# Patient Record
Sex: MALE | Race: WHITE | NOT HISPANIC OR LATINO
[De-identification: names, ages, dates, MRNs, and addresses within clinical notes are randomized per-mention and may not be internally consistent; named-entity substitution may affect disease eponyms.]

---

## 2019-02-13 ENCOUNTER — APPOINTMENT (OUTPATIENT)
Dept: ORTHOPEDIC SURGERY | Facility: CLINIC | Age: 84
End: 2019-02-13
Payer: MEDICARE

## 2019-02-13 VITALS
DIASTOLIC BLOOD PRESSURE: 86 MMHG | HEIGHT: 72 IN | BODY MASS INDEX: 26.41 KG/M2 | SYSTOLIC BLOOD PRESSURE: 132 MMHG | HEART RATE: 66 BPM | WEIGHT: 195 LBS

## 2019-02-13 DIAGNOSIS — Z47.1 AFTERCARE FOLLOWING JOINT REPLACEMENT SURGERY: ICD-10-CM

## 2019-02-13 DIAGNOSIS — Z96.642 PRESENCE OF LEFT ARTIFICIAL HIP JOINT: ICD-10-CM

## 2019-02-13 PROCEDURE — 73502 X-RAY EXAM HIP UNI 2-3 VIEWS: CPT | Mod: LT

## 2019-02-13 PROCEDURE — 99213 OFFICE O/P EST LOW 20 MIN: CPT

## 2019-02-13 NOTE — ADDENDUM
[FreeTextEntry1] : I, Andre Christiansen, acted solely as a scribe for Dr. Arik Rees on this date 02/13/2019. \par I, Moris Dangelo, acted solely as a scribe for Dr. Arik Rees on this date 02/13/2019 .

## 2019-02-13 NOTE — HISTORY OF PRESENT ILLNESS
[Stable] : stable [Direct Pressure] : worsened by direct pressure [Recumbency] : relieved by recumbency [Rest] : relieved by rest [Walking] : walking [Sitting] : sitting [Standing] : standing [Intermit.] : ~He/She~ states the symptoms seem to be intermittent [de-identified] : 83 year old male presents s/p left THR DOS 08/18/16 and bilateral TKA DOS 12/04/14. He is progressing well and his pain is well controlled. He is active and plays tennis about 3 days per week. He denies groin pain but notes lateral hip pain that has lasted for the last 6 months.

## 2019-02-13 NOTE — DISCUSSION/SUMMARY
[de-identified] : 83 year old male presents s/p left THR DOS 08/18/16 and bilateral TKA DOS 12/04/14, with left hip bursitis. Xrays were reviewed and the patient was reassured that their THR components are in good position with no signs of loosening or wear. He is progressing well and is happy with his surgical result. I offered the patient a cortisone injection in the left greater trochanteric bursa but he defers at this time. Patient will FU annually for radiographic surveillance. \par \par Total hip arthroplasty: A hip replacement means a resurfacing of both surfaces of the hip, with metal, ceramic and/or plastic parts. The prosthetic parts are usually press fit into bone, and only rarely cemented into position. Patients are allowed to weight bear as tolerated in most cases. The postoperative motion is determined by multiple factors the most important of which is preoperative motion. In general, the better the motion pre operatively, the better the motion post operatively. The operation, pending medical clearance, generally requires a hospitalization of 3-4 days. In general, we prefer to perform the procedure under epidural or general anesthesia. Preoperatively we institute a nomogram for blood management which may include the administration of procrit. The operative procedure takes approximately 1-2 hours. The operation requires an oblique incision anywhere from 4-6 inches over the posterolateral hip region. Post operatively the patient is walking the day of or the day after surgery. The first couple of days are very painful and the pain medication will alleviate but not eliminate the pain. Thus the patient must really push hard to get their mobility back. Our goal for having the person go home is that they can walk with a walker or a cane. The walking aide is to be dispensed with once the patient is secure enough. In general, there is no cast or braces required with a routine hip replacement. In the long term, we do not encourage our patients to run for the sake of running; although, pending their pre operative status, we often allow patients to play doubles tennis or comparable activities. We also allow them to do gentle intermediate downhill skiing if they are truly an expert skier. Biking is encouraged as well as swimming. The follow up periods are usually at 3 weeks, 6 weeks, 3 months, and yearly intervals. Potential complications with total hip replacements include anaesthetic complications and death, infection (less than 1%), nerve damage, and in the case of a sciatic nerve injury, a permanent foot drop, (a flapping foot with ambulation that requires bracing). There are always areas of skin numbness but this is not an untoward effect nor do we consider it a complication. Other potential complications include dislocation of the hip component (less than 5%). In cases of recurrent dislocation revision surgery may be required. The loosening of either the acetabular or femoral component is much more infrequent. The components may wear, creating particulate debris, loosening and systemic complications. Specific concerns exist with all bearing surfaces such as metal sensitivity with metal on metal components, and the risk of fracture and squeaking with ceramic components. Major blood vessel damage is also extremely rare. Theoretically because of the anatomic proximity of the femoral artery, it could be lacerated with subsequent repairs required. Albeit unlikely, a disruption of the femoral artery could theoretically result in an amputation. Similarly, infection could theoretically result in an amputation if one were to grow out an organism that cannot be controlled with antibiotics. Most infections require removal of the prosthesis, placement of an antibiotic spacer, IV antibiotics for eradication, prior to reimplantation. General medical complications include phlebitis for which we prophylactically anticoagulate but it could still occur and fatal pulmonary embolus has been reported. Cardiovascular problems, such as a heart attack or ischemia are always a concern with such hemodynamic changes in the blood vascular system. There is a risk of leg length discrepancy and the need for a shoe lift. Other general complications are very rare but anything in medicine could theoretically happen. I think the patient understands the risk benefit ratio of total hip replacement and will think about whether they would like to pursue an operative or non-operative option.

## 2019-02-13 NOTE — PHYSICAL EXAM
[LE] : Sensory: Intact in bilateral lower extremities [ALL] : dorsalis pedis, posterior tibial, femoral, popliteal, and radial 2+ and symmetric bilaterally [Antalgic] : not antalgic [Poor Appearance] : well-appearing [Acute Distress] : not in acute distress [Obese] : not obese [de-identified] : GENERAL APPEARANCE: Well nourished and hydrated, pleasant, alert, and oriented x 3. Appears their stated age. \par HEENT: Normocephalic, extraocular eye motion intact. Nasal septum midline. Oral cavity clear. External auditory canal clear. \par RESPIRATORY: Breath sounds clear and audible in all lobes. No wheezing, No accessory muscle use.\par CARDIOVASCULAR: No apparent abnormalities. No lower leg edema. No varicosities. Pedal pulses are palpable.\par NEUROLOGIC: Sensation is normal, no muscle weakness in the upper or lower extremities.\par DERMATOLOGIC: No apparent skin lesions, moist, warm, no rash.\par SPINE: Cervical spine appears normal and moves freely; thoracic spine appears normal and moves freely; lumbosacral spine appears normal and moves freely, normal, nontender.\par MUSCULOSKELETAL: Hands, wrists, and elbows are normal and move freely, shoulders are normal and move freely. \par \par \par   [de-identified] : Exam of left hip shows slight tenderness over the greater trochanteric bursa. FROM, no pain with motion. [de-identified] : 3V xray of the pelvis and left hip done in office today and reviewed by Dr. Arik Rees demonstrates s/p left THR with implants in good positioning, no sign of wear, loosening or subsidence.

## 2021-10-30 ENCOUNTER — TRANSCRIPTION ENCOUNTER (OUTPATIENT)
Age: 86
End: 2021-10-30

## 2022-11-25 ENCOUNTER — OFFICE (OUTPATIENT)
Dept: URBAN - METROPOLITAN AREA CLINIC 12 | Facility: CLINIC | Age: 87
Setting detail: OPHTHALMOLOGY
End: 2022-11-25
Payer: MEDICARE

## 2022-11-25 DIAGNOSIS — S05.11XS: ICD-10-CM

## 2022-11-25 DIAGNOSIS — H35.3113: ICD-10-CM

## 2022-11-25 DIAGNOSIS — H40.31X4: ICD-10-CM

## 2022-11-25 DIAGNOSIS — H35.3123: ICD-10-CM

## 2022-11-25 PROCEDURE — 99213 OFFICE O/P EST LOW 20 MIN: CPT | Performed by: OPHTHALMOLOGY

## 2022-11-25 PROCEDURE — 92134 CPTRZ OPH DX IMG PST SGM RTA: CPT | Performed by: OPHTHALMOLOGY

## 2022-11-25 PROCEDURE — 92083 EXTENDED VISUAL FIELD XM: CPT | Performed by: OPHTHALMOLOGY

## 2022-11-25 ASSESSMENT — REFRACTION_MANIFEST
OD_CYLINDER: -0.75
OD_SPHERE: +1.00
OD_ADD: +3.00
OS_ADD: +3.00
OD_AXIS: 110
OS_VA1: 20/50-
OD_VA1: 20/40-
OS_SPHERE: +0.50
OS_CYLINDER: -0.75
OS_AXIS: 080

## 2022-11-25 ASSESSMENT — PACHYMETRY
OD_CT_CORRECTION: 1
OS_CT_UM: 521
OD_CT_UM: 520
OS_CT_CORRECTION: 1

## 2022-11-25 ASSESSMENT — KERATOMETRY
OS_K2POWER_DIOPTERS: 42.00
OD_AXISANGLE_DEGREES: 090
OS_K1POWER_DIOPTERS: 41.50
OD_K2POWER_DIOPTERS: 41.25
OS_AXISANGLE_DEGREES: 157
OD_K1POWER_DIOPTERS: 41.25

## 2022-11-25 ASSESSMENT — SPHEQUIV_DERIVED
OD_SPHEQUIV: 0.625
OS_SPHEQUIV: 0.25
OS_SPHEQUIV: 0.125
OD_SPHEQUIV: 0.5

## 2022-11-25 ASSESSMENT — REFRACTION_AUTOREFRACTION
OS_AXIS: 085
OD_SPHERE: +1.00
OS_SPHERE: +0.75
OD_CYLINDER: -1.00
OD_AXIS: 108
OS_CYLINDER: -1.00

## 2022-11-25 ASSESSMENT — VISUAL ACUITY
OD_BCVA: 20/60
OS_BCVA: 20/25-2

## 2022-11-25 ASSESSMENT — TONOMETRY
OS_IOP_MMHG: 13
OD_IOP_MMHG: 13

## 2022-11-25 ASSESSMENT — CONFRONTATIONAL VISUAL FIELD TEST (CVF)
OD_FINDINGS: FULL
OS_FINDINGS: FULL

## 2022-11-25 ASSESSMENT — AXIALLENGTH_DERIVED
OD_AL: 24.1896
OS_AL: 24.1502
OD_AL: 24.2409
OS_AL: 24.2013

## 2023-03-27 ENCOUNTER — OFFICE (OUTPATIENT)
Dept: URBAN - METROPOLITAN AREA CLINIC 88 | Facility: CLINIC | Age: 88
Setting detail: OPHTHALMOLOGY
End: 2023-03-27
Payer: MEDICARE

## 2023-03-27 DIAGNOSIS — H35.3112: ICD-10-CM

## 2023-03-27 DIAGNOSIS — H35.3123: ICD-10-CM

## 2023-03-27 DIAGNOSIS — S05.11XS: ICD-10-CM

## 2023-03-27 DIAGNOSIS — H43.813: ICD-10-CM

## 2023-03-27 PROCEDURE — 92134 CPTRZ OPH DX IMG PST SGM RTA: CPT | Performed by: SPECIALIST

## 2023-03-27 PROCEDURE — 92014 COMPRE OPH EXAM EST PT 1/>: CPT | Performed by: SPECIALIST

## 2023-03-27 PROCEDURE — 67028 INJECTION EYE DRUG: CPT | Performed by: SPECIALIST

## 2023-03-27 PROCEDURE — 92235 FLUORESCEIN ANGRPH MLTIFRAME: CPT | Performed by: SPECIALIST

## 2023-03-27 ASSESSMENT — REFRACTION_AUTOREFRACTION
OD_SPHERE: +1.00
OS_CYLINDER: -1.00
OD_AXIS: 108
OD_CYLINDER: -1.00
OS_AXIS: 085
OS_SPHERE: +0.75

## 2023-03-27 ASSESSMENT — KERATOMETRY
OD_K2POWER_DIOPTERS: 41.25
OS_AXISANGLE_DEGREES: 157
OD_K1POWER_DIOPTERS: 41.25
OS_K2POWER_DIOPTERS: 42.00
OD_AXISANGLE_DEGREES: 090
OS_K1POWER_DIOPTERS: 41.50

## 2023-03-27 ASSESSMENT — AXIALLENGTH_DERIVED
OD_AL: 24.2409
OS_AL: 24.1502

## 2023-03-27 ASSESSMENT — VISUAL ACUITY
OD_BCVA: 20/80
OS_BCVA: 20/40+2

## 2023-03-27 ASSESSMENT — SPHEQUIV_DERIVED
OD_SPHEQUIV: 0.5
OS_SPHEQUIV: 0.25

## 2023-03-27 ASSESSMENT — CONFRONTATIONAL VISUAL FIELD TEST (CVF)
OS_FINDINGS: FULL
OD_FINDINGS: FULL

## 2023-07-21 ENCOUNTER — OFFICE (OUTPATIENT)
Dept: URBAN - METROPOLITAN AREA CLINIC 88 | Facility: CLINIC | Age: 88
Setting detail: OPHTHALMOLOGY
End: 2023-07-21
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
## 2023-07-21 PROCEDURE — NO SHOW FE NO SHOW FEE: Performed by: SPECIALIST

## 2023-08-10 ENCOUNTER — APPOINTMENT (OUTPATIENT)
Dept: ORTHOPEDIC SURGERY | Facility: CLINIC | Age: 88
End: 2023-08-10
Payer: MEDICARE

## 2023-08-10 VITALS — WEIGHT: 195 LBS | HEIGHT: 72 IN | BODY MASS INDEX: 26.41 KG/M2

## 2023-08-10 DIAGNOSIS — M19.011 PRIMARY OSTEOARTHRITIS, RIGHT SHOULDER: ICD-10-CM

## 2023-08-10 PROCEDURE — 99204 OFFICE O/P NEW MOD 45 MIN: CPT

## 2023-08-10 PROCEDURE — 73030 X-RAY EXAM OF SHOULDER: CPT | Mod: RT

## 2023-08-10 NOTE — PHYSICAL EXAM
[de-identified] : General: Well appearing, no acute distress Neurologic: A&Ox3, No focal deficits Head: NCAT without abrasions, lacerations, or ecchymosis to head, face, or scalp Eyes: No scleral icterus, no gross abnormalities Respiratory: Equal chest wall expansion bilaterally, no accessory muscle use Lymphatic: No lymphadenopathy palpated Skin: Warm and dry Psychiatric: Normal mood and affect  Examination of the Right shoulder shows no obvious deformity, swelling or erythema. Mild tenderness to palpation over the anterior shoulder. No AC joint tenderness. The patient demonstrates minimal active/passive ROM secondary to degenerative changes. The patient has a positive Justice and Neers test. No pain with cross body adduction, lift off testing, AC compression testing or Yergason testing. The patient has 4/5 strength to forward flexion with pronation, internal and external rotation. Compartments are soft and nontender. The patient has 2+ cap refill and sensation is intact in the hand.   [de-identified] : 4 views of right shoulder were performed today and available for me to review. Results were discussed with the patient. They demonstrate a high riding humerus, degenerative changes consistent with osteoarthritis, and arthropathy.    MRI [facility]   DATE OF EXAM: 07/11/2023  MRI-RIGHT SHOULDER NON CONTRAST  IMPRESSION:  Motion-degraded study.  Osteoarthritis, mild at the AC joint and severe at the glenohumeral joint.  Accompanying advanced labrum degeneration, with degenerative tearing of the posterior labrum.  Joint effusions as noted.  Moderate-to-severe rotator cuff tendinosis, with intrasubstance and partial-thickness tearing as detailed above.  Severe long head biceps tendinosis and tenosynovitis, with the tenosynovial osteocartilaginous body. See above.

## 2023-08-10 NOTE — DISCUSSION/SUMMARY
[de-identified] : We had a thorough discussion regarding the nature of his pain, the pathophysiology, as well as all treatment options. Based on clinical exam, MRI and radiograph findings he has arthropathy of the R shoulder. I discussed operative and non-operative treatment modalities. All risks, benefits and alternatives to the proposed surgical procedure, R reverse shoulder arthroplasty, were discussed in great detail with the patient. Risks include but are not limited to pain, bleeding, infection, stiffness, medical complications (including DVT, PE, MI), and risks of anesthesia. The patient expressed understanding and all questions were answered. The patient is electing to proceed, and I will have the patient scheduled accordingly. I provided him contact number of my surgical coordinator Nasim, who will go over dates for this procedure. All questions were answered.

## 2023-08-10 NOTE — ADDENDUM
[FreeTextEntry1] : Documented by Candi Stephen acting as a scribe for Dr. Macias on 08/10/2023. All medical record entries made by the Scribe were at my, Dr. Macias's, direction and personally dictated by me on 08/10/2023. I have reviewed the chart and agree that the record accurately reflects my personal performance of the history, physical exam, procedure and imaging.

## 2023-09-14 ENCOUNTER — RESULT REVIEW (OUTPATIENT)
Age: 88
End: 2023-09-14

## 2023-09-14 ENCOUNTER — OUTPATIENT (OUTPATIENT)
Dept: OUTPATIENT SERVICES | Facility: HOSPITAL | Age: 88
LOS: 1 days | End: 2023-09-14
Payer: MEDICARE

## 2023-09-14 VITALS
HEART RATE: 91 BPM | DIASTOLIC BLOOD PRESSURE: 74 MMHG | TEMPERATURE: 98 F | SYSTOLIC BLOOD PRESSURE: 140 MMHG | HEIGHT: 69 IN | OXYGEN SATURATION: 100 % | RESPIRATION RATE: 18 BRPM | WEIGHT: 197.98 LBS

## 2023-09-14 DIAGNOSIS — Z96.642 PRESENCE OF LEFT ARTIFICIAL HIP JOINT: Chronic | ICD-10-CM

## 2023-09-14 DIAGNOSIS — M19.011 PRIMARY OSTEOARTHRITIS, RIGHT SHOULDER: ICD-10-CM

## 2023-09-14 DIAGNOSIS — Z96.653 PRESENCE OF ARTIFICIAL KNEE JOINT, BILATERAL: Chronic | ICD-10-CM

## 2023-09-14 DIAGNOSIS — Z01.818 ENCOUNTER FOR OTHER PREPROCEDURAL EXAMINATION: ICD-10-CM

## 2023-09-14 DIAGNOSIS — Z90.89 ACQUIRED ABSENCE OF OTHER ORGANS: Chronic | ICD-10-CM

## 2023-09-14 DIAGNOSIS — Z98.890 OTHER SPECIFIED POSTPROCEDURAL STATES: Chronic | ICD-10-CM

## 2023-09-14 LAB
A1C WITH ESTIMATED AVERAGE GLUCOSE RESULT: 5.5 % — SIGNIFICANT CHANGE UP (ref 4–5.6)
ALBUMIN SERPL ELPH-MCNC: 3.3 G/DL — SIGNIFICANT CHANGE UP (ref 3.3–5)
ANION GAP SERPL CALC-SCNC: 4 MMOL/L — LOW (ref 5–17)
APPEARANCE UR: ABNORMAL
APTT BLD: 37.4 SEC — HIGH (ref 24.5–35.6)
BACTERIA # UR AUTO: ABNORMAL
BASOPHILS # BLD AUTO: 0.04 K/UL — SIGNIFICANT CHANGE UP (ref 0–0.2)
BASOPHILS NFR BLD AUTO: 0.4 % — SIGNIFICANT CHANGE UP (ref 0–2)
BILIRUB UR-MCNC: NEGATIVE — SIGNIFICANT CHANGE UP
BLD GP AB SCN SERPL QL: SIGNIFICANT CHANGE UP
BUN SERPL-MCNC: 12 MG/DL — SIGNIFICANT CHANGE UP (ref 7–23)
CALCIUM SERPL-MCNC: 9.4 MG/DL — SIGNIFICANT CHANGE UP (ref 8.5–10.1)
CHLORIDE SERPL-SCNC: 106 MMOL/L — SIGNIFICANT CHANGE UP (ref 96–108)
CO2 SERPL-SCNC: 28 MMOL/L — SIGNIFICANT CHANGE UP (ref 22–31)
COLOR SPEC: YELLOW — SIGNIFICANT CHANGE UP
CREAT SERPL-MCNC: 0.86 MG/DL — SIGNIFICANT CHANGE UP (ref 0.5–1.3)
DIFF PNL FLD: ABNORMAL
EGFR: 84 ML/MIN/1.73M2 — SIGNIFICANT CHANGE UP
EOSINOPHIL # BLD AUTO: 0.18 K/UL — SIGNIFICANT CHANGE UP (ref 0–0.5)
EOSINOPHIL NFR BLD AUTO: 1.9 % — SIGNIFICANT CHANGE UP (ref 0–6)
EPI CELLS # UR: NEGATIVE — SIGNIFICANT CHANGE UP
ESTIMATED AVERAGE GLUCOSE: 111 MG/DL — SIGNIFICANT CHANGE UP (ref 68–114)
GLUCOSE SERPL-MCNC: 98 MG/DL — SIGNIFICANT CHANGE UP (ref 70–99)
GLUCOSE UR QL: NEGATIVE — SIGNIFICANT CHANGE UP
HCT VFR BLD CALC: 37.7 % — LOW (ref 39–50)
HGB BLD-MCNC: 12.6 G/DL — LOW (ref 13–17)
IMM GRANULOCYTES NFR BLD AUTO: 0.5 % — SIGNIFICANT CHANGE UP (ref 0–0.9)
INR BLD: 0.94 RATIO — SIGNIFICANT CHANGE UP (ref 0.85–1.18)
KETONES UR-MCNC: NEGATIVE — SIGNIFICANT CHANGE UP
LEUKOCYTE ESTERASE UR-ACNC: ABNORMAL
LYMPHOCYTES # BLD AUTO: 1.52 K/UL — SIGNIFICANT CHANGE UP (ref 1–3.3)
LYMPHOCYTES # BLD AUTO: 16.3 % — SIGNIFICANT CHANGE UP (ref 13–44)
MCHC RBC-ENTMCNC: 30.4 PG — SIGNIFICANT CHANGE UP (ref 27–34)
MCHC RBC-ENTMCNC: 33.4 GM/DL — SIGNIFICANT CHANGE UP (ref 32–36)
MCV RBC AUTO: 91.1 FL — SIGNIFICANT CHANGE UP (ref 80–100)
MONOCYTES # BLD AUTO: 0.6 K/UL — SIGNIFICANT CHANGE UP (ref 0–0.9)
MONOCYTES NFR BLD AUTO: 6.4 % — SIGNIFICANT CHANGE UP (ref 2–14)
NEUTROPHILS # BLD AUTO: 6.93 K/UL — SIGNIFICANT CHANGE UP (ref 1.8–7.4)
NEUTROPHILS NFR BLD AUTO: 74.5 % — SIGNIFICANT CHANGE UP (ref 43–77)
NITRITE UR-MCNC: POSITIVE
PH UR: 5 — SIGNIFICANT CHANGE UP (ref 5–8)
PLATELET # BLD AUTO: 315 K/UL — SIGNIFICANT CHANGE UP (ref 150–400)
POTASSIUM SERPL-MCNC: 4.5 MMOL/L — SIGNIFICANT CHANGE UP (ref 3.5–5.3)
POTASSIUM SERPL-SCNC: 4.5 MMOL/L — SIGNIFICANT CHANGE UP (ref 3.5–5.3)
PROT UR-MCNC: SIGNIFICANT CHANGE UP MG/DL
PROTHROM AB SERPL-ACNC: 10.6 SEC — SIGNIFICANT CHANGE UP (ref 9.5–13)
RBC # BLD: 4.14 M/UL — LOW (ref 4.2–5.8)
RBC # FLD: 14.8 % — HIGH (ref 10.3–14.5)
RBC CASTS # UR COMP ASSIST: >50 /HPF (ref 0–4)
SODIUM SERPL-SCNC: 138 MMOL/L — SIGNIFICANT CHANGE UP (ref 135–145)
SP GR SPEC: 1.02 — SIGNIFICANT CHANGE UP (ref 1.01–1.02)
UROBILINOGEN FLD QL: NEGATIVE — SIGNIFICANT CHANGE UP
WBC # BLD: 9.32 K/UL — SIGNIFICANT CHANGE UP (ref 3.8–10.5)
WBC # FLD AUTO: 9.32 K/UL — SIGNIFICANT CHANGE UP (ref 3.8–10.5)
WBC UR QL: ABNORMAL /HPF (ref 0–5)

## 2023-09-14 PROCEDURE — 85025 COMPLETE CBC W/AUTO DIFF WBC: CPT

## 2023-09-14 PROCEDURE — 81001 URINALYSIS AUTO W/SCOPE: CPT

## 2023-09-14 PROCEDURE — 83036 HEMOGLOBIN GLYCOSYLATED A1C: CPT

## 2023-09-14 PROCEDURE — 82040 ASSAY OF SERUM ALBUMIN: CPT

## 2023-09-14 PROCEDURE — 36415 COLL VENOUS BLD VENIPUNCTURE: CPT

## 2023-09-14 PROCEDURE — 86901 BLOOD TYPING SEROLOGIC RH(D): CPT

## 2023-09-14 PROCEDURE — 71046 X-RAY EXAM CHEST 2 VIEWS: CPT | Mod: 26

## 2023-09-14 PROCEDURE — 71046 X-RAY EXAM CHEST 2 VIEWS: CPT

## 2023-09-14 PROCEDURE — 85730 THROMBOPLASTIN TIME PARTIAL: CPT

## 2023-09-14 PROCEDURE — 93005 ELECTROCARDIOGRAM TRACING: CPT

## 2023-09-14 PROCEDURE — 86850 RBC ANTIBODY SCREEN: CPT

## 2023-09-14 PROCEDURE — 87641 MR-STAPH DNA AMP PROBE: CPT

## 2023-09-14 PROCEDURE — 85610 PROTHROMBIN TIME: CPT

## 2023-09-14 PROCEDURE — 80048 BASIC METABOLIC PNL TOTAL CA: CPT

## 2023-09-14 PROCEDURE — 86900 BLOOD TYPING SEROLOGIC ABO: CPT

## 2023-09-14 PROCEDURE — 87640 STAPH A DNA AMP PROBE: CPT

## 2023-09-14 PROCEDURE — 99214 OFFICE O/P EST MOD 30 MIN: CPT | Mod: 25

## 2023-09-14 PROCEDURE — 93010 ELECTROCARDIOGRAM REPORT: CPT

## 2023-09-14 NOTE — H&P PST ADULT - NSICDXPROCEDURE_GEN_ALL_CORE_FT
Recommended OBSERVATION and MONITORING for change. PROCEDURES:  Reverse total shoulder replacement 14-Sep-2023 15:59:19  Mikaela Ramos

## 2023-09-14 NOTE — H&P PST ADULT - ASSESSMENT
86 y/o male with right shoulder OA, PMH of HTN, Gout. Pt reports right shoulder pain for 2 yrs now, he takes NSAID with mild pain relief. He is scheduled for Right reverse shoulder replacement.   Plan  1. Stop all NSAIDS, herbal supplements and vitamins for 7 days.  2. NPO as per ASU instructions  3. Take the following medications ( Amlodipine ) with small sips of water on the morning of your procedure/surgery.  4. Use EZ sponges as directed  5. Use mupirocin as directed  6. Labs, EKG, CXR as per surgeon.  7. PMD visit for optimization prior to surgery as per surgeon.    CAPRINI SCORE [CLOT]    AGE RELATED RISK FACTORS                                                       MOBILITY RELATED FACTORS  [ ] Age 41-60 years                                            (1 Point)                  [ ] Bed rest                                                        (1 Point)  [ ] Age: 61-74 years                                           (2 Points)                 [ ] Plaster cast                                                   (2 Points)  [x ] Age= 75 years                                              (3 Points)                 [ ] Bed bound for more than 72 hours                 (2 Points)    DISEASE RELATED RISK FACTORS                                               GENDER SPECIFIC FACTORS  [ ] Edema in the lower extremities                       (1 Point)                  [ ] Pregnancy                                                     (1 Point)  [ ] Varicose veins                                               (1 Point)                  [ ] Post-partum < 6 weeks                                   (1 Point)             [x ] BMI > 25 Kg/m2                                            (1 Point)                  [ ] Hormonal therapy  or oral contraception          (1 Point)                 [ ] Sepsis (in the previous month)                        (1 Point)                  [ ] History of pregnancy complications                 (1 point)  [ ] Pneumonia or serious lung disease                                               [ ] Unexplained or recurrent                     (1 Point)           (in the previous month)                               (1 Point)  [ ] Abnormal pulmonary function test                     (1 Point)                 SURGERY RELATED RISK FACTORS  [ ] Acute myocardial infarction                              (1 Point)                 [ ]  Section                                             (1 Point)  [ ] Congestive heart failure (in the previous month)  (1 Point)               [ ] Minor surgery                                                  (1 Point)   [ ] Inflammatory bowel disease                             (1 Point)                 [ ] Arthroscopic surgery                                        (2 Points)  [ ] Central venous access                                      (2 Points)                [ ] General surgery lasting more than 45 minutes   (2 Points)       [ ] Stroke (in the previous month)                          (5 Points)               [ x ] Elective arthroplasty                                         (5 Points)     ()  malignancy                                                             (2 points )                                                                                                                                      HEMATOLOGY RELATED FACTORS                                                 TRAUMA RELATED RISK FACTORS  [ ] Prior episodes of VTE                                     (3 Points)                 [ ] Fracture of the hip, pelvis, or leg                       (5 Points)  [ ] Positive family history for VTE                         (3 Points)                 [ ] Acute spinal cord injury (in the previous month)  (5 Points)  [ ] Prothrombin 23960 A                                     (3 Points)                 [ ] Paralysis  (less than 1 month)                             (5 Points)  [ ] Factor V Leiden                                             (3 Points)                  [ ] Multiple Trauma within 1 month                        (5 Points)  [ ] Lupus anticoagulants                                     (3 Points)                                                           [ ] Anticardiolipin antibodies                               (3 Points)                                                       [ ] High homocysteine in the blood                      (3 Points)                                             [ ] Other congenital or acquired thrombophilia      (3 Points)                                                [ ] Heparin induced thrombocytopenia                  (3 Points)    (  ) Malignancy                                        Total Score [       9   ]  The Caprini score indicates that this patient is at high risk for a VTE event (score => 6).    Ssurgical patients in this group will benefit from both pharmacologic prophylaxis and intermittent compression devices.    The surgical team will determine the balance between VTE risk and bleeding risk, and other clinical considerations.

## 2023-09-14 NOTE — H&P PST ADULT - NSICDXPASTMEDICALHX_GEN_ALL_CORE_FT
PAST MEDICAL HISTORY:  Gout     Hematuria     HTN (hypertension)     Kidney stone     Nephrolithiasis     Osteoarthritis     Osteoarthritis of right shoulder     Skin cancer

## 2023-09-14 NOTE — H&P PST ADULT - NSANTHOBSERVEDRD_ENT_A_CORE
pt not willing to receive education at this time. also, pt with poor PO intake, education not warranted at this time. will monitor for increase in PO intake/pt willingness for education and will provide PRN./none No

## 2023-09-14 NOTE — H&P PST ADULT - NSICDXPASTSURGICALHX_GEN_ALL_CORE_FT
PAST SURGICAL HISTORY:  H/O lithotripsy     H/O right inguinal hernia repair     History of removal of retained hardware     History of total bilateral knee replacement     S/P hip replacement, left     S/P ORIF (open reduction internal fixation) fracture RLE/ jaw    S/P tonsillectomy

## 2023-09-14 NOTE — H&P PST ADULT - HISTORY OF PRESENT ILLNESS
80M with osteoarthritis, for left hip replacement. 86 y/o male with right shoulder OA, PMH of HTN, Gout. Pt reports right shoulder pain for 2 yrs now, he takes NSAID with mild pain relief. He is here for PST for planned Right reverse shoulder replacement.

## 2023-09-14 NOTE — H&P PST ADULT - NSANTHOSAYNRD_GEN_A_CORE
No. GILMA screening performed.  STOP BANG Legend: 0-2 = LOW Risk; 3-4 = INTERMEDIATE Risk; 5-8 = HIGH Risk

## 2023-09-15 DIAGNOSIS — Z01.818 ENCOUNTER FOR OTHER PREPROCEDURAL EXAMINATION: ICD-10-CM

## 2023-09-15 DIAGNOSIS — M19.011 PRIMARY OSTEOARTHRITIS, RIGHT SHOULDER: ICD-10-CM

## 2023-09-15 LAB
MRSA PCR RESULT.: SIGNIFICANT CHANGE UP
S AUREUS DNA NOSE QL NAA+PROBE: DETECTED

## 2023-09-15 NOTE — CHART NOTE - NSCHARTNOTEFT_GEN_A_CORE
MSSA detected from nasal swab done in PST on 9/14/2023. Patient instructed to apply Mupirocin to nostrils 2 times per day for 5 days starting 9/15/2023. Patient was able to verbalize instructions. Questions answered.

## 2023-09-18 ENCOUNTER — APPOINTMENT (OUTPATIENT)
Dept: UROLOGY | Facility: CLINIC | Age: 88
End: 2023-09-18

## 2023-09-20 ENCOUNTER — RESULT REVIEW (OUTPATIENT)
Age: 88
End: 2023-09-20

## 2023-09-20 ENCOUNTER — APPOINTMENT (OUTPATIENT)
Dept: ORTHOPEDIC SURGERY | Facility: HOSPITAL | Age: 88
End: 2023-09-20

## 2023-09-20 ENCOUNTER — OUTPATIENT (OUTPATIENT)
Dept: INPATIENT UNIT | Facility: HOSPITAL | Age: 88
LOS: 1 days | Discharge: NURSING, REHAB OR LONG-TERM CARE | End: 2023-09-20
Payer: MEDICARE

## 2023-09-20 ENCOUNTER — TRANSCRIPTION ENCOUNTER (OUTPATIENT)
Age: 88
End: 2023-09-20

## 2023-09-20 VITALS
RESPIRATION RATE: 27 BRPM | HEART RATE: 77 BPM | SYSTOLIC BLOOD PRESSURE: 134 MMHG | TEMPERATURE: 97 F | OXYGEN SATURATION: 100 % | DIASTOLIC BLOOD PRESSURE: 93 MMHG | WEIGHT: 197.98 LBS | HEIGHT: 69 IN

## 2023-09-20 DIAGNOSIS — Z98.890 OTHER SPECIFIED POSTPROCEDURAL STATES: Chronic | ICD-10-CM

## 2023-09-20 DIAGNOSIS — M19.011 PRIMARY OSTEOARTHRITIS, RIGHT SHOULDER: ICD-10-CM

## 2023-09-20 DIAGNOSIS — Z90.89 ACQUIRED ABSENCE OF OTHER ORGANS: Chronic | ICD-10-CM

## 2023-09-20 DIAGNOSIS — Z96.642 PRESENCE OF LEFT ARTIFICIAL HIP JOINT: Chronic | ICD-10-CM

## 2023-09-20 DIAGNOSIS — Z96.653 PRESENCE OF ARTIFICIAL KNEE JOINT, BILATERAL: Chronic | ICD-10-CM

## 2023-09-20 LAB
ANION GAP SERPL CALC-SCNC: 6 MMOL/L — SIGNIFICANT CHANGE UP (ref 5–17)
BUN SERPL-MCNC: 17 MG/DL — SIGNIFICANT CHANGE UP (ref 7–23)
CALCIUM SERPL-MCNC: 8.8 MG/DL — SIGNIFICANT CHANGE UP (ref 8.5–10.1)
CHLORIDE SERPL-SCNC: 107 MMOL/L — SIGNIFICANT CHANGE UP (ref 96–108)
CO2 SERPL-SCNC: 26 MMOL/L — SIGNIFICANT CHANGE UP (ref 22–31)
CREAT SERPL-MCNC: 0.99 MG/DL — SIGNIFICANT CHANGE UP (ref 0.5–1.3)
EGFR: 74 ML/MIN/1.73M2 — SIGNIFICANT CHANGE UP
GLUCOSE BLDC GLUCOMTR-MCNC: 113 MG/DL — HIGH (ref 70–99)
GLUCOSE BLDC GLUCOMTR-MCNC: 189 MG/DL — HIGH (ref 70–99)
GLUCOSE BLDC GLUCOMTR-MCNC: 97 MG/DL — SIGNIFICANT CHANGE UP (ref 70–99)
GLUCOSE SERPL-MCNC: 134 MG/DL — HIGH (ref 70–99)
HCT VFR BLD CALC: 35.2 % — LOW (ref 39–50)
HGB BLD-MCNC: 11.6 G/DL — LOW (ref 13–17)
MCHC RBC-ENTMCNC: 30.7 PG — SIGNIFICANT CHANGE UP (ref 27–34)
MCHC RBC-ENTMCNC: 33 GM/DL — SIGNIFICANT CHANGE UP (ref 32–36)
MCV RBC AUTO: 93.1 FL — SIGNIFICANT CHANGE UP (ref 80–100)
PLATELET # BLD AUTO: 292 K/UL — SIGNIFICANT CHANGE UP (ref 150–400)
POTASSIUM SERPL-MCNC: 3.9 MMOL/L — SIGNIFICANT CHANGE UP (ref 3.5–5.3)
POTASSIUM SERPL-SCNC: 3.9 MMOL/L — SIGNIFICANT CHANGE UP (ref 3.5–5.3)
RBC # BLD: 3.78 M/UL — LOW (ref 4.2–5.8)
RBC # FLD: 14.8 % — HIGH (ref 10.3–14.5)
SODIUM SERPL-SCNC: 139 MMOL/L — SIGNIFICANT CHANGE UP (ref 135–145)
WBC # BLD: 10.63 K/UL — HIGH (ref 3.8–10.5)
WBC # FLD AUTO: 10.63 K/UL — HIGH (ref 3.8–10.5)

## 2023-09-20 PROCEDURE — 99024 POSTOP FOLLOW-UP VISIT: CPT

## 2023-09-20 PROCEDURE — 99205 OFFICE O/P NEW HI 60 MIN: CPT

## 2023-09-20 PROCEDURE — 23472 RECONSTRUCT SHOULDER JOINT: CPT | Mod: RT

## 2023-09-20 PROCEDURE — 73030 X-RAY EXAM OF SHOULDER: CPT | Mod: 26,RT

## 2023-09-20 PROCEDURE — 88305 TISSUE EXAM BY PATHOLOGIST: CPT | Mod: 26

## 2023-09-20 RX ORDER — CEFAZOLIN SODIUM 1 G
2000 VIAL (EA) INJECTION EVERY 8 HOURS
Refills: 0 | Status: COMPLETED | OUTPATIENT
Start: 2023-09-20 | End: 2023-09-21

## 2023-09-20 RX ORDER — INFLUENZA VIRUS VACCINE 15; 15; 15; 15 UG/.5ML; UG/.5ML; UG/.5ML; UG/.5ML
0.7 SUSPENSION INTRAMUSCULAR ONCE
Refills: 0 | Status: COMPLETED | OUTPATIENT
Start: 2023-09-20 | End: 2023-09-20

## 2023-09-20 RX ORDER — ACETAMINOPHEN 500 MG
1000 TABLET ORAL EVERY 8 HOURS
Refills: 0 | Status: DISCONTINUED | OUTPATIENT
Start: 2023-09-20 | End: 2023-09-23

## 2023-09-20 RX ORDER — PANTOPRAZOLE SODIUM 20 MG/1
40 TABLET, DELAYED RELEASE ORAL ONCE
Refills: 0 | Status: COMPLETED | OUTPATIENT
Start: 2023-09-20 | End: 2023-09-20

## 2023-09-20 RX ORDER — OXYCODONE HYDROCHLORIDE 5 MG/1
1 TABLET ORAL
Qty: 30 | Refills: 0
Start: 2023-09-20 | End: 2023-09-24

## 2023-09-20 RX ORDER — PROCHLORPERAZINE MALEATE 5 MG
10 TABLET ORAL EVERY 8 HOURS
Refills: 0 | Status: DISCONTINUED | OUTPATIENT
Start: 2023-09-20 | End: 2023-09-23

## 2023-09-20 RX ORDER — TRANEXAMIC ACID 100 MG/ML
1000 INJECTION, SOLUTION INTRAVENOUS ONCE
Refills: 0 | Status: DISCONTINUED | OUTPATIENT
Start: 2023-09-20 | End: 2023-09-23

## 2023-09-20 RX ORDER — HYDROMORPHONE HYDROCHLORIDE 2 MG/ML
0.5 INJECTION INTRAMUSCULAR; INTRAVENOUS; SUBCUTANEOUS
Refills: 0 | Status: DISCONTINUED | OUTPATIENT
Start: 2023-09-20 | End: 2023-09-20

## 2023-09-20 RX ORDER — ONDANSETRON 8 MG/1
4 TABLET, FILM COATED ORAL ONCE
Refills: 0 | Status: DISCONTINUED | OUTPATIENT
Start: 2023-09-20 | End: 2023-09-20

## 2023-09-20 RX ORDER — ONDANSETRON 8 MG/1
1 TABLET, FILM COATED ORAL
Qty: 15 | Refills: 0
Start: 2023-09-20 | End: 2023-09-24

## 2023-09-20 RX ORDER — ONDANSETRON 8 MG/1
8 TABLET, FILM COATED ORAL EVERY 8 HOURS
Refills: 0 | Status: DISCONTINUED | OUTPATIENT
Start: 2023-09-20 | End: 2023-09-23

## 2023-09-20 RX ORDER — SODIUM CHLORIDE 9 MG/ML
1000 INJECTION, SOLUTION INTRAVENOUS
Refills: 0 | Status: DISCONTINUED | OUTPATIENT
Start: 2023-09-20 | End: 2023-09-20

## 2023-09-20 RX ORDER — ACETAMINOPHEN 500 MG
2 TABLET ORAL
Qty: 84 | Refills: 0
Start: 2023-09-20 | End: 2023-10-03

## 2023-09-20 RX ORDER — OXYCODONE HYDROCHLORIDE 5 MG/1
5 TABLET ORAL EVERY 4 HOURS
Refills: 0 | Status: DISCONTINUED | OUTPATIENT
Start: 2023-09-20 | End: 2023-09-23

## 2023-09-20 RX ORDER — ALLOPURINOL 300 MG
300 TABLET ORAL DAILY
Refills: 0 | Status: DISCONTINUED | OUTPATIENT
Start: 2023-09-20 | End: 2023-09-23

## 2023-09-20 RX ORDER — ASPIRIN/CALCIUM CARB/MAGNESIUM 324 MG
325 TABLET ORAL DAILY
Refills: 0 | Status: DISCONTINUED | OUTPATIENT
Start: 2023-09-21 | End: 2023-09-23

## 2023-09-20 RX ORDER — SENNA PLUS 8.6 MG/1
2 TABLET ORAL AT BEDTIME
Refills: 0 | Status: DISCONTINUED | OUTPATIENT
Start: 2023-09-20 | End: 2023-09-23

## 2023-09-20 RX ORDER — HYDROMORPHONE HYDROCHLORIDE 2 MG/ML
0.5 INJECTION INTRAMUSCULAR; INTRAVENOUS; SUBCUTANEOUS EVERY 4 HOURS
Refills: 0 | Status: DISCONTINUED | OUTPATIENT
Start: 2023-09-20 | End: 2023-09-23

## 2023-09-20 RX ORDER — PANTOPRAZOLE SODIUM 20 MG/1
1 TABLET, DELAYED RELEASE ORAL
Qty: 30 | Refills: 0
Start: 2023-09-20 | End: 2023-10-19

## 2023-09-20 RX ORDER — POLYETHYLENE GLYCOL 3350 17 G/17G
17 POWDER, FOR SOLUTION ORAL
Qty: 1 | Refills: 0
Start: 2023-09-20 | End: 2023-10-03

## 2023-09-20 RX ORDER — OXYCODONE HYDROCHLORIDE 5 MG/1
10 TABLET ORAL ONCE
Refills: 0 | Status: DISCONTINUED | OUTPATIENT
Start: 2023-09-20 | End: 2023-09-20

## 2023-09-20 RX ORDER — ASPIRIN/CALCIUM CARB/MAGNESIUM 324 MG
1 TABLET ORAL
Qty: 14 | Refills: 0
Start: 2023-09-20 | End: 2023-10-03

## 2023-09-20 RX ORDER — POLYETHYLENE GLYCOL 3350 17 G/17G
17 POWDER, FOR SOLUTION ORAL AT BEDTIME
Refills: 0 | Status: DISCONTINUED | OUTPATIENT
Start: 2023-09-20 | End: 2023-09-23

## 2023-09-20 RX ORDER — DEXAMETHASONE 0.5 MG/5ML
8 ELIXIR ORAL ONCE
Refills: 0 | Status: COMPLETED | OUTPATIENT
Start: 2023-09-21 | End: 2023-09-21

## 2023-09-20 RX ORDER — OXYCODONE HYDROCHLORIDE 5 MG/1
10 TABLET ORAL EVERY 4 HOURS
Refills: 0 | Status: DISCONTINUED | OUTPATIENT
Start: 2023-09-20 | End: 2023-09-23

## 2023-09-20 RX ORDER — SODIUM CHLORIDE 9 MG/ML
1000 INJECTION, SOLUTION INTRAVENOUS
Refills: 0 | Status: DISCONTINUED | OUTPATIENT
Start: 2023-09-20 | End: 2023-09-23

## 2023-09-20 RX ORDER — AMLODIPINE BESYLATE 2.5 MG/1
2.5 TABLET ORAL DAILY
Refills: 0 | Status: DISCONTINUED | OUTPATIENT
Start: 2023-09-20 | End: 2023-09-23

## 2023-09-20 RX ORDER — SENNA PLUS 8.6 MG/1
2 TABLET ORAL
Qty: 28 | Refills: 0
Start: 2023-09-20 | End: 2023-10-03

## 2023-09-20 RX ORDER — PANTOPRAZOLE SODIUM 20 MG/1
40 TABLET, DELAYED RELEASE ORAL
Refills: 0 | Status: DISCONTINUED | OUTPATIENT
Start: 2023-09-20 | End: 2023-09-23

## 2023-09-20 RX ADMIN — Medication 100 MILLIGRAM(S): at 21:47

## 2023-09-20 RX ADMIN — Medication 300 MILLIGRAM(S): at 17:14

## 2023-09-20 RX ADMIN — AMLODIPINE BESYLATE 2.5 MILLIGRAM(S): 2.5 TABLET ORAL at 17:14

## 2023-09-20 RX ADMIN — PANTOPRAZOLE SODIUM 40 MILLIGRAM(S): 20 TABLET, DELAYED RELEASE ORAL at 17:14

## 2023-09-20 RX ADMIN — Medication 1000 MILLIGRAM(S): at 21:46

## 2023-09-20 RX ADMIN — SENNA PLUS 2 TABLET(S): 8.6 TABLET ORAL at 21:47

## 2023-09-20 RX ADMIN — POLYETHYLENE GLYCOL 3350 17 GRAM(S): 17 POWDER, FOR SOLUTION ORAL at 21:47

## 2023-09-20 RX ADMIN — PANTOPRAZOLE SODIUM 40 MILLIGRAM(S): 20 TABLET, DELAYED RELEASE ORAL at 07:15

## 2023-09-20 RX ADMIN — SODIUM CHLORIDE 75 MILLILITER(S): 9 INJECTION, SOLUTION INTRAVENOUS at 12:37

## 2023-09-20 NOTE — DISCHARGE NOTE PROVIDER - CARE PROVIDER_API CALL
Sergei Macias  Orthopaedic Surgery  [address], [address]  [place] [address]  Phone: [phone]  Fax: [phone]  Follow Up Time:

## 2023-09-20 NOTE — DISCHARGE NOTE PROVIDER - NSDCCPCAREPLAN_GEN_ALL_CORE_FT
PRINCIPAL DISCHARGE DIAGNOSIS  Diagnosis: Primary osteoarthritis of right shoulder  Assessment and Plan of Treatment:

## 2023-09-20 NOTE — DISCHARGE NOTE PROVIDER - NSDCFUSCHEDAPPT_GEN_ALL_CORE_FT
Sergei Macias  [facility]  ORTHOSURG [address]  Scheduled Appointment: 10/02/2023    [facility]  ORTHOSURG [address]  Scheduled Appointment: 11/01/2023     Sergei Macias  [facility]  ORTHOSURG [address]  Scheduled Appointment: 10/02/2023    [facility]  ORTHOSURG [address]  Scheduled Appointment: 11/01/2023    [facility]  ORTHOSURG [address]  Scheduled Appointment: 12/20/2023

## 2023-09-20 NOTE — ASU PATIENT PROFILE, ADULT - NS PRO ABUSE SCREEN SUSPICION NEGLECT YN
Jayne Acuna Patient Age: 15 year old  MESSAGE: Interpreting service used: No      IM/FP- Letter Request- School         Letter needs to state: That patient got ill/vomit due to eating cheese last night, she has dairy issues and takes a medication called Dairy Aide to help. School needs letter stating this is not covid related. She needs this before she returns to school Thursday    Caller requesting to Send to [employer]/Patient Portal    Route message to provider's clinical support pool.         ALLERGIES:  Patient has no known allergies.  Current Outpatient Medications   Medication   • triamcinolone (ARISTOCORT) 0.1 % ointment   • albuterol 108 (90 Base) MCG/ACT inhaler   • hydroCORTisone (CORTIZONE) 2.5 % ointment   • cyproheptadine (PERIACTIN) 4 MG tablet   • rizatriptan (MAXALT-MLT) 5 MG disintegrating tablet     No current facility-administered medications for this visit.      PHARMACY to use: see below          Pharmacy preference(s) on file:   [id number] - [place], [place] - [address]  [address] [address]  Phone: [phone] Fax: [phone]      CALL BACK INFO: Ok to leave response (including medical information) on answering machine      PCP: Luz Maria Franco MD         INS: Payor: JAILYN/LINA / Plan: [employer] EMPLOYEE [id number] / Product Type: PPO MISC   PATIENT ADDRESS:  [address] [place] [id number]    
Mom notified  
Would need to be seen in [facility]  Unable to clear based on this phone call  
no

## 2023-09-20 NOTE — CONSULT NOTE ADULT - SUBJECTIVE AND OBJECTIVE BOX
PCP:   Ronnell Barba  Ortho:  [facility]    CHIEF COMPLAINT:   right shoulder pain    HISTORY OF THE PRESENT ILLNESS:  87 M with Hx of long standing osteoarthritis of the right shoulder and pain.  He has tried conservative strategies for pain control including anti-inflammatories, pain meds and steroid injections.  They have only helped for the short-term.  He has experienced progressive pain and progressive limited ROM of the right arm and shoulder.  This has negatively impacted his ability to perform his activities of daily living.  He was evaluated by Dr. Macias and found to have severe glenohumeral arthritis.  He underwent a right total shoulder replacement today.  In the PACU, his vital signs have been stable.  The hospitalist service has been consulted to assist with post-op medical management.    At this time, he reports that his fingers are numb and has mild pain, he denies any chest pain, shortness of breath, abdominal pain, nausea, or vomiting.    PAST MEDICAL HISTORY:  HTN  Osteoarthritis of the right shoulder  Diverticulosis  Lumbar stenosis  Cervical stenosis  Nephrolithiasis  Hyperlipidemia  Depression  Gout    PAST SURGICAL HISTORY:  s/p left replacement  s/p bilateral knee replacements  s/p inguinal hernia repair  s/p eye surgery    FAMILY HISTORY:     Mother - Breast cancer    SOCIAL HISTORY:  no smoking, occasional alcohol, no drugs    REVIEW OF SYSTEMS:   All other systems reviewed in detailed and found to be negative with the exception of what has already been described above    MEDICATIONS  (STANDING):  lactated ringers. 1000 milliLiter(s) (75 mL/Hr) IV Continuous <Continuous>  tranexamic acid IVPB 1000 milliGRAM(s) IV Intermittent once  tranexamic acid IVPB 1000 milliGRAM(s) IV Intermittent once    MEDICATIONS  (PRN):  HYDROmorphone  Injectable 0.5 milliGRAM(s) IV Push every 10 minutes PRN Moderate Pain (4 - 6)  ondansetron Injectable 4 milliGRAM(s) IV Push once PRN Nausea and/or Vomiting  oxyCODONE    IR 10 milliGRAM(s) Oral once PRN Severe Pain (7 - 10)    VITALS:  T(F): 97.1 (09-20-23 @ 11:15), Max: 97.3 (09-20-23 @ 06:46)  HR: 77 (09-20-23 @ 13:00) (72 - 85)  BP: 126/76 (09-20-23 @ 13:00) (110/60 - 150/93)  RR: 22 (09-20-23 @ 13:00) (15 - 27)  SpO2: 100% (09-20-23 @ 13:00) (95% - 100%)  I&O's Summary    20 Sep 2023 07:01  -  20 Sep 2023 13:11  --------------------------------------------------------  IN: 850 mL / OUT: 0 mL / NET: 850 mL    CAPILLARY BLOOD GLUCOSE  POCT Blood Glucose.: 113 mg/dL (20 Sep 2023 10:00)  POCT Blood Glucose.: 97 mg/dL (20 Sep 2023 06:54)    PHYSICAL EXAM:  HEENT:  pupils equal and reactive, EOMI, no oropharyngeal lesions, erythema, exudates, oral thrush  NECK:   supple, no carotid bruits, no palpable lymph nodes, no thyromegaly  CV:  +S1, +S2, regular, no murmurs or rubs  RESP:   lungs decreased BS bilaterally with some mild crackles at the left base, no wheezing  BREAST:  not examined  GI:  abdomen soft, non-tender, non-distended, normal BS, no bruits, no abdominal masses, no palpable masses  RECTAL:  not examined  :  not examined  MSK:   normal muscle tone, no atrophy, no rigidity, no contractions  EXT:  Right shoulder swelling and wound with dressing, right arm in sling, no LE edema  VASCULAR:  pulses equal and symmetric in the upper and lower extremities  NEURO:  AAOX3, no focal neurological deficits, follows all commands, able to move extremities spontaneously  SKIN:  no ulcers, lesions or rashes    LABS:                        11.6   10.63 )-----------( 292      ( 20 Sep 2023 10:50 )             35.2     09-20    139  |  107  |  17  ----------------------------<  134<H>  3.9   |  26  |  0.99    Ca    8.8      20 Sep 2023 10:50    Urinalysis Basic - ( 20 Sep 2023 10:50 )    Color: x / Appearance: x / SG: x / pH: x  Gluc: 134 mg/dL / Ketone: x  / Bili: x / Urobili: x   Blood: x / Protein: x / Nitrite: x   Leuk Esterase: x / RBC: x / WBC x   Sq Epi: x / Non Sq Epi: x / Bacteria: x    IMPRESSION:    S/P ELECTIVE RIGHT REVERSE SHOULDER REPLACEMENT FOR SEVERE OSTEOARTHRITIS, POD #0, EBL~200CC    HYPERTENSION    HYPERLIPIDEMIA      RECOMMENDATIONS:  -  pain control  -  incentive spirometry  -  OOB to chair  -  continue Amlopidine for BP control  -  check labs in am  -  wound care per ortho  -  d/c IVFs  -  neurovascular checks  -  CAPRINI score = 9, DVT prophylaxis with ASA and venodynes  -  complete perioperative ABXs  -  PT evaluation and treatment  -  change diet to low salt  -  continuous pulse oximetry on 2N  -  will follow with you     d/w patient and PACU RN    PCP:   Ronnell Barba  Ortho:  [facility]    CHIEF COMPLAINT:   right shoulder pain    HISTORY OF THE PRESENT ILLNESS:  87 M with Hx of long standing osteoarthritis of the right shoulder and pain.  He has tried conservative strategies for pain control including anti-inflammatories, pain meds and steroid injections.  They have only helped for the short-term.  He has experienced progressive pain and progressive limited ROM of the right arm and shoulder.  This has negatively impacted his ability to perform his activities of daily living.  He was evaluated by Dr. Macias and found to have severe glenohumeral arthritis.  He underwent a right total shoulder replacement today.  In the PACU, his vital signs have been stable.  The hospitalist service has been consulted to assist with post-op medical management.    At this time, he reports that his fingers are numb and has mild pain, he denies any chest pain, shortness of breath, abdominal pain, nausea, or vomiting.    PAST MEDICAL HISTORY:  HTN  Osteoarthritis of the right shoulder  Diverticulosis  Lumbar stenosis  Cervical stenosis  Nephrolithiasis  Hyperlipidemia  Depression  Gout    PAST SURGICAL HISTORY:  s/p left replacement  s/p bilateral knee replacements  s/p inguinal hernia repair  s/p eye surgery    FAMILY HISTORY:     Mother - Breast cancer    SOCIAL HISTORY:  no smoking, occasional alcohol, no drugs    REVIEW OF SYSTEMS:   All other systems reviewed in detailed and found to be negative with the exception of what has already been described above    MEDICATIONS  (STANDING):  lactated ringers. 1000 milliLiter(s) (75 mL/Hr) IV Continuous <Continuous>  tranexamic acid IVPB 1000 milliGRAM(s) IV Intermittent once  tranexamic acid IVPB 1000 milliGRAM(s) IV Intermittent once    MEDICATIONS  (PRN):  HYDROmorphone  Injectable 0.5 milliGRAM(s) IV Push every 10 minutes PRN Moderate Pain (4 - 6)  ondansetron Injectable 4 milliGRAM(s) IV Push once PRN Nausea and/or Vomiting  oxyCODONE    IR 10 milliGRAM(s) Oral once PRN Severe Pain (7 - 10)    VITALS:  T(F): 97.1 (09-20-23 @ 11:15), Max: 97.3 (09-20-23 @ 06:46)  HR: 77 (09-20-23 @ 13:00) (72 - 85)  BP: 126/76 (09-20-23 @ 13:00) (110/60 - 150/93)  RR: 22 (09-20-23 @ 13:00) (15 - 27)  SpO2: 100% (09-20-23 @ 13:00) (95% - 100%)  I&O's Summary    20 Sep 2023 07:01  -  20 Sep 2023 13:11  --------------------------------------------------------  IN: 850 mL / OUT: 0 mL / NET: 850 mL    CAPILLARY BLOOD GLUCOSE  POCT Blood Glucose.: 113 mg/dL (20 Sep 2023 10:00)  POCT Blood Glucose.: 97 mg/dL (20 Sep 2023 06:54)    PHYSICAL EXAM:  HEENT:  pupils equal and reactive, EOMI, no oropharyngeal lesions, erythema, exudates, oral thrush  NECK:   supple, no carotid bruits, no palpable lymph nodes, no thyromegaly  CV:  +S1, +S2, regular, no murmurs or rubs  RESP:   lungs decreased BS bilaterally with some mild crackles at the left base, no wheezing  BREAST:  not examined  GI:  abdomen soft, non-tender, non-distended, normal BS, no bruits, no abdominal masses, no palpable masses  RECTAL:  not examined  :  not examined  MSK:   normal muscle tone, no atrophy, no rigidity, no contractions  EXT:  Right shoulder swelling and wound with dressing, right arm in sling, no LE edema  VASCULAR:  pulses equal and symmetric in the upper and lower extremities  NEURO:  AAOX3, no focal neurological deficits, follows all commands, able to move extremities spontaneously  SKIN:  no ulcers, lesions or rashes    LABS:                        11.6   10.63 )-----------( 292      ( 20 Sep 2023 10:50 )             35.2     09-20    139  |  107  |  17  ----------------------------<  134<H>  3.9   |  26  |  0.99    Ca    8.8      20 Sep 2023 10:50    Urinalysis Basic - ( 20 Sep 2023 10:50 )    Color: x / Appearance: x / SG: x / pH: x  Gluc: 134 mg/dL / Ketone: x  / Bili: x / Urobili: x   Blood: x / Protein: x / Nitrite: x   Leuk Esterase: x / RBC: x / WBC x   Sq Epi: x / Non Sq Epi: x / Bacteria: x    IMPRESSION:    S/P ELECTIVE RIGHT REVERSE SHOULDER REPLACEMENT FOR SEVERE OSTEOARTHRITIS, POD #0, EBL~200CC    HYPERTENSION    HYPERLIPIDEMIA      RECOMMENDATIONS:  -  pain control  -  incentive spirometry  -  OOB to chair  -  continue Amlopidine for BP control  -  check labs in am  -  wound care per ortho  -  d/c IVFs  -  neurovascular checks  -  CAPRINI score = 9, DVT prophylaxis with ASA and venodynes  -  complete perioperative ABXs  -  PT evaluation and treatment  -  wean NC O2  -  change diet to low salt  -  continuous pulse oximetry on 2N  -  will follow with you     d/w patient and PACU RN

## 2023-09-20 NOTE — BRIEF OPERATIVE NOTE - NSICDXBRIEFPROCEDURE_GEN_ALL_CORE_FT
PROCEDURES:  Reverse total replacement of right shoulder joint 20-Sep-2023 09:37:08  Austin Quevedo

## 2023-09-20 NOTE — DISCHARGE NOTE PROVIDER - NSDCMRMEDTOKEN_GEN_ALL_CORE_FT
allopurinol 300 mg oral tablet: 1 orally once a day  amLODIPine 2.5 mg oral tablet: 1 orally once a day  Aspirin Enteric Coated 81 mg oral delayed release tablet: 1 tab(s) orally once a day MDD: 1  MiraLax oral powder for reconstitution: 17 gram(s) orally once a day as needed for  constipation  ondansetron 4 mg oral tablet: 1 tab(s) orally every 8 hours as needed for  nausea  oxyCODONE 5 mg oral tablet: 1 tab(s) orally every 4 hours as needed for  severe pain MDD: 6  Protonix 40 mg oral delayed release tablet: 1 tab(s) orally once a day  Senna 8.6 mg oral tablet: 2 tab(s) orally once a day (at bedtime) as needed for  constipation  Tylenol Extra Strength 500 mg oral tablet: 2 tab(s) orally every 8 hours

## 2023-09-20 NOTE — DISCHARGE NOTE PROVIDER - NSDCCPTREATMENT_GEN_ALL_CORE_FT
PRINCIPAL PROCEDURE  Procedure: Reverse total replacement of right shoulder joint  Findings and Treatment:

## 2023-09-20 NOTE — PATIENT PROFILE ADULT - FALL HARM RISK - HARM RISK INTERVENTIONS

## 2023-09-20 NOTE — BRIEF OPERATIVE NOTE - SPECIMENS
bone and soft tissue
Patient is 86 year old admitted with history of BPH, CAD, CABG, COPD, HTN,  dementia, presents for Gastrointestinal hemorrhage

## 2023-09-20 NOTE — DISCHARGE NOTE PROVIDER - HOSPITAL COURSE
H&P:  Pt is a 87y Male    PAST MEDICAL & SURGICAL HISTORY:  Nephrolithiasis      Osteoarthritis      Osteoarthritis of right shoulder      Kidney stone      Hematuria      Skin cancer      HTN (hypertension)      Gout      S/P ORIF (open reduction internal fixation) fracture  RLE/ jaw      H/O lithotripsy      H/O right inguinal hernia repair      History of total bilateral knee replacement      History of removal of retained hardware      S/P tonsillectomy      S/P hip replacement, left           Now s/p Right Reverse Total Shoulder Arthroplasty. Pt is afebrile with stable vital signs. Pain is controlled. Alert and Oriented. Exam reveals intact AIN/PIN, wrist flexion and wrist extension, 2+Radial Pulse. Dressing is clean and dry.    Hospital Course:  Patient presented to [facility] medically cleared for Right Reverse Total Shoulder Arthroplasty Replacement Surgery, having failed outpatient non-operative conservative management. Prophylactic antibiotics were started before the procedure and continued for 24 hours. They were admitted after surgery to the orthopedic floor. There were no complications during the hospital stay.     Routine consult was obtained from Physical Therapy for gait training, NO shoulder ROM. Consult to Hospitalist for Medical Co-management. Patient was placed on anticoagulation post-operatively while inhouse.  Pertinent home medications were continued.  Daily labs were followed.      On POD 0 there were no overnight events and the pt was OOB with PT. POD 1, PT was continued, and anticipate POD 1 DC to home. The orthopedic Attending is aware and agrees. H&P:  Pt is a 87y Male    PAST MEDICAL & SURGICAL HISTORY:  Nephrolithiasis      Osteoarthritis      Osteoarthritis of right shoulder      Kidney stone      Hematuria      Skin cancer      HTN (hypertension)      Gout      S/P ORIF (open reduction internal fixation) fracture  RLE/ jaw      H/O lithotripsy      H/O right inguinal hernia repair      History of total bilateral knee replacement      History of removal of retained hardware      S/P tonsillectomy      S/P hip replacement, left           Now s/p Right Reverse Total Shoulder Arthroplasty. Pt is afebrile with stable vital signs. Pain is controlled. Alert and Oriented. Exam reveals intact AIN/PIN, wrist flexion and wrist extension, 2+Radial Pulse. Dressing is clean and dry.    Hospital Course:  Patient presented to [facility] medically cleared for Right Reverse Total Shoulder Arthroplasty Replacement Surgery, having failed outpatient non-operative conservative management. Prophylactic antibiotics were started before the procedure and continued for 24 hours. They were admitted after surgery to the orthopedic floor. There were no complications during the hospital stay.     Routine consult was obtained from Physical Therapy for gait training, NO shoulder ROM. Consult to Hospitalist for Medical Co-management. Patient was placed on anticoagulation post-operatively while inhouse.  Pertinent home medications were continued.  Daily labs were followed.      On POD 0 there were no overnight events and the pt was OOB with PT. Patient continued to work with PT during hospital course. The orthopedic Attending is aware and agrees.

## 2023-09-20 NOTE — DISCHARGE NOTE PROVIDER - NSDCACTIVITY_GEN_ALL_CORE
_   ]
Showering allowed/Stairs allowed/Walking - Indoors allowed/No heavy lifting/straining/Walking - Outdoors allowed/Follow Instructions Provided by your Surgical Team

## 2023-09-20 NOTE — DISCHARGE NOTE PROVIDER - NSDCFUADDINST_GEN_ALL_CORE_FT
Discharge Instructions for Right Reverse Total Shoulder Arthroplasty:    PRESCRIPTIONS:   Pain- An eRx will be sent electronically to your pharmacy for  before DC or the day of.  Oxycodone is prescribed for severe pain. Wean off the Oxycodone as soon as your can. Additionally you can take Extra Strength Tylenol with the Oxycodone.      Stool Softener- We also recommend Miralax  stool softener which is over the counter to take daily while you are on Oxycodone.    Blood Clot Prevention- We have sent an electronic prescription to your pharmacy for Aspirin 81mg PO daily for 2 weeks to help prevent blood clot post-operatively.    If you need any refills on your medications, please call Dr. Macias’s office when you have a 3-day supply left. Some of the medications (narcotics) cannot be called into a pharmacy and the prescription will need to be picked up at the office or mailed to you. If you were taking other medications for blood pressure, heart problems etc., you should discuss this with your family physician.     BANDAGE/INCISION CARE : A two layer water-resistant dressing is applied during surgery. You can remove the outer layer 3 days after surgery (Post-op Day 3) or sooner if it gets wet. The inner layer will stay on until your first post-operative appointment. The inner dressing is waterproof so you may shower immediately but avoid soaking in bathtub or swimming pool.     Once your dressing is removed, you may leave your incision open to air or apply a dry gauze dressing. The sutures are dissolvable and Dermabond (surgical glue) is applied. Skin glue will gradually come off as incision heals. If you notice redness, swelling or drainage around incision, contact Dr. Macias’s office immediately.      SLING: For the first two to six weeks after your surgery, you will be wearing your sling the majority of time, coming out of it to complete exercises given to you by the therapists and Dr. Macias upon your discharge from the hospital. After your first post-operative visit, Dr. Macias may suggest you come out of your sling during the day to do activities in front of you.  Wearing the sling alerts others around you to be cautious and to avoid accidentally striking your arm. Also, during this time do not use your arm to pull or push yourself out of bed or out of a chair.     ACTIVITY: NO shoulder ROM. Bend and straighten elbow a few times daily so it doesn't get stiff. Walk Plenty. No sitting around. NO lifting with operative arm. See Detailed instructions in your packet.    ICE: Ice plenty and often during the first 2 weeks. Protect skin from direct ice using a towel or pillow case barrier.    PHYSICAL THERAPY: Dr. Macias prefers that his patients do NOT do formal Physical Therapy after Total Shoulder Replacement. He has found that most patients do well with progressing their motion and strength through normal daily activities. He has also found that sometimes physical therapy pushes patients too much causing increased pain and discomfort. At each post-operative appointment, Dr. Macias will teach you exercises to do own to work on range of motion. Although you should progress with some gentle range of motion, as demonstrated by Dr. Macias, do not force any shoulder motions. Most importantly, do not let anyone (family members, physical therapists, etc) force your arm into uncomfortable positions.      DRIVING: You are NOT permitted to drive a car while taking narcotic medication or when you are wearing a sling. Do not drive until after your first follow-up visit with your surgeon.     RETURNING TO WORK: Returning to work depends on the demands of your job and should be discussed with Dr. Macias before the surgery.     FOLLOW UP: Dr. Macias or his PA will need to see you for multiple appointments after your surgery. Typically, Dr. Macias orhis PA will see you back after 2 weeks, 6 weeks, 3 months and 6 months. You will be seen at 1 year, 2 years, 5 years and 10 year intervals thereafter.     **Please refer to patient post op info packet given to you at office visit for further details.

## 2023-09-21 ENCOUNTER — TRANSCRIPTION ENCOUNTER (OUTPATIENT)
Age: 88
End: 2023-09-21

## 2023-09-21 LAB
ANION GAP SERPL CALC-SCNC: 4 MMOL/L — LOW (ref 5–17)
BUN SERPL-MCNC: 19 MG/DL — SIGNIFICANT CHANGE UP (ref 7–23)
CALCIUM SERPL-MCNC: 8.9 MG/DL — SIGNIFICANT CHANGE UP (ref 8.5–10.1)
CHLORIDE SERPL-SCNC: 109 MMOL/L — HIGH (ref 96–108)
CO2 SERPL-SCNC: 25 MMOL/L — SIGNIFICANT CHANGE UP (ref 22–31)
CREAT SERPL-MCNC: 1 MG/DL — SIGNIFICANT CHANGE UP (ref 0.5–1.3)
EGFR: 73 ML/MIN/1.73M2 — SIGNIFICANT CHANGE UP
GLUCOSE BLDC GLUCOMTR-MCNC: 126 MG/DL — HIGH (ref 70–99)
GLUCOSE SERPL-MCNC: 132 MG/DL — HIGH (ref 70–99)
HCT VFR BLD CALC: 31.7 % — LOW (ref 39–50)
HGB BLD-MCNC: 10.5 G/DL — LOW (ref 13–17)
MCHC RBC-ENTMCNC: 30.1 PG — SIGNIFICANT CHANGE UP (ref 27–34)
MCHC RBC-ENTMCNC: 33.1 GM/DL — SIGNIFICANT CHANGE UP (ref 32–36)
MCV RBC AUTO: 90.8 FL — SIGNIFICANT CHANGE UP (ref 80–100)
PLATELET # BLD AUTO: 307 K/UL — SIGNIFICANT CHANGE UP (ref 150–400)
POTASSIUM SERPL-MCNC: 4.6 MMOL/L — SIGNIFICANT CHANGE UP (ref 3.5–5.3)
POTASSIUM SERPL-SCNC: 4.6 MMOL/L — SIGNIFICANT CHANGE UP (ref 3.5–5.3)
RBC # BLD: 3.49 M/UL — LOW (ref 4.2–5.8)
RBC # FLD: 14.7 % — HIGH (ref 10.3–14.5)
SODIUM SERPL-SCNC: 138 MMOL/L — SIGNIFICANT CHANGE UP (ref 135–145)
WBC # BLD: 14.37 K/UL — HIGH (ref 3.8–10.5)
WBC # FLD AUTO: 14.37 K/UL — HIGH (ref 3.8–10.5)

## 2023-09-21 PROCEDURE — 99213 OFFICE O/P EST LOW 20 MIN: CPT

## 2023-09-21 RX ORDER — ACETAMINOPHEN 500 MG
1000 TABLET ORAL ONCE
Refills: 0 | Status: COMPLETED | OUTPATIENT
Start: 2023-09-21 | End: 2023-09-21

## 2023-09-21 RX ADMIN — Medication 101.6 MILLIGRAM(S): at 05:45

## 2023-09-21 RX ADMIN — OXYCODONE HYDROCHLORIDE 10 MILLIGRAM(S): 5 TABLET ORAL at 13:46

## 2023-09-21 RX ADMIN — Medication 1000 MILLIGRAM(S): at 22:10

## 2023-09-21 RX ADMIN — Medication 400 MILLIGRAM(S): at 11:05

## 2023-09-21 RX ADMIN — Medication 1000 MILLIGRAM(S): at 05:46

## 2023-09-21 RX ADMIN — Medication 325 MILLIGRAM(S): at 09:20

## 2023-09-21 RX ADMIN — OXYCODONE HYDROCHLORIDE 10 MILLIGRAM(S): 5 TABLET ORAL at 22:07

## 2023-09-21 RX ADMIN — Medication 1000 MILLIGRAM(S): at 11:05

## 2023-09-21 RX ADMIN — OXYCODONE HYDROCHLORIDE 10 MILLIGRAM(S): 5 TABLET ORAL at 09:21

## 2023-09-21 RX ADMIN — OXYCODONE HYDROCHLORIDE 10 MILLIGRAM(S): 5 TABLET ORAL at 02:55

## 2023-09-21 RX ADMIN — OXYCODONE HYDROCHLORIDE 10 MILLIGRAM(S): 5 TABLET ORAL at 02:25

## 2023-09-21 RX ADMIN — OXYCODONE HYDROCHLORIDE 10 MILLIGRAM(S): 5 TABLET ORAL at 22:37

## 2023-09-21 RX ADMIN — OXYCODONE HYDROCHLORIDE 10 MILLIGRAM(S): 5 TABLET ORAL at 17:32

## 2023-09-21 RX ADMIN — OXYCODONE HYDROCHLORIDE 10 MILLIGRAM(S): 5 TABLET ORAL at 10:15

## 2023-09-21 RX ADMIN — AMLODIPINE BESYLATE 2.5 MILLIGRAM(S): 2.5 TABLET ORAL at 09:20

## 2023-09-21 RX ADMIN — PANTOPRAZOLE SODIUM 40 MILLIGRAM(S): 20 TABLET, DELAYED RELEASE ORAL at 05:45

## 2023-09-21 RX ADMIN — Medication 100 MILLIGRAM(S): at 05:06

## 2023-09-21 RX ADMIN — OXYCODONE HYDROCHLORIDE 10 MILLIGRAM(S): 5 TABLET ORAL at 14:40

## 2023-09-21 RX ADMIN — Medication 1000 MILLIGRAM(S): at 22:07

## 2023-09-21 RX ADMIN — Medication 300 MILLIGRAM(S): at 09:20

## 2023-09-21 RX ADMIN — OXYCODONE HYDROCHLORIDE 10 MILLIGRAM(S): 5 TABLET ORAL at 18:25

## 2023-09-21 NOTE — OCCUPATIONAL THERAPY INITIAL EVALUATION ADULT - LIVES WITH, PROFILE
Pt reports living alone with 2 steps to enter, no steps inside, walkin shower with bench, GB, RTS IND prior

## 2023-09-21 NOTE — DISCHARGE NOTE NURSING/CASE MANAGEMENT/SOCIAL WORK - NSDCPEFALRISK_GEN_ALL_CORE
For information on Fall & Injury Prevention, visit: [email or web address] OR  [email or web address] OR  https://www.cdc.gov/steadi/patient.html

## 2023-09-21 NOTE — OCCUPATIONAL THERAPY INITIAL EVALUATION ADULT - GENERAL OBSERVATIONS, REHAB EVAL
Patient received semi-supine in bed, NAD, BP: 118/60   HR: 82 o2:  95 . Patient left seated in chair with chair alarm on    , VSS, NAD, all lines intact and all items within reach.

## 2023-09-21 NOTE — OCCUPATIONAL THERAPY INITIAL EVALUATION ADULT - PERSONAL SAFETY AND JUDGMENT, REHAB EVAL
pt unaware of RUE precautions, recalls all precautions by end of session, maintains NON WB throughout session/impaired

## 2023-09-21 NOTE — PHYSICAL THERAPY INITIAL EVALUATION ADULT - PERTINENT HX OF CURRENT PROBLEM, REHAB EVAL
Per PST H&P: 88 y/o male with right shoulder OA, PMH of HTN, Gout. Pt reports right shoulder pain for 2 yrs now, he takes NSAID with mild pain relief. He is here for PST for planned Right reverse shoulder replacement.

## 2023-09-21 NOTE — OCCUPATIONAL THERAPY INITIAL EVALUATION ADULT - ADL RETRAINING, OT EVAL
Patient will participate in lower body dressing with MOD I in 2 weeks  Patient will participate in toilet transfer with MOD I   in 2 weeks  Patient will participate in toileting with  MOD I    in 2 weeks  Patient will participate in grooming standing at the sink with MOD I   in 2 weeks

## 2023-09-21 NOTE — PHYSICAL THERAPY INITIAL EVALUATION ADULT - GENERAL OBSERVATIONS, REHAB EVAL
The pt was pleasant and cooperative, received on 2N, supine and eager to participate in PT evaluation. Right UE secured in a sling, 1 IV, and bilateral SCDs in place.

## 2023-09-21 NOTE — PHYSICAL THERAPY INITIAL EVALUATION ADULT - CRITERIA FOR SKILLED THERAPEUTIC INTERVENTIONS
No Need for continued skilled acute care PT services at present, the pt should ambulate under nursing supervision, RN aware. '/anticipated discharge recommendation

## 2023-09-21 NOTE — OCCUPATIONAL THERAPY INITIAL EVALUATION ADULT - NSACTIVITYREC_GEN_A_OT
Patient educated on RUE   precautions and how to participate in dressing tasks, grooming tasks, toileting tasks, bed mobility and  all functional transfers while abiding by precautions. Handout provided to promote carryover. Pt educated on DME and AE throughout session and how to self purchase . pt educated on RUE distal AROM exercises within precautions as well as pendulum exercises-good understanding and teachback, handout provided to promote carryover

## 2023-09-21 NOTE — DISCHARGE NOTE NURSING/CASE MANAGEMENT/SOCIAL WORK - PATIENT PORTAL LINK FT
You can access the FollowMyHealth Patient Portal offered by [facility] by registering at the following website: [email or web address]. By joining [employer]’s FollowMyHealth portal, you will also be able to view your health information using other applications (apps) compatible with our system.

## 2023-09-22 LAB
ANION GAP SERPL CALC-SCNC: 4 MMOL/L — LOW (ref 5–17)
BUN SERPL-MCNC: 22 MG/DL — SIGNIFICANT CHANGE UP (ref 7–23)
CALCIUM SERPL-MCNC: 8.9 MG/DL — SIGNIFICANT CHANGE UP (ref 8.5–10.1)
CHLORIDE SERPL-SCNC: 108 MMOL/L — SIGNIFICANT CHANGE UP (ref 96–108)
CO2 SERPL-SCNC: 28 MMOL/L — SIGNIFICANT CHANGE UP (ref 22–31)
CREAT SERPL-MCNC: 0.95 MG/DL — SIGNIFICANT CHANGE UP (ref 0.5–1.3)
EGFR: 78 ML/MIN/1.73M2 — SIGNIFICANT CHANGE UP
GLUCOSE SERPL-MCNC: 106 MG/DL — HIGH (ref 70–99)
HCT VFR BLD CALC: 29.3 % — LOW (ref 39–50)
HGB BLD-MCNC: 9.4 G/DL — LOW (ref 13–17)
MCHC RBC-ENTMCNC: 29.7 PG — SIGNIFICANT CHANGE UP (ref 27–34)
MCHC RBC-ENTMCNC: 32.1 GM/DL — SIGNIFICANT CHANGE UP (ref 32–36)
MCV RBC AUTO: 92.7 FL — SIGNIFICANT CHANGE UP (ref 80–100)
PLATELET # BLD AUTO: 278 K/UL — SIGNIFICANT CHANGE UP (ref 150–400)
POTASSIUM SERPL-MCNC: 4.5 MMOL/L — SIGNIFICANT CHANGE UP (ref 3.5–5.3)
POTASSIUM SERPL-SCNC: 4.5 MMOL/L — SIGNIFICANT CHANGE UP (ref 3.5–5.3)
RBC # BLD: 3.16 M/UL — LOW (ref 4.2–5.8)
RBC # FLD: 15.1 % — HIGH (ref 10.3–14.5)
SODIUM SERPL-SCNC: 140 MMOL/L — SIGNIFICANT CHANGE UP (ref 135–145)
WBC # BLD: 9.28 K/UL — SIGNIFICANT CHANGE UP (ref 3.8–10.5)
WBC # FLD AUTO: 9.28 K/UL — SIGNIFICANT CHANGE UP (ref 3.8–10.5)

## 2023-09-22 PROCEDURE — 99212 OFFICE O/P EST SF 10 MIN: CPT

## 2023-09-22 RX ADMIN — Medication 1000 MILLIGRAM(S): at 21:10

## 2023-09-22 RX ADMIN — PANTOPRAZOLE SODIUM 40 MILLIGRAM(S): 20 TABLET, DELAYED RELEASE ORAL at 05:18

## 2023-09-22 RX ADMIN — Medication 300 MILLIGRAM(S): at 10:20

## 2023-09-22 RX ADMIN — Medication 1000 MILLIGRAM(S): at 13:50

## 2023-09-22 RX ADMIN — Medication 325 MILLIGRAM(S): at 10:20

## 2023-09-22 RX ADMIN — POLYETHYLENE GLYCOL 3350 17 GRAM(S): 17 POWDER, FOR SOLUTION ORAL at 21:11

## 2023-09-22 RX ADMIN — SENNA PLUS 2 TABLET(S): 8.6 TABLET ORAL at 21:11

## 2023-09-22 RX ADMIN — AMLODIPINE BESYLATE 2.5 MILLIGRAM(S): 2.5 TABLET ORAL at 10:20

## 2023-09-22 RX ADMIN — Medication 1000 MILLIGRAM(S): at 05:18

## 2023-09-22 RX ADMIN — Medication 1000 MILLIGRAM(S): at 06:10

## 2023-09-23 ENCOUNTER — INPATIENT (INPATIENT)
Facility: HOSPITAL | Age: 88
LOS: 11 days | Discharge: HOME | DRG: 876 | End: 2023-10-05
Attending: HOSPITALIST | Admitting: INTERNAL MEDICINE
Payer: MEDICARE

## 2023-09-23 VITALS
WEIGHT: 195.11 LBS | RESPIRATION RATE: 16 BRPM | DIASTOLIC BLOOD PRESSURE: 62 MMHG | HEIGHT: 69 IN | OXYGEN SATURATION: 98 % | TEMPERATURE: 98 F | SYSTOLIC BLOOD PRESSURE: 158 MMHG | HEART RATE: 75 BPM

## 2023-09-23 VITALS
OXYGEN SATURATION: 94 % | HEART RATE: 90 BPM | RESPIRATION RATE: 18 BRPM | DIASTOLIC BLOOD PRESSURE: 85 MMHG | SYSTOLIC BLOOD PRESSURE: 118 MMHG | TEMPERATURE: 98 F

## 2023-09-23 DIAGNOSIS — Z96.642 PRESENCE OF LEFT ARTIFICIAL HIP JOINT: Chronic | ICD-10-CM

## 2023-09-23 DIAGNOSIS — Z98.890 OTHER SPECIFIED POSTPROCEDURAL STATES: Chronic | ICD-10-CM

## 2023-09-23 DIAGNOSIS — Z90.89 ACQUIRED ABSENCE OF OTHER ORGANS: Chronic | ICD-10-CM

## 2023-09-23 DIAGNOSIS — Z96.653 PRESENCE OF ARTIFICIAL KNEE JOINT, BILATERAL: Chronic | ICD-10-CM

## 2023-09-23 LAB
ALBUMIN SERPL ELPH-MCNC: 3.2 G/DL — LOW (ref 3.3–5)
ALP SERPL-CCNC: 131 U/L — HIGH (ref 40–120)
ALT FLD-CCNC: 35 U/L — SIGNIFICANT CHANGE UP (ref 12–78)
ANION GAP SERPL CALC-SCNC: 5 MMOL/L — SIGNIFICANT CHANGE UP (ref 5–17)
ANION GAP SERPL CALC-SCNC: 7 MMOL/L — SIGNIFICANT CHANGE UP (ref 5–17)
APPEARANCE UR: CLEAR — SIGNIFICANT CHANGE UP
AST SERPL-CCNC: 26 U/L — SIGNIFICANT CHANGE UP (ref 15–37)
BASOPHILS # BLD AUTO: 0.06 K/UL — SIGNIFICANT CHANGE UP (ref 0–0.2)
BASOPHILS NFR BLD AUTO: 0.5 % — SIGNIFICANT CHANGE UP (ref 0–2)
BILIRUB SERPL-MCNC: 0.5 MG/DL — SIGNIFICANT CHANGE UP (ref 0.2–1.2)
BILIRUB UR-MCNC: NEGATIVE — SIGNIFICANT CHANGE UP
BUN SERPL-MCNC: 20 MG/DL — SIGNIFICANT CHANGE UP (ref 7–23)
BUN SERPL-MCNC: 20 MG/DL — SIGNIFICANT CHANGE UP (ref 7–23)
CALCIUM SERPL-MCNC: 9.6 MG/DL — SIGNIFICANT CHANGE UP (ref 8.5–10.1)
CALCIUM SERPL-MCNC: 9.9 MG/DL — SIGNIFICANT CHANGE UP (ref 8.5–10.1)
CHLORIDE SERPL-SCNC: 107 MMOL/L — SIGNIFICANT CHANGE UP (ref 96–108)
CHLORIDE SERPL-SCNC: 107 MMOL/L — SIGNIFICANT CHANGE UP (ref 96–108)
CO2 SERPL-SCNC: 26 MMOL/L — SIGNIFICANT CHANGE UP (ref 22–31)
CO2 SERPL-SCNC: 29 MMOL/L — SIGNIFICANT CHANGE UP (ref 22–31)
COLOR SPEC: YELLOW — SIGNIFICANT CHANGE UP
CREAT SERPL-MCNC: 0.88 MG/DL — SIGNIFICANT CHANGE UP (ref 0.5–1.3)
CREAT SERPL-MCNC: 1.13 MG/DL — SIGNIFICANT CHANGE UP (ref 0.5–1.3)
DIFF PNL FLD: NEGATIVE — SIGNIFICANT CHANGE UP
EGFR: 63 ML/MIN/1.73M2 — SIGNIFICANT CHANGE UP
EGFR: 84 ML/MIN/1.73M2 — SIGNIFICANT CHANGE UP
EOSINOPHIL # BLD AUTO: 0.1 K/UL — SIGNIFICANT CHANGE UP (ref 0–0.5)
EOSINOPHIL NFR BLD AUTO: 0.8 % — SIGNIFICANT CHANGE UP (ref 0–6)
GLUCOSE SERPL-MCNC: 106 MG/DL — HIGH (ref 70–99)
GLUCOSE SERPL-MCNC: 123 MG/DL — HIGH (ref 70–99)
GLUCOSE UR QL: NEGATIVE — SIGNIFICANT CHANGE UP
HCT VFR BLD CALC: 34.7 % — LOW (ref 39–50)
HCT VFR BLD CALC: 37 % — LOW (ref 39–50)
HGB BLD-MCNC: 11 G/DL — LOW (ref 13–17)
HGB BLD-MCNC: 12.3 G/DL — LOW (ref 13–17)
IMM GRANULOCYTES NFR BLD AUTO: 0.8 % — SIGNIFICANT CHANGE UP (ref 0–0.9)
KETONES UR-MCNC: NEGATIVE — SIGNIFICANT CHANGE UP
LEUKOCYTE ESTERASE UR-ACNC: ABNORMAL
LYMPHOCYTES # BLD AUTO: 16.8 % — SIGNIFICANT CHANGE UP (ref 13–44)
LYMPHOCYTES # BLD AUTO: 2.09 K/UL — SIGNIFICANT CHANGE UP (ref 1–3.3)
MCHC RBC-ENTMCNC: 29.6 PG — SIGNIFICANT CHANGE UP (ref 27–34)
MCHC RBC-ENTMCNC: 30.7 PG — SIGNIFICANT CHANGE UP (ref 27–34)
MCHC RBC-ENTMCNC: 31.7 GM/DL — LOW (ref 32–36)
MCHC RBC-ENTMCNC: 33.2 GM/DL — SIGNIFICANT CHANGE UP (ref 32–36)
MCV RBC AUTO: 92.3 FL — SIGNIFICANT CHANGE UP (ref 80–100)
MCV RBC AUTO: 93.3 FL — SIGNIFICANT CHANGE UP (ref 80–100)
MONOCYTES # BLD AUTO: 0.95 K/UL — HIGH (ref 0–0.9)
MONOCYTES NFR BLD AUTO: 7.6 % — SIGNIFICANT CHANGE UP (ref 2–14)
NEUTROPHILS # BLD AUTO: 9.16 K/UL — HIGH (ref 1.8–7.4)
NEUTROPHILS NFR BLD AUTO: 73.5 % — SIGNIFICANT CHANGE UP (ref 43–77)
NITRITE UR-MCNC: NEGATIVE — SIGNIFICANT CHANGE UP
PH UR: 6 — SIGNIFICANT CHANGE UP (ref 5–8)
PLATELET # BLD AUTO: 367 K/UL — SIGNIFICANT CHANGE UP (ref 150–400)
PLATELET # BLD AUTO: 495 K/UL — HIGH (ref 150–400)
POTASSIUM SERPL-MCNC: 3.8 MMOL/L — SIGNIFICANT CHANGE UP (ref 3.5–5.3)
POTASSIUM SERPL-MCNC: 4.2 MMOL/L — SIGNIFICANT CHANGE UP (ref 3.5–5.3)
POTASSIUM SERPL-SCNC: 3.8 MMOL/L — SIGNIFICANT CHANGE UP (ref 3.5–5.3)
POTASSIUM SERPL-SCNC: 4.2 MMOL/L — SIGNIFICANT CHANGE UP (ref 3.5–5.3)
PROT SERPL-MCNC: 7.7 GM/DL — SIGNIFICANT CHANGE UP (ref 6–8.3)
PROT UR-MCNC: NEGATIVE — SIGNIFICANT CHANGE UP
RBC # BLD: 3.72 M/UL — LOW (ref 4.2–5.8)
RBC # BLD: 4.01 M/UL — LOW (ref 4.2–5.8)
RBC # FLD: 14.7 % — HIGH (ref 10.3–14.5)
RBC # FLD: 14.9 % — HIGH (ref 10.3–14.5)
SODIUM SERPL-SCNC: 140 MMOL/L — SIGNIFICANT CHANGE UP (ref 135–145)
SODIUM SERPL-SCNC: 141 MMOL/L — SIGNIFICANT CHANGE UP (ref 135–145)
SP GR SPEC: 1.01 — SIGNIFICANT CHANGE UP (ref 1.01–1.02)
UROBILINOGEN FLD QL: NEGATIVE — SIGNIFICANT CHANGE UP
WBC # BLD: 12.46 K/UL — HIGH (ref 3.8–10.5)
WBC # BLD: 7.56 K/UL — SIGNIFICANT CHANGE UP (ref 3.8–10.5)
WBC # FLD AUTO: 12.46 K/UL — HIGH (ref 3.8–10.5)
WBC # FLD AUTO: 7.56 K/UL — SIGNIFICANT CHANGE UP (ref 3.8–10.5)

## 2023-09-23 PROCEDURE — 73030 X-RAY EXAM OF SHOULDER: CPT | Mod: 26,RT

## 2023-09-23 PROCEDURE — 99212 OFFICE O/P EST SF 10 MIN: CPT

## 2023-09-23 PROCEDURE — G1004: CPT

## 2023-09-23 PROCEDURE — 71045 X-RAY EXAM CHEST 1 VIEW: CPT | Mod: 26

## 2023-09-23 PROCEDURE — 70450 CT HEAD/BRAIN W/O DYE: CPT | Mod: 26,MG

## 2023-09-23 PROCEDURE — 99285 EMERGENCY DEPT VISIT HI MDM: CPT

## 2023-09-23 RX ORDER — HALOPERIDOL DECANOATE 100 MG/ML
5 INJECTION INTRAMUSCULAR ONCE
Refills: 0 | Status: COMPLETED | OUTPATIENT
Start: 2023-09-23 | End: 2023-09-23

## 2023-09-23 RX ADMIN — AMLODIPINE BESYLATE 2.5 MILLIGRAM(S): 2.5 TABLET ORAL at 09:52

## 2023-09-23 RX ADMIN — PANTOPRAZOLE SODIUM 40 MILLIGRAM(S): 20 TABLET, DELAYED RELEASE ORAL at 05:11

## 2023-09-23 RX ADMIN — Medication 2 MILLIGRAM(S): at 22:30

## 2023-09-23 RX ADMIN — Medication 2 MILLIGRAM(S): at 18:02

## 2023-09-23 RX ADMIN — Medication 1000 MILLIGRAM(S): at 05:11

## 2023-09-23 RX ADMIN — Medication 300 MILLIGRAM(S): at 09:51

## 2023-09-23 RX ADMIN — Medication 325 MILLIGRAM(S): at 09:52

## 2023-09-23 RX ADMIN — HALOPERIDOL DECANOATE 5 MILLIGRAM(S): 100 INJECTION INTRAMUSCULAR at 18:02

## 2023-09-23 RX ADMIN — HALOPERIDOL DECANOATE 5 MILLIGRAM(S): 100 INJECTION INTRAMUSCULAR at 22:02

## 2023-09-23 NOTE — ED ADULT NURSE NOTE - OBJECTIVE STATEMENT
BIBEMS for agitation, pt discharged this morning from  ED s/p right shoulder surgery on 9/20, was taken  to [facility] for rehab where staff requested to send pt back to ER because of behavioral misconduct. pt arrived to ED calm and not exhibiting any signs of agitation within approx 45 mins of being here pt became uncooperative irrate cursing screaming at staff.  JENNIFER marshall called and pt medicated as per md orders, pt placed on cardiac monitor spoke with RNs on 2N who stated pt has hx of dementia while in  for admission he did not have any episodes of being uncooperative

## 2023-09-23 NOTE — ED PROVIDER NOTE - NSICDXFAMILYHX_GEN_ALL_CORE_FT
FAMILY HISTORY:  Mother  Still living? No  Family history of breast cancer, Age at diagnosis: Age Unknown    Sibling  Still living? No  Family history of breast cancer, Age at diagnosis: 61-70

## 2023-09-23 NOTE — ED ADULT NURSE NOTE - NSFALLHARMRISKINTERV_ED_ALL_ED
Assistance OOB with selected safe patient handling equipment if applicable/Communicate risk of Fall with Harm to all staff, patient, and family/Monitor for mental status changes and reorient to person, place, and time, as needed/Move patient closer to nursing station/within visual sight of ED staff/Provide visual cue: red socks, yellow wristband, yellow gown, etc/Reinforce activity limits and safety measures with patient and family/Toileting schedule using arm’s reach rule for commode and bathroom/Use of alarms - bed, stretcher, chair and/or video monitoring/Bed in lowest position, wheels locked, appropriate side rails in place/Call bell, personal items and telephone in reach/Instruct patient to call for assistance before getting out of bed/chair/stretcher/Non-slip footwear applied when patient is off stretcher/[place] to call system/Physically safe environment - no spills, clutter or unnecessary equipment/Purposeful Proactive Rounding/Room/bathroom lighting operational, light cord in reach

## 2023-09-23 NOTE — ED ADULT NURSE NOTE - NS ED NOTE  TALK SOMEONE YN
Pt attended phase II visit.  Tolerated exercise well, NAD noted, no complaints voiced, skin warm, pink, dry, resp nl and even, denies chest discomfort, denies SOA.   Monitor shows SB-NSR with BBB noted , V/S WDL ,see Alvaro documentation for exercise data.  Dr. Cordero   physician immediately available       Patient stated he had an Echo at [facility]. A copy was requested.    No

## 2023-09-23 NOTE — PROGRESS NOTE ADULT - SUBJECTIVE AND OBJECTIVE BOX
Patient seen and examined at bedside. Patient reports pain well controlled on medications. No acute events overnight. Pt denies fevers, chills, new onset numbness, weakness or tingling in the extremities.    T(C): 36.4 (09-22-23 @ 04:00), Max: 36.5 (09-21-23 @ 08:00)  T(F): 97.5 (09-22-23 @ 04:00), Max: 97.7 (09-21-23 @ 08:00)  HR: 86 (09-22-23 @ 04:00) (72 - 86)  BP: 113/72 (09-22-23 @ 04:00) (113/69 - 144/89)  RR: 18 (09-22-23 @ 04:00) (18 - 18)  SpO2: 99% (09-22-23 @ 04:00) (97% - 100%)  General: NAD, resting comfortably in bed  RUE:   Dressing C/D/I  Compartments soft and compressible  No calf tenderness bilaterally  +Axillary/Musculocutaneous/Radial/Median/AIN/PIN intact  SILT C5-T1  + radial pulses      A/P:  87y m s/p R rTSA 9/20    -PT/OT   -NWB on the RUE in [facility]   -Pain Control  -DVT ppx w/ A325  -FU AM Labs  -Rest, ice, compress and elevate the extremity as we needed  -Incentive Spirometry  -Medical management appreciated  -Dispo: Home today
Orthopedic Post-op Check    POD 0 RIGHT reverse Total Shoulder Arthroplasty  Pain controlled. Able to wiggle fingers   Digits WTT distally. Voiding spontaneously. No n/v.    Vital Signs Last 24 Hrs  T(C): 36.5 (09-20-23 @ 16:00), Max: 36.7 (09-20-23 @ 14:29)  T(F): 97.7 (09-20-23 @ 16:00), Max: 98 (09-20-23 @ 14:29)  HR: 96 (09-20-23 @ 16:00) (70 - 96)  BP: 118/76 (09-20-23 @ 16:00) (110/60 - 150/93)  BP(mean): 89 (09-20-23 @ 16:00) (89 - 89)  RR: 17 (09-20-23 @ 16:00) (14 - 27)  SpO2: 100% (09-20-23 @ 16:00) (95% - 100%)          NAD AAOx3  Dressing Clean and dry  Sling  Able to wiggle fingers   Digits WTT distally  AROM: Intact AIN PIN, wrist ext/flex; SILT all 5 digits  2+ Radial Pulse        A/P:  Stable POD 0 RIGHT Reverse Total Shoulder Arthroplasty  -Analgesia  -ppx ABX  -DVT PE ppx  -OOB ambulate  -NO ROM to shoulder. Elbow ROM OK
Patient seen and examined at bedside. No acute complaints at this time. Pain well controlled. Denies chest pain, shortness of breath, nausea or vomiting.     PE:  Vital Signs Last 24 Hrs  T(C): 36.9 (09-21-23 @ 04:00), Max: 36.9 (09-21-23 @ 04:00)  T(F): 98.4 (09-21-23 @ 04:00), Max: 98.4 (09-21-23 @ 04:00)  HR: 74 (09-21-23 @ 04:00) (70 - 96)  BP: 135/74 (09-21-23 @ 04:00) (110/60 - 155/69)  BP(mean): 89 (09-20-23 @ 16:00) (89 - 89)  RR: 18 (09-21-23 @ 04:00) (14 - 27)  SpO2: 99% (09-21-23 @ 04:00) (95% - 100%)    General: NAD, resting comfortably in bed  RUE:   Dressing C/D/I  Compartments soft and compressible  No calf tenderness bilaterally  +Axillary/Musculocutaneous/Radial/Median/AIN/PIN intact  SILT C5-T1  + radial pulses                          11.6   10.63 )-----------( 292      ( 20 Sep 2023 10:50 )             35.2     09-20    139  |  107  |  17  ----------------------------<  134<H>  3.9   |  26  |  0.99    Ca    8.8      20 Sep 2023 10:50          A/P:  87y m s/p R rTSA 9/20    -PT/OT   -NWB on the RUE in [facility]   -Pain Control  -DVT ppx w/ A325  -Continue perioperative abx x 24 hours  -FU AM Labs  -Rest, ice, compress and elevate the extremity as we needed  -Incentive Spirometry  -Medical management appreciated  -Dispo: Per PT after functional assessment/gait training, suspect home today 
Patient seen and examined at bedside. Pain well controlled with medication. Patient denies any numbness, tingling, weakness, or any other orthopaedic complaint.     LABS:                        9.4    9.28  )-----------( 278      ( 22 Sep 2023 07:13 )             29.3     09-22    140  |  108  |  22  ----------------------------<  106<H>  4.5   |  28  |  0.95    Ca    8.9      22 Sep 2023 07:13        Urinalysis Basic - ( 22 Sep 2023 07:13 )    Color: x / Appearance: x / SG: x / pH: x  Gluc: 106 mg/dL / Ketone: x  / Bili: x / Urobili: x   Blood: x / Protein: x / Nitrite: x   Leuk Esterase: x / RBC: x / WBC x   Sq Epi: x / Non Sq Epi: x / Bacteria: x        VITAL SIGNS:  T(C): 36.5 (09-23-23 @ 00:10), Max: 36.7 (09-22-23 @ 16:00)  HR: 73 (09-23-23 @ 00:10) (57 - 73)  BP: 129/93 (09-23-23 @ 00:10) (115/73 - 129/93)  RR: 18 (09-23-23 @ 00:10) (18 - 18)  SpO2: 99% (09-23-23 @ 00:10) (99% - 100%)      General: NAD, resting comfortably in bed  RUE:   Dressing C/D/I  Compartments soft and compressible  No calf tenderness bilaterally  +Axillary/Musculocutaneous/Radial/Median/AIN/PIN intact  SILT C5-T1  + radial pulses      A/P:  87y m s/p R rTSA POD3    -PT/OT   -NWB on the RUE in [facility]   -Pain Control  -DVT ppx w/ A325  -FU AM Labs  -Rest, ice, compress and elevate the extremity as needed  -Incentive Spirometry  -Medical management appreciated  -Dispo: Home today  -Ortho stable for DC
   PCP:   Ronnell Barba  Ortho:  [facility]    CHIEF COMPLAINT:   right shoulder pain    HISTORY OF THE PRESENT ILLNESS:  87 M with Hx of long standing osteoarthritis of the right shoulder and pain.  He has tried conservative strategies for pain control including anti-inflammatories, pain meds and steroid injections.  They have only helped for the short-term.  He has experienced progressive pain and progressive limited ROM of the right arm and shoulder.  This has negatively impacted his ability to perform his activities of daily living.  He was evaluated by Dr. Macias and found to have severe glenohumeral arthritis.  He underwent a right total shoulder replacement today.  In the PACU, his vital signs have been stable.  The hospitalist service has been consulted to assist with post-op medical management.    9/21/23- up and ambulating in the hallway independently, denies pain    PAST MEDICAL HISTORY:  HTN  Osteoarthritis of the right shoulder  Diverticulosis  Lumbar stenosis  Cervical stenosis  Nephrolithiasis  Hyperlipidemia  Depression  Gout    PAST SURGICAL HISTORY:  s/p left replacement  s/p bilateral knee replacements  s/p inguinal hernia repair  s/p eye surgery    FAMILY HISTORY:     Mother - Breast cancer    SOCIAL HISTORY:  no smoking, occasional alcohol, no drugs    REVIEW OF SYSTEMS:   All other systems reviewed in detailed and found to be negative with the exception of what has already been described above    Vital Signs Last 24 Hrs  T(C): 36.5 (21 Sep 2023 11:59), Max: 36.9 (21 Sep 2023 04:00)  T(F): 97.7 (21 Sep 2023 11:59), Max: 98.4 (21 Sep 2023 04:00)  HR: 72 (21 Sep 2023 11:59) (72 - 96)  BP: 113/69 (21 Sep 2023 11:59) (113/69 - 155/69)  BP(mean): 89 (20 Sep 2023 16:00) (89 - 89)  RR: 18 (21 Sep 2023 11:59) (17 - 18)  SpO2: 100% (21 Sep 2023 11:59) (97% - 100%)    Parameters below as of 21 Sep 2023 11:59  Patient On (Oxygen Delivery Method): room air    PHYSICAL EXAM:  HEENT:  pupils equal and reactive, EOMI, no oropharyngeal lesions, erythema, exudates, oral thrush  NECK:   supple, no carotid bruits, no palpable lymph nodes, no thyromegaly  CV:  +S1, +S2, regular, no murmurs or rubs  RESP:   lungs decreased BS bilaterally with some mild crackles at the left base, no wheezing  BREAST:  not examined  GI:  abdomen soft, non-tender, non-distended, normal BS, no bruits, no abdominal masses, no palpable masses  RECTAL:  not examined  :  not examined  MSK:   normal muscle tone, no atrophy, no rigidity, no contractions  EXT:  Right shoulder swelling and wound with dressing, right arm in sling, moving fingers  VASCULAR:  pulses equal and symmetric in the upper and lower extremities  NEURO:  AAOX3, no focal neurological deficits, follows all commands, able to move extremities spontaneously  SKIN:  no ulcers, lesions or rashes    LABS:                        10.5   14.37 )-----------( 307      ( 21 Sep 2023 08:24 )             31.7     21 Sep 2023 08:24    138    |  109    |  19     ----------------------------<  132    4.6     |  25     |  1.00     Ca    8.9        21 Sep 2023 08:24    CAPILLARY BLOOD GLUCOSE  POCT Blood Glucose.: 126 mg/dL (21 Sep 2023 07:40)  POCT Blood Glucose.: 189 mg/dL (20 Sep 2023 21:44)    Urinalysis Basic - ( 21 Sep 2023 08:24 )  Color: x / Appearance: x / SG: x / pH: x  Gluc: 132 mg/dL / Ketone: x  / Bili: x / Urobili: x   Blood: x / Protein: x / Nitrite: x   Leuk Esterase: x / RBC: x / WBC x   Sq Epi: x / Non Sq Epi: x / Bacteria: x    MEDICATIONS  (STANDING):  acetaminophen     Tablet .. 1000 milliGRAM(s) Oral every 8 hours  allopurinol 300 milliGRAM(s) Oral daily  amLODIPine   Tablet 2.5 milliGRAM(s) Oral daily  aspirin 325 milliGRAM(s) Oral daily  influenza  Vaccine (HIGH DOSE) 0.7 milliLiter(s) IntraMuscular once  lactated ringers. 1000 milliLiter(s) (75 mL/Hr) IV Continuous <Continuous>  pantoprazole    Tablet 40 milliGRAM(s) Oral before breakfast  polyethylene glycol 3350 17 Gram(s) Oral at bedtime  senna 2 Tablet(s) Oral at bedtime  tranexamic acid IVPB 1000 milliGRAM(s) IV Intermittent once  tranexamic acid IVPB 1000 milliGRAM(s) IV Intermittent once    MEDICATIONS  (PRN):  HYDROmorphone  Injectable 0.5 milliGRAM(s) SubCutaneous every 4 hours PRN Severe Pain (7 - 10)  ondansetron Injectable 8 milliGRAM(s) IV Push every 8 hours PRN Nausea and/or Vomiting  oxyCODONE    IR 5 milliGRAM(s) Oral every 4 hours PRN Mild Pain (1 - 3)  oxyCODONE    IR 10 milliGRAM(s) Oral every 4 hours PRN Moderate Pain (4 - 6)  prochlorperazine   Injectable 10 milliGRAM(s) IV Push every 8 hours PRN Nausea and/or Vomiting    IMPRESSION:    S/P ELECTIVE RIGHT REVERSE SHOULDER REPLACEMENT FOR SEVERE OSTEOARTHRITIS  HYPERTENSION  HYPERLIPIDEMIA  Mild anemia acute blood loss postop      RECOMMENDATIONS:  Ortho following  Pain meds prn  Keep RUE in sling, NWB  HH slightly dropped, no need for transfusion today  Incentive spirometry  Bowel regimen    #DVT proph-per ortho team/ Aspirin daily    #Dispo- likely home today
   PCP:   Ronnell Barba  Ortho:  [facility]    CHIEF COMPLAINT:   right shoulder pain    HISTORY OF THE PRESENT ILLNESS:  87 M with Hx of long standing osteoarthritis of the right shoulder and pain.  He has tried conservative strategies for pain control including anti-inflammatories, pain meds and steroid injections.  They have only helped for the short-term.  He has experienced progressive pain and progressive limited ROM of the right arm and shoulder.  This has negatively impacted his ability to perform his activities of daily living.  He was evaluated by Dr. Macias and found to have severe glenohumeral arthritis.  He underwent a right total shoulder replacement today.  In the PACU, his vital signs have been stable.  The hospitalist service has been consulted to assist with post-op medical management.    9/21/23- up and ambulating in the hallway independently, denies pain  9/22/23- up and ambulating. No new complaints    PAST MEDICAL HISTORY:  HTN  Osteoarthritis of the right shoulder  Diverticulosis  Lumbar stenosis  Cervical stenosis  Nephrolithiasis  Hyperlipidemia  Depression  Gout    PAST SURGICAL HISTORY:  s/p left replacement  s/p bilateral knee replacements  s/p inguinal hernia repair  s/p eye surgery    FAMILY HISTORY:     Mother - Breast cancer    SOCIAL HISTORY:  no smoking, occasional alcohol, no drugs    REVIEW OF SYSTEMS:   All other systems reviewed in detailed and found to be negative with the exception of what has already been described above    Vital Signs Last 24 Hrs  T(C): 36.5 (22 Sep 2023 08:00), Max: 36.5 (21 Sep 2023 20:05)  T(F): 97.7 (22 Sep 2023 08:00), Max: 97.7 (21 Sep 2023 20:05)  HR: 57 (22 Sep 2023 08:00) (57 - 86)  BP: 116/72 (22 Sep 2023 08:00) (113/72 - 121/72)  BP(mean): --  RR: 18 (22 Sep 2023 08:00) (18 - 18)  SpO2: 99% (22 Sep 2023 08:00) (97% - 99%)    Parameters below as of 22 Sep 2023 08:00  Patient On (Oxygen Delivery Method): room air    PHYSICAL EXAM:  HEENT:  pupils equal and reactive, EOMI, no oropharyngeal lesions, erythema, exudates, oral thrush  NECK:   supple, no carotid bruits, no palpable lymph nodes, no thyromegaly  CV:  +S1, +S2, regular, no murmurs or rubs  RESP:   lungs decreased BS bilaterally with some mild crackles at the left base, no wheezing  BREAST:  not examined  GI:  abdomen soft, non-tender, non-distended, normal BS, no bruits, no abdominal masses, no palpable masses  RECTAL:  not examined  :  not examined  MSK:   normal muscle tone, no atrophy, no rigidity, no contractions  EXT:  Right shoulder swelling and wound with dressing, right arm in sling, moving fingers  VASCULAR:  pulses equal and symmetric in the upper and lower extremities  NEURO:  AAOX3, no focal neurological deficits, follows all commands, able to move extremities spontaneously  SKIN:  no ulcers, lesions or rashes    LABS:                                   9.4    9.28  )-----------( 278      ( 22 Sep 2023 07:13 )             29.3     22 Sep 2023 07:13    140    |  108    |  22     ----------------------------<  106    4.5     |  28     |  0.95     Ca    8.9        22 Sep 2023 07:13    Urinalysis Basic - ( 22 Sep 2023 07:13 )  Color: x / Appearance: x / SG: x / pH: x  Gluc: 106 mg/dL / Ketone: x  / Bili: x / Urobili: x   Blood: x / Protein: x / Nitrite: x   Leuk Esterase: x / RBC: x / WBC x   Sq Epi: x / Non Sq Epi: x / Bacteria: x    MEDICATIONS  (STANDING):  acetaminophen     Tablet .. 1000 milliGRAM(s) Oral every 8 hours  allopurinol 300 milliGRAM(s) Oral daily  amLODIPine   Tablet 2.5 milliGRAM(s) Oral daily  aspirin 325 milliGRAM(s) Oral daily  influenza  Vaccine (HIGH DOSE) 0.7 milliLiter(s) IntraMuscular once  lactated ringers. 1000 milliLiter(s) (75 mL/Hr) IV Continuous <Continuous>  pantoprazole    Tablet 40 milliGRAM(s) Oral before breakfast  polyethylene glycol 3350 17 Gram(s) Oral at bedtime  senna 2 Tablet(s) Oral at bedtime  tranexamic acid IVPB 1000 milliGRAM(s) IV Intermittent once  tranexamic acid IVPB 1000 milliGRAM(s) IV Intermittent once    MEDICATIONS  (PRN):  HYDROmorphone  Injectable 0.5 milliGRAM(s) SubCutaneous every 4 hours PRN Severe Pain (7 - 10)  ondansetron Injectable 8 milliGRAM(s) IV Push every 8 hours PRN Nausea and/or Vomiting  oxyCODONE    IR 5 milliGRAM(s) Oral every 4 hours PRN Mild Pain (1 - 3)  oxyCODONE    IR 10 milliGRAM(s) Oral every 4 hours PRN Moderate Pain (4 - 6)  prochlorperazine   Injectable 10 milliGRAM(s) IV Push every 8 hours PRN Nausea and/or Vomiting    IMPRESSION:    S/P ELECTIVE RIGHT REVERSE SHOULDER REPLACEMENT FOR SEVERE OSTEOARTHRITIS  HYPERTENSION  HYPERLIPIDEMIA  Mild anemia acute blood loss postop      RECOMMENDATIONS:  Ortho following  Pain meds prn  Keep RUE in sling, NWB  HH slightly dropped, no need for transfusion today  Incentive spirometry  Bowel regimen    #DVT proph- per ortho team/ Aspirin daily    #Dispo- medically stable for discharge
   PCP:   Ronnell Barba  Ortho:  [facility]    CHIEF COMPLAINT:   right shoulder pain    HISTORY OF THE PRESENT ILLNESS:  87 M with Hx of long standing osteoarthritis of the right shoulder and pain.  He has tried conservative strategies for pain control including anti-inflammatories, pain meds and steroid injections.  They have only helped for the short-term.  He has experienced progressive pain and progressive limited ROM of the right arm and shoulder.  This has negatively impacted his ability to perform his activities of daily living.  He was evaluated by Dr. Macias and found to have severe glenohumeral arthritis.  He underwent a right total shoulder replacement today.  In the PACU, his vital signs have been stable.  The hospitalist service has been consulted to assist with post-op medical management.    9/21/23- up and ambulating in the hallway independently, denies pain  9/22/23- up and ambulating. No new complaints  9/23/23- going to rehab today. Pain is controlled. Ambulating    PAST MEDICAL HISTORY:  HTN  Osteoarthritis of the right shoulder  Diverticulosis  Lumbar stenosis  Cervical stenosis  Nephrolithiasis  Hyperlipidemia  Depression  Gout    PAST SURGICAL HISTORY:  s/p left replacement  s/p bilateral knee replacements  s/p inguinal hernia repair  s/p eye surgery    FAMILY HISTORY:     Mother - Breast cancer    SOCIAL HISTORY:  no smoking, occasional alcohol, no drugs    REVIEW OF SYSTEMS:   All other systems reviewed in detailed and found to be negative with the exception of what has already been described above    Vital Signs Last 24 Hrs  T(C): 36.6 (23 Sep 2023 07:54), Max: 36.7 (22 Sep 2023 16:00)  T(F): 97.9 (23 Sep 2023 07:54), Max: 98.1 (22 Sep 2023 16:00)  HR: 90 (23 Sep 2023 07:54) (73 - 90)  BP: 118/85 (23 Sep 2023 07:54) (115/73 - 129/93)  BP(mean): --  RR: 18 (23 Sep 2023 07:54) (18 - 18)  SpO2: 94% (23 Sep 2023 07:54) (94% - 100%)    Parameters below as of 23 Sep 2023 07:54  Patient On (Oxygen Delivery Method): room air    PHYSICAL EXAM:  HEENT:  pupils equal and reactive, EOMI, no oropharyngeal lesions, erythema, exudates, oral thrush  NECK:   supple, no carotid bruits, no palpable lymph nodes, no thyromegaly  CV:  +S1, +S2, regular, no murmurs or rubs  RESP:   lungs decreased BS bilaterally with some mild crackles at the left base, no wheezing  BREAST:  not examined  GI:  abdomen soft, non-tender, non-distended, normal BS, no bruits, no abdominal masses, no palpable masses  RECTAL:  not examined  :  not examined  MSK:   normal muscle tone, no atrophy, no rigidity, no contractions  EXT:  Right shoulder swelling and wound with dressing, right arm in sling, moving fingers  VASCULAR:  pulses equal and symmetric in the upper and lower extremities  NEURO:  AAOX3, no focal neurological deficits, follows all commands, able to move extremities spontaneously  SKIN:  no ulcers, lesions or rashes    LABS:                                   11.0   7.56  )-----------( 367      ( 23 Sep 2023 07:50 )             34.7     23 Sep 2023 07:50    141    |  107    |  20     ----------------------------<  106    3.8     |  29     |  0.88     Ca    9.6        23 Sep 2023 07:50    Urinalysis Basic - ( 23 Sep 2023 07:50 )  Color: x / Appearance: x / SG: x / pH: x  Gluc: 106 mg/dL / Ketone: x  / Bili: x / Urobili: x   Blood: x / Protein: x / Nitrite: x   Leuk Esterase: x / RBC: x / WBC x   Sq Epi: x / Non Sq Epi: x / Bacteria: x      MEDICATIONS  (STANDING):  acetaminophen     Tablet .. 1000 milliGRAM(s) Oral every 8 hours  allopurinol 300 milliGRAM(s) Oral daily  amLODIPine   Tablet 2.5 milliGRAM(s) Oral daily  aspirin 325 milliGRAM(s) Oral daily  influenza  Vaccine (HIGH DOSE) 0.7 milliLiter(s) IntraMuscular once  lactated ringers. 1000 milliLiter(s) (75 mL/Hr) IV Continuous <Continuous>  pantoprazole    Tablet 40 milliGRAM(s) Oral before breakfast  polyethylene glycol 3350 17 Gram(s) Oral at bedtime  senna 2 Tablet(s) Oral at bedtime  tranexamic acid IVPB 1000 milliGRAM(s) IV Intermittent once  tranexamic acid IVPB 1000 milliGRAM(s) IV Intermittent once    MEDICATIONS  (PRN):  HYDROmorphone  Injectable 0.5 milliGRAM(s) SubCutaneous every 4 hours PRN Severe Pain (7 - 10)  ondansetron Injectable 8 milliGRAM(s) IV Push every 8 hours PRN Nausea and/or Vomiting  oxyCODONE    IR 5 milliGRAM(s) Oral every 4 hours PRN Mild Pain (1 - 3)  oxyCODONE    IR 10 milliGRAM(s) Oral every 4 hours PRN Moderate Pain (4 - 6)  prochlorperazine   Injectable 10 milliGRAM(s) IV Push every 8 hours PRN Nausea and/or Vomiting    IMPRESSION:  S/P ELECTIVE RIGHT REVERSE SHOULDER REPLACEMENT FOR SEVERE OSTEOARTHRITIS  HYPERTENSION  HYPERLIPIDEMIA  Mild anemia acute blood loss postop      RECOMMENDATIONS:  Ortho following  Pain meds prn  Keep RUE in sling, NWB  HH slightly dropped, no need for transfusion today  Incentive spirometry  Bowel regimen    #DVT proph- per ortho team/ Aspirin daily    #Dispo- medically stable for discharge, going to rehab today

## 2023-09-23 NOTE — ED PROVIDER NOTE - NS_EDPROVIDERDISPOUSERTYPE_ED_A_ED
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  Continue statin, aspirin.  MRI showed restricted diffusion in the MCA likely M3 territory peripheral left parietal region, as well as posterior aspect left insula, consistent with acute embolic infarcts.  Neurology, Dr. Ortega saw the patient  PT/OT evaluate and treat - no functional immobility noted  Speech Therapy - recs mechanical soft diet with thin liquids, therapy to continue (will need outpatient speech therapy as she has medicaid)  Check 2 D Echo - preserved ED, enlarged right atrium and indeterminate diastolic function - no patent foramen ovale  Bilateral carotid ultrasound - no stenosis  CTA of Head and Neck pending  Labs for hypercoagulability sent and pending         Scribe Attestation (For Scribes USE Only)... Attending Attestation (For Attendings USE Only).../Scribe Attestation (For Scribes USE Only)...

## 2023-09-23 NOTE — ED PROVIDER NOTE - CLINICAL SUMMARY MEDICAL DECISION MAKING FREE TEXT BOX
Pt presents for supposed agitation. Plan to call facility and reassess. Pt presents for supposed agitation. Plan to call facility and reassess. Originally cooperative but became increasingly agitated. Required medication for agitation. Patient a threat to himself and others.

## 2023-09-23 NOTE — ED ADULT TRIAGE NOTE - CHIEF COMPLAINT QUOTE
BIBEMS for agitation, pt discharged this morning from  ED, was taken back to [facility] where staff requested to send pt back to ER because of behavioral misconduct. pt arrived to ED calm and not exhibiting any signs of agitation at this time.

## 2023-09-23 NOTE — ED PROVIDER NOTE - PROGRESS NOTE DETAILS
Óscar: Patient threatening violence with staff. Took off his sling. Required medication to treat agitation for patient and staff safety. Placed on monitor.  Spoke with patient's daughter. patient has had intermittent episodes of agitation and violent outbursts. Family is scared he will hurt them. believe there is undiagnosed dementia. Patient unable to rationalize his decisions for me in the ED. Was supposed to go to rehab for his shoulder but they are unable to accept violent [patient. Patient will be held overnight for SW placement at a new facility.

## 2023-09-23 NOTE — ED PROVIDER NOTE - OBJECTIVE STATEMENT
88 y/o male w/ a PMHx of gout, hematuria, HTN, renal stones, OA, and skin CA presents to the ED via EMS for agitation. Pt was d/c from  ED his morning and was talking back to [facility] where staff requested to send pt back to the ED d/t behavioral misconduct. Pt calm and cooperative in ED. Pt denies having any acting out episodes. No other complaints at this time. 86 y/o male w/ a PMHx of gout, hematuria, HTN, renal stones, OA, and skin CA presents to the ED via EMS for agitation. Pt was d/c from  ED his morning and was talking back to [facility] where staff requested to send pt back to the ED d/t behavioral misconduct. Pt calm and cooperative in ED. Pt denies having any acting out episodes. No other complaints at this time.    Per facility, pt was throwing things at facility and was becoming hostile at the staff and other members, witnessed by staff supervisor and pt daughter. Facility also reports that pt had his right arm sling fully off at one point.

## 2023-09-24 DIAGNOSIS — F05 DELIRIUM DUE TO KNOWN PHYSIOLOGICAL CONDITION: ICD-10-CM

## 2023-09-24 DIAGNOSIS — R45.1 RESTLESSNESS AND AGITATION: ICD-10-CM

## 2023-09-24 PROBLEM — M19.011 PRIMARY OSTEOARTHRITIS, RIGHT SHOULDER: Chronic | Status: ACTIVE | Noted: 2023-09-14

## 2023-09-24 PROBLEM — M10.9 GOUT, UNSPECIFIED: Chronic | Status: ACTIVE | Noted: 2023-09-14

## 2023-09-24 PROBLEM — C44.90 UNSPECIFIED MALIGNANT NEOPLASM OF SKIN, UNSPECIFIED: Chronic | Status: ACTIVE | Noted: 2023-09-14

## 2023-09-24 PROBLEM — I10 ESSENTIAL (PRIMARY) HYPERTENSION: Chronic | Status: ACTIVE | Noted: 2023-09-14

## 2023-09-24 PROBLEM — N20.0 CALCULUS OF KIDNEY: Chronic | Status: ACTIVE | Noted: 2023-09-14

## 2023-09-24 PROBLEM — R31.9 HEMATURIA, UNSPECIFIED: Chronic | Status: ACTIVE | Noted: 2023-09-14

## 2023-09-24 LAB
CULTURE RESULTS: NO GROWTH — SIGNIFICANT CHANGE UP
SPECIMEN SOURCE: SIGNIFICANT CHANGE UP

## 2023-09-24 PROCEDURE — 93005 ELECTROCARDIOGRAM TRACING: CPT

## 2023-09-24 PROCEDURE — 82607 VITAMIN B-12: CPT

## 2023-09-24 PROCEDURE — 85027 COMPLETE CBC AUTOMATED: CPT

## 2023-09-24 PROCEDURE — 73120 X-RAY EXAM OF HAND: CPT | Mod: LT

## 2023-09-24 PROCEDURE — 70551 MRI BRAIN STEM W/O DYE: CPT

## 2023-09-24 PROCEDURE — 85652 RBC SED RATE AUTOMATED: CPT

## 2023-09-24 PROCEDURE — 99222 1ST HOSP IP/OBS MODERATE 55: CPT

## 2023-09-24 PROCEDURE — 86140 C-REACTIVE PROTEIN: CPT

## 2023-09-24 PROCEDURE — 36415 COLL VENOUS BLD VENIPUNCTURE: CPT

## 2023-09-24 PROCEDURE — 84443 ASSAY THYROID STIM HORMONE: CPT

## 2023-09-24 PROCEDURE — 93010 ELECTROCARDIOGRAM REPORT: CPT

## 2023-09-24 PROCEDURE — 90792 PSYCH DIAG EVAL W/MED SRVCS: CPT

## 2023-09-24 PROCEDURE — 86431 RHEUMATOID FACTOR QUANT: CPT

## 2023-09-24 PROCEDURE — 84550 ASSAY OF BLOOD/URIC ACID: CPT

## 2023-09-24 PROCEDURE — 80048 BASIC METABOLIC PNL TOTAL CA: CPT

## 2023-09-24 RX ORDER — ONDANSETRON 8 MG/1
4 TABLET, FILM COATED ORAL EVERY 6 HOURS
Refills: 0 | Status: DISCONTINUED | OUTPATIENT
Start: 2023-09-24 | End: 2023-10-05

## 2023-09-24 RX ORDER — LANOLIN ALCOHOL/MO/W.PET/CERES
3 CREAM (GRAM) TOPICAL AT BEDTIME
Refills: 0 | Status: DISCONTINUED | OUTPATIENT
Start: 2023-09-24 | End: 2023-10-05

## 2023-09-24 RX ORDER — ASPIRIN/CALCIUM CARB/MAGNESIUM 324 MG
81 TABLET ORAL DAILY
Refills: 0 | Status: DISCONTINUED | OUTPATIENT
Start: 2023-09-24 | End: 2023-10-05

## 2023-09-24 RX ORDER — ACETAMINOPHEN 500 MG
650 TABLET ORAL EVERY 6 HOURS
Refills: 0 | Status: DISCONTINUED | OUTPATIENT
Start: 2023-09-24 | End: 2023-10-05

## 2023-09-24 RX ORDER — HALOPERIDOL DECANOATE 100 MG/ML
1 INJECTION INTRAMUSCULAR EVERY 8 HOURS
Refills: 0 | Status: DISCONTINUED | OUTPATIENT
Start: 2023-09-24 | End: 2023-09-29

## 2023-09-24 RX ORDER — QUETIAPINE FUMARATE 200 MG/1
25 TABLET, FILM COATED ORAL ONCE
Refills: 0 | Status: COMPLETED | OUTPATIENT
Start: 2023-09-24 | End: 2023-09-24

## 2023-09-24 RX ORDER — AMLODIPINE BESYLATE 2.5 MG/1
2.5 TABLET ORAL DAILY
Refills: 0 | Status: DISCONTINUED | OUTPATIENT
Start: 2023-09-24 | End: 2023-10-05

## 2023-09-24 RX ORDER — ALLOPURINOL 300 MG
300 TABLET ORAL DAILY
Refills: 0 | Status: DISCONTINUED | OUTPATIENT
Start: 2023-09-24 | End: 2023-10-04

## 2023-09-24 RX ORDER — RISPERIDONE 4 MG/1
0.25 TABLET ORAL
Refills: 0 | Status: DISCONTINUED | OUTPATIENT
Start: 2023-09-24 | End: 2023-10-05

## 2023-09-24 RX ORDER — INFLUENZA VIRUS VACCINE 15; 15; 15; 15 UG/.5ML; UG/.5ML; UG/.5ML; UG/.5ML
0.7 SUSPENSION INTRAMUSCULAR ONCE
Refills: 0 | Status: DISCONTINUED | OUTPATIENT
Start: 2023-09-24 | End: 2023-10-05

## 2023-09-24 RX ADMIN — Medication 3 MILLIGRAM(S): at 21:02

## 2023-09-24 RX ADMIN — HALOPERIDOL DECANOATE 1 MILLIGRAM(S): 100 INJECTION INTRAMUSCULAR at 12:31

## 2023-09-24 RX ADMIN — RISPERIDONE 0.25 MILLIGRAM(S): 4 TABLET ORAL at 21:02

## 2023-09-24 RX ADMIN — Medication 1 MILLIGRAM(S): at 13:18

## 2023-09-24 RX ADMIN — Medication 2 MILLIGRAM(S): at 04:45

## 2023-09-24 RX ADMIN — QUETIAPINE FUMARATE 25 MILLIGRAM(S): 200 TABLET, FILM COATED ORAL at 09:27

## 2023-09-24 NOTE — H&P ADULT - ASSESSMENT
* Toxic metab encephalopathy  suspect baseline dementia based on CT findings  I will consult Neuro for input  Seroquel 25 qd and prn Haldol    * Recent right shoulder surgery  avoid narcs they maybe responsible for his MS change    * HTN resume Norvasc    daughter Peg [phone]  son [phone]   * Toxic metab encephalopathy  suspect baseline dementia based on CT findings and as per daughter this clear worsening dementia   I will consult Neuro for input  Seroquel 25 qd and prn Haldol    * Recent right shoulder surgery  avoid narcs they maybe responsible for his MS change    * HTN resume Norvasc    daughter Peg [phone]  son [phone]    needs placement most likely

## 2023-09-24 NOTE — BH CONSULTATION LIAISON ASSESSMENT NOTE - NSBHCHARTREVIEWLAB_PSY_A_CORE FT
12.3   12.46 )-----------( 495      ( 23 Sep 2023 18:08 )             37.0   09-23    140  |  107  |  20  ----------------------------<  123<H>  4.2   |  26  |  1.13    Ca    9.9      23 Sep 2023 18:08    TPro  7.7  /  Alb  3.2<L>  /  TBili  0.5  /  DBili  x   /  AST  26  /  ALT  35  /  AlkPhos  131<H>  09-23

## 2023-09-24 NOTE — BH CONSULTATION LIAISON ASSESSMENT NOTE - NSBHCHARTREVIEWVS_PSY_A_CORE FT
Vital Signs Last 24 Hrs  T(C): 36.5 (24 Sep 2023 08:53), Max: 36.9 (23 Sep 2023 19:52)  T(F): 97.7 (24 Sep 2023 08:53), Max: 98.4 (23 Sep 2023 19:52)  HR: 95 (24 Sep 2023 08:53) (75 - 95)  BP: 117/78 (24 Sep 2023 08:53) (117/78 - 158/62)  BP(mean): 110 (24 Sep 2023 06:39) (110 - 110)  RR: 18 (24 Sep 2023 08:53) (16 - 18)  SpO2: 99% (24 Sep 2023 08:53) (98% - 99%)    Parameters below as of 24 Sep 2023 08:53  Patient On (Oxygen Delivery Method): room air

## 2023-09-24 NOTE — PATIENT PROFILE ADULT - FUNCTIONAL ASSESSMENT - DAILY ACTIVITY 6.
Follow the DASH Diet: Dietary Approaches to Stop Hypertension) is a dietary pattern promoted by the [place].-based National Heart[facility] [employer] [place] [part of the National Institutes of Health ("NIH"), an agency of the United [place] Department of Health and Human Services] to prevent and control hypertension. The DASH diet is rich in fruits, vegetables, whole grains, and low-fat dairy foods; includes meat, fish, poultry, nuts, and beans; and is limited in sugar-sweetened foods and beverages, red meat, and added fats. In addition to its effect on blood pressure, it is designed to be a well-balanced approach to eating for the general public. DASH is recommended by the [place] Department of Agriculture ("USDA") as one of its ideal eating plans for all Americans. 4 = No assist / stand by assistance

## 2023-09-24 NOTE — H&P ADULT - NSHPPHYSICALEXAM_GEN_ALL_CORE
Vital Signs Last 24 Hrs  T(C): 36.5 (24 Sep 2023 08:53), Max: 36.9 (23 Sep 2023 19:52)  T(F): 97.7 (24 Sep 2023 08:53), Max: 98.4 (23 Sep 2023 19:52)  HR: 95 (24 Sep 2023 08:53) (75 - 95)  BP: 117/78 (24 Sep 2023 08:53) (117/78 - 158/62)  BP(mean): 110 (24 Sep 2023 06:39) (110 - 110)  RR: 18 (24 Sep 2023 08:53) (16 - 18)  SpO2: 99% (24 Sep 2023 08:53) (98% - 99%)    Parameters below as of 24 Sep 2023 08:53  Patient On (Oxygen Delivery Method): room air    PHYSICAL EXAM:      Constitutional: NAD  Eyes: perrl, no conjunctival changes  ENMT: no exudates, moist oral muc, uvula midline  Neck: no JVD, no LAD  Back: no cva tenderness  Respiratory: CTA, no exp wheezes  Cardiovascular: S1S2 reg, no murmur gallop or rub  Gastrointestinal: abd soft, NT/ND + BS  Genitourinary: voiding  Extremities: right arm in sling scar is dry  Vascular: pedal pulses + bilateral, warm extremities  Neurological: non focal, mot str 5/5/ all extr  Skin: no rashes  Lymph Nodes: no LAD

## 2023-09-24 NOTE — BH CONSULTATION LIAISON ASSESSMENT NOTE - CURRENT MEDICATION
MEDICATIONS  (STANDING):  amLODIPine   Tablet 2.5 milliGRAM(s) Oral daily  influenza  Vaccine (HIGH DOSE) 0.7 milliLiter(s) IntraMuscular once  risperiDONE   Tablet 0.25 milliGRAM(s) Oral two times a day    MEDICATIONS  (PRN):  acetaminophen     Tablet .. 650 milliGRAM(s) Oral every 6 hours PRN Mild Pain (1 - 3)  aluminum hydroxide/magnesium hydroxide/simethicone Suspension 30 milliLiter(s) Oral every 4 hours PRN Dyspepsia  haloperidol    Injectable 1 milliGRAM(s) IntraMuscular every 8 hours PRN Agitation  LORazepam   Injectable 1 milliGRAM(s) IV Push every 4 hours PRN Agitation  melatonin 3 milliGRAM(s) Oral at bedtime PRN Insomnia  ondansetron Injectable 4 milliGRAM(s) IV Push every 6 hours PRN Nausea and/or Vomiting

## 2023-09-24 NOTE — BH CONSULTATION LIAISON ASSESSMENT NOTE - NSBHCONSULTFOLLOWAFTERCARE_PSY_A_CORE FT
Patient can resume treatment for dementia and behavioral services once delirium resolves.  Daughter is asking for geriatric psychiatrist in community.   to make referral.

## 2023-09-24 NOTE — H&P ADULT - NSHPLABSRESULTS_GEN_ALL_CORE
12.3   12.46 )-----------( 495      ( 23 Sep 2023 18:08 )             37.0   09-23    140  |  107  |  20  ----------------------------<  123<H>  4.2   |  26  |  1.13    Ca    9.9      23 Sep 2023 18:08    TPro  7.7  /  Alb  3.2<L>  /  TBili  0.5  /  DBili  x   /  AST  26  /  ALT  35  /  AlkPhos  131<H>  09-23    < from: CT Head No Cont (09.23.23 @ 20:23) >    IMPRESSION: No intracranial hemorrhage or acute transcortical infarct.   Generalized volume loss with small vessel ischemic disease.

## 2023-09-24 NOTE — BH CONSULTATION LIAISON ASSESSMENT NOTE - SUMMARY
Patient is an 87-year-old man with history of dementia, undiagnosed, who was admitted with change in mental status.  He became agitated and code gray had to be called twice today.    Patient had to be medicated and during the interview he is sedated however he opens the eyes and answer the questions.  He denies thoughts about harming himself or anybody else.  Denies hearing voices.  He is not agitated during the interview.  He is not able to say you the date and think it is 2013, he did not say where he is he thinks he is in his home but he is able to say his age.    Most collateral information is obtained from his daughter Peg who states that patient is suffering memory problems for a while but never went to a doctor.  She states the patient misplaces objects and cannot find them.  He called police because he thinks somebody wrote him and that there is a man in the backyard.  He still has July of his  wife that he tries to cover up because he thinks his family is stealing from him and then he forgets.  He called police claiming that somebody stole.  As per daughter patient is very stubborn and uncooperative.  He will not listen would advise him and ask him to do.  As his personality trait but is not exaggerated.  Patient lives alone and looks his house does not anybody come in.    As per daughter's description patient presents with cognitive impairment with paranoid thinking and visual hallucinations.  He is responding to dose stimuli and called police.    Currently presents in delirium with rule out dementia.    Plan:    We will start Risperdal 0.25 mg 2 times a day to address psychosis and agitation.    In case of severe agitation may use Haldol 1 mg IM every 6 hours as needed.    Psychiatry  will follow-up

## 2023-09-24 NOTE — BH CONSULTATION LIAISON ASSESSMENT NOTE - HPI (INCLUDE ILLNESS QUALITY, SEVERITY, DURATION, TIMING, CONTEXT, MODIFYING FACTORS, ASSOCIATED SIGNS AND SYMPTOMS)
Patient is an 87-year-old man with history of dementia, undiagnosed, who was admitted with change in mental status.  He became agitated and code gray had to be called twice today.    Patient had to be medicated and during the interview he is sedated however he opens the eyes and answer the questions.  He denies thoughts about harming himself or anybody else.  Denies hearing voices.  He is not agitated during the interview.  He is not able to say you the date and think it is 2013, he did not say where he is he thinks he is in his home but he is able to say his age.    Most collateral information is obtained from his daughter Peg who states that patient is suffering memory problems for a while but never went to a doctor.  She states the patient misplaces objects and cannot find them.  He called police because he thinks somebody wrote him and that there is a man in the backyard.  He still has July of his  wife that he tries to cover up because he thinks his family is stealing from him and then he forgets.  He called police claiming that somebody stole.  As per daughter patient is very stubborn and uncooperative.  He will not listen would advise him and ask him to do.  As his personality trait but is not exaggerated.  Patient lives alone and looks his house does not anybody come in.

## 2023-09-24 NOTE — PATIENT PROFILE ADULT - FALL HARM RISK - HARM RISK INTERVENTIONS
Assistance with ambulation/Assistance OOB with selected safe patient handling equipment/Communicate Risk of Fall with Harm to all staff/Discuss with provider need for PT consult/Monitor gait and stability/Provide patient with walking aids - walker, cane, crutches/Reinforce activity limits and safety measures with patient and family/Tailored Fall Risk Interventions/Use of alarms - bed, chair and/or voice tab/Visual Cue: Yellow wristband and red socks/Bed in lowest position, wheels locked, appropriate side rails in place/Call bell, personal items and telephone in reach/Instruct patient to call for assistance before getting out of bed or chair/Non-slip footwear when patient is out of bed/[place] to call system/Physically safe environment - no spills, clutter or unnecessary equipment/Purposeful Proactive Rounding/Room/bathroom lighting operational, light cord in reach

## 2023-09-24 NOTE — H&P ADULT - HISTORY OF PRESENT ILLNESS
88 y/o male w/ a PMHx of gout, hematuria, HTN, renal stones, OA, and skin CA presents to the ED via EMS for agitation. Pt was d/c from  ED his morning and was talking back to [facility] where staff requested to send pt back to the ED d/t behavioral misconduct. Pt calm and cooperative in ED. Pt denies having any acting out episodes. No other complaints at this time. Per facility, pt was throwing things at facility and was becoming hostile at the staff and other members, witnessed by staff supervisor and pt daughter. Facility also reports that pt had his right arm sling fully off at one point. Pt seen in 5 s, tried to leave, easily oriented, can caount 10-1 but unable to count MOYB; s/p total of 3 mg Ativan in ED.  Adm for sudden change MS, combative now resolved. Non focal exam. He was recently hospitalized for right shoulder ortho intervention.   86 y/o male w/ a PMHx of gout, hematuria, HTN, renal stones, OA, and skin CA presents to the ED via EMS for agitation. Pt was d/c from  ED his morning and was talking back to [facility] where staff requested to send pt back to the ED d/t behavioral misconduct. Pt calm and cooperative in ED. Pt denies having any acting out episodes. No other complaints at this time. Per facility, pt was throwing things at facility and was becoming hostile at the staff and other members, witnessed by staff supervisor and pt daughter. Facility also reports that pt had his right arm sling fully off at one point. Pt seen in 5 s, tried to leave, easily oriented, can count 10-1 but unable to count MOYB; s/p total of 3 mg Ativan in ED.  Adm for sudden change MS, combative now resolved. Non focal exam. He was recently hospitalized for right shoulder ortho intervention.  Case d/w daughter he has signs of dementia for years; misplaced things, forgetful, unable to organize home, food, v disorganized can be violent

## 2023-09-24 NOTE — BH CONSULTATION LIAISON ASSESSMENT NOTE - RISK ASSESSMENT
Low acute risk: Patient is not suicidal but he gets agitated.    Somewhat increased long-term risk considering patient dementia and psychosis.    Protective factors: Supportive family.

## 2023-09-25 LAB
ANION GAP SERPL CALC-SCNC: 5 MMOL/L — SIGNIFICANT CHANGE UP (ref 5–17)
BUN SERPL-MCNC: 14 MG/DL — SIGNIFICANT CHANGE UP (ref 7–23)
CALCIUM SERPL-MCNC: 9.3 MG/DL — SIGNIFICANT CHANGE UP (ref 8.5–10.1)
CHLORIDE SERPL-SCNC: 111 MMOL/L — HIGH (ref 96–108)
CO2 SERPL-SCNC: 28 MMOL/L — SIGNIFICANT CHANGE UP (ref 22–31)
CREAT SERPL-MCNC: 0.73 MG/DL — SIGNIFICANT CHANGE UP (ref 0.5–1.3)
EGFR: 89 ML/MIN/1.73M2 — SIGNIFICANT CHANGE UP
GLUCOSE SERPL-MCNC: 98 MG/DL — SIGNIFICANT CHANGE UP (ref 70–99)
HCT VFR BLD CALC: 34.3 % — LOW (ref 39–50)
HGB BLD-MCNC: 11.1 G/DL — LOW (ref 13–17)
MCHC RBC-ENTMCNC: 29.8 PG — SIGNIFICANT CHANGE UP (ref 27–34)
MCHC RBC-ENTMCNC: 32.4 GM/DL — SIGNIFICANT CHANGE UP (ref 32–36)
MCV RBC AUTO: 92 FL — SIGNIFICANT CHANGE UP (ref 80–100)
PLATELET # BLD AUTO: 305 K/UL — SIGNIFICANT CHANGE UP (ref 150–400)
POTASSIUM SERPL-MCNC: 3.9 MMOL/L — SIGNIFICANT CHANGE UP (ref 3.5–5.3)
POTASSIUM SERPL-SCNC: 3.9 MMOL/L — SIGNIFICANT CHANGE UP (ref 3.5–5.3)
RBC # BLD: 3.73 M/UL — LOW (ref 4.2–5.8)
RBC # FLD: 14.7 % — HIGH (ref 10.3–14.5)
SODIUM SERPL-SCNC: 144 MMOL/L — SIGNIFICANT CHANGE UP (ref 135–145)
WBC # BLD: 6.95 K/UL — SIGNIFICANT CHANGE UP (ref 3.8–10.5)
WBC # FLD AUTO: 6.95 K/UL — SIGNIFICANT CHANGE UP (ref 3.8–10.5)

## 2023-09-25 PROCEDURE — 99232 SBSQ HOSP IP/OBS MODERATE 35: CPT

## 2023-09-25 RX ADMIN — Medication 300 MILLIGRAM(S): at 08:55

## 2023-09-25 RX ADMIN — RISPERIDONE 0.25 MILLIGRAM(S): 4 TABLET ORAL at 08:55

## 2023-09-25 RX ADMIN — Medication 650 MILLIGRAM(S): at 21:34

## 2023-09-25 RX ADMIN — Medication 81 MILLIGRAM(S): at 08:55

## 2023-09-25 RX ADMIN — Medication 650 MILLIGRAM(S): at 21:04

## 2023-09-25 RX ADMIN — AMLODIPINE BESYLATE 2.5 MILLIGRAM(S): 2.5 TABLET ORAL at 08:55

## 2023-09-25 RX ADMIN — RISPERIDONE 0.25 MILLIGRAM(S): 4 TABLET ORAL at 21:03

## 2023-09-25 NOTE — PROGRESS NOTE ADULT - SUBJECTIVE AND OBJECTIVE BOX
86 y/o male w/ a PMHx of gout, hematuria, HTN, renal stones, OA, and skin CA presents to the ED via EMS for agitation. Pt was d/c from  ED his morning and was talking back to [facility] where staff requested to send pt back to the ED d/t behavioral misconduct. Pt calm and cooperative in ED. Pt denies having any acting out episodes. No other complaints at this time. Per facility, pt was throwing things at facility and was becoming hostile at the staff and other members, witnessed by staff supervisor and pt daughter. Facility also reports that pt had his right arm sling fully off at one point.  Adm for sudden change MS, combative now resolved. Non focal exam. He was recently hospitalized for right shoulder ortho intervention.      9/25- patient was seen and examined- appeared calm - oriented x3- on constant observation.     Vital Signs Last 24 Hrs  T(C): 37.1 (25 Sep 2023 08:11), Max: 37.1 (25 Sep 2023 08:11)  T(F): 98.8 (25 Sep 2023 08:11), Max: 98.8 (25 Sep 2023 08:11)  HR: 84 (25 Sep 2023 08:11) (84 - 86)  BP: 139/73 (25 Sep 2023 08:11) (139/73 - 141/75)  BP(mean): --  RR: 18 (25 Sep 2023 08:11) (18 - 18)  SpO2: 97% (25 Sep 2023 08:11) (95% - 97%)    Parameters below as of 25 Sep 2023 08:11  Patient On (Oxygen Delivery Method): room air    ROS:   All 10 systems reviewed and found to be negative with the exception of what has been described above.     PE:  Constitutional: NAD, laying in bed  HEENT: NC/AT  Back: no tenderness  Respiratory: respirations even and non labored, LCTA  Cardiovascular: S1S2 regular, no murmurs  Abdomen: soft, not tender, not distended, positive BS  Genitourinary: voiding  Rectal: deferred  Musculoskeletal: no muscle tenderness, no joint swelling or tenderness  Extremities: no pedal edema   Neurological: no focal deficits    MEDICATIONS  (STANDING):  allopurinol 300 milliGRAM(s) Oral daily  amLODIPine   Tablet 2.5 milliGRAM(s) Oral daily  aspirin enteric coated 81 milliGRAM(s) Oral daily  influenza  Vaccine (HIGH DOSE) 0.7 milliLiter(s) IntraMuscular once  risperiDONE   Tablet 0.25 milliGRAM(s) Oral two times a day    MEDICATIONS  (PRN):  acetaminophen     Tablet .. 650 milliGRAM(s) Oral every 6 hours PRN Mild Pain (1 - 3)  aluminum hydroxide/magnesium hydroxide/simethicone Suspension 30 milliLiter(s) Oral every 4 hours PRN Dyspepsia  haloperidol    Injectable 1 milliGRAM(s) IntraMuscular every 8 hours PRN Agitation  LORazepam   Injectable 1 milliGRAM(s) IV Push every 4 hours PRN Agitation  melatonin 3 milliGRAM(s) Oral at bedtime PRN Insomnia  ondansetron Injectable 4 milliGRAM(s) IV Push every 6 hours PRN Nausea and/or Vomiting      09-25    144  |  111<H>  |  14  ----------------------------<  98  3.9   |  28  |  0.73    Ca    9.3      25 Sep 2023 07:21    TPro  7.7  /  Alb  3.2<L>  /  TBili  0.5  /  DBili  x   /  AST  26  /  ALT  35  /  AlkPhos  131<H>  09-23                            11.1   6.95  )-----------( 305      ( 25 Sep 2023 07:21 )             34.3       < from: CT Head No Cont (09.23.23 @ 20:23) >   CT BRAIN   ORDERED BY: WAYNE DE LEON     PROCEDURE DATE:  09/23/2023          INTERPRETATION:  PROCEDURE: CT head without contrast.    INDICATION: Altered mental status    TECHNIQUE: Serial axial sections were obtained at 5 mm intervals.   Sagittal and coronal reformatted images were obtained from the axial data   set. The images were reviewed in brain and bone windows.    COMPARISON: None    FINDINGS: The CT examination demonstrates generalized volume loss. There   is no midline shift or extra axial collections. The gray white   differentiation appears within normal limits. There is no intracranial   hemorrhage or acute transcortical infarct. There are patchy areas of   hypodensity within the periventricular white matter which may represent   the sequela of small vessel ischemic disease.    The bony windows demonstrates no fractures. The lenses are absent   secondary to prior cataract surgery. The visualized paranasal sinuses are   within normal limits. The mastoid air cells are well aerated.    IMPRESSION: No intracranial hemorrhage or acute transcortical infarct.   Generalized volume loss with small vessel ischemic disease.    < end of copied text >

## 2023-09-25 NOTE — PHARMACOTHERAPY INTERVENTION NOTE - COMMENTS
Medication history complete, reviewed medication with patient and nurse at bedside confirmed with doctor first med hx.

## 2023-09-25 NOTE — BH CONSULTATION LIAISON PROGRESS NOTE - NSBHASSESSMENTFT_PSY_ALL_CORE
Patient is an 87-year-old man with history of dementia, undiagnosed, who was admitted with change in mental status.  He became agitated and code gray had to be called twice today.  Patient had to be medicated and during the interview he is sedated however he opens the eyes and answer the questions.  He denies thoughts about harming himself or anybody else.  Denies hearing voices.  He is not agitated during the interview.  He is not able to say you the date and think it is 2013, he did not say where he is he thinks he is in his home but he is able to say his age.  Most collateral information is obtained from his daughter Peg who states that patient is suffering memory problems for a while but never went to a doctor.  She states the patient misplaces objects and cannot find them.  He called police because he thinks somebody wrote him and that there is a man in the backyard.  He still has July of his  wife that he tries to cover up because he thinks his family is stealing from him and then he forgets.  He called police claiming that somebody stole.  As per daughter patient is very stubborn and uncooperative.  He will not listen would advise him and ask him to do.  As his personality trait but is not exaggerated.  Patient lives alone and locks his house so that nobody can come in.    Suggested Risperdal and started 0.25mg po BID.   PT is tolerating well, no side effects.

## 2023-09-25 NOTE — BH CONSULTATION LIAISON PROGRESS NOTE - NSBHCHARTREVIEWVS_PSY_A_CORE FT
Vital Signs Last 24 Hrs  T(C): 37.1 (25 Sep 2023 08:11), Max: 37.1 (25 Sep 2023 08:11)  T(F): 98.8 (25 Sep 2023 08:11), Max: 98.8 (25 Sep 2023 08:11)  HR: 84 (25 Sep 2023 08:11) (84 - 86)  BP: 139/73 (25 Sep 2023 08:11) (139/73 - 141/75)  BP(mean): --  RR: 18 (25 Sep 2023 08:11) (18 - 18)  SpO2: 97% (25 Sep 2023 08:11) (95% - 97%)    Parameters below as of 25 Sep 2023 08:11  Patient On (Oxygen Delivery Method): room air

## 2023-09-25 NOTE — BH CONSULTATION LIAISON PROGRESS NOTE - NSBHFUPINTERVALHXFT_PSY_A_CORE
Patient is sitting in his bed and trying to pull underwear from back.  He seems to be having difficulty with that.  But he tolerates frustration well well.  He does make eye contact but he is engages in conversation.  Patient denies anger, depression, anxiety.  He denies thoughts of this trying to harm him.  He does have memory problems and he cannot recall today's date.  He is able to say his 2023.  Tolerating Risperdal very well and reporting no side effects

## 2023-09-25 NOTE — PROGRESS NOTE ADULT - ASSESSMENT
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 Patient was seen by Psychiatrist as an outpatient and was prescribed psychotropic meds- but patient only took one dose and did not comply. She feels that patient has been more depressed since the passing of his spouse 2 years ago.   son [phone]    Case d/w team on IDR.

## 2023-09-25 NOTE — BH CONSULTATION LIAISON PROGRESS NOTE - NSBHCHARTREVIEWINVESTIGATE_PSY_A_CORE FT
Ventricular Rate 92 BPM    Atrial Rate 92 BPM    P-R Interval 144 ms    QRS Duration 94 ms    Q-T Interval 382 ms    QTC Calculation(Bazett) 472 ms    P Axis 44 degrees    R Axis 7 degrees    T Axis 48 degrees    Diagnosis Line Sinus rhythm with Premature atrial complexes with Aberrant conduction  Otherwise normal ECG  When compared with ECG of 14-SEP-2023 16:08,  Premature ventricular complexes are no longer Present  Aberrant conduction is now Present  Confirmed by Palla MD, Franklyn ([id number]) on9/24/2023 3:18:31 PM

## 2023-09-25 NOTE — BH CONSULTATION LIAISON PROGRESS NOTE - CURRENT MEDICATION
MEDICATIONS  (STANDING):  allopurinol 300 milliGRAM(s) Oral daily  amLODIPine   Tablet 2.5 milliGRAM(s) Oral daily  aspirin enteric coated 81 milliGRAM(s) Oral daily  influenza  Vaccine (HIGH DOSE) 0.7 milliLiter(s) IntraMuscular once  risperiDONE   Tablet 0.25 milliGRAM(s) Oral two times a day    MEDICATIONS  (PRN):  acetaminophen     Tablet .. 650 milliGRAM(s) Oral every 6 hours PRN Mild Pain (1 - 3)  aluminum hydroxide/magnesium hydroxide/simethicone Suspension 30 milliLiter(s) Oral every 4 hours PRN Dyspepsia  haloperidol    Injectable 1 milliGRAM(s) IntraMuscular every 8 hours PRN Agitation  LORazepam   Injectable 1 milliGRAM(s) IV Push every 4 hours PRN Agitation  melatonin 3 milliGRAM(s) Oral at bedtime PRN Insomnia  ondansetron Injectable 4 milliGRAM(s) IV Push every 6 hours PRN Nausea and/or Vomiting

## 2023-09-26 LAB
ANION GAP SERPL CALC-SCNC: 4 MMOL/L — LOW (ref 5–17)
BUN SERPL-MCNC: 19 MG/DL — SIGNIFICANT CHANGE UP (ref 7–23)
CALCIUM SERPL-MCNC: 8.8 MG/DL — SIGNIFICANT CHANGE UP (ref 8.5–10.1)
CHLORIDE SERPL-SCNC: 107 MMOL/L — SIGNIFICANT CHANGE UP (ref 96–108)
CO2 SERPL-SCNC: 28 MMOL/L — SIGNIFICANT CHANGE UP (ref 22–31)
CREAT SERPL-MCNC: 0.89 MG/DL — SIGNIFICANT CHANGE UP (ref 0.5–1.3)
EGFR: 83 ML/MIN/1.73M2 — SIGNIFICANT CHANGE UP
GLUCOSE SERPL-MCNC: 121 MG/DL — HIGH (ref 70–99)
HCT VFR BLD CALC: 32.9 % — LOW (ref 39–50)
HGB BLD-MCNC: 10.7 G/DL — LOW (ref 13–17)
MCHC RBC-ENTMCNC: 29.9 PG — SIGNIFICANT CHANGE UP (ref 27–34)
MCHC RBC-ENTMCNC: 32.5 GM/DL — SIGNIFICANT CHANGE UP (ref 32–36)
MCV RBC AUTO: 91.9 FL — SIGNIFICANT CHANGE UP (ref 80–100)
PLATELET # BLD AUTO: 308 K/UL — SIGNIFICANT CHANGE UP (ref 150–400)
POTASSIUM SERPL-MCNC: 3.6 MMOL/L — SIGNIFICANT CHANGE UP (ref 3.5–5.3)
POTASSIUM SERPL-SCNC: 3.6 MMOL/L — SIGNIFICANT CHANGE UP (ref 3.5–5.3)
RBC # BLD: 3.58 M/UL — LOW (ref 4.2–5.8)
RBC # FLD: 14.6 % — HIGH (ref 10.3–14.5)
SODIUM SERPL-SCNC: 139 MMOL/L — SIGNIFICANT CHANGE UP (ref 135–145)
WBC # BLD: 6.99 K/UL — SIGNIFICANT CHANGE UP (ref 3.8–10.5)
WBC # FLD AUTO: 6.99 K/UL — SIGNIFICANT CHANGE UP (ref 3.8–10.5)

## 2023-09-26 PROCEDURE — 99232 SBSQ HOSP IP/OBS MODERATE 35: CPT

## 2023-09-26 RX ORDER — ENOXAPARIN SODIUM 100 MG/ML
40 INJECTION SUBCUTANEOUS EVERY 24 HOURS
Refills: 0 | Status: DISCONTINUED | OUTPATIENT
Start: 2023-09-26 | End: 2023-10-05

## 2023-09-26 RX ADMIN — Medication 300 MILLIGRAM(S): at 09:12

## 2023-09-26 RX ADMIN — AMLODIPINE BESYLATE 2.5 MILLIGRAM(S): 2.5 TABLET ORAL at 09:11

## 2023-09-26 RX ADMIN — Medication 650 MILLIGRAM(S): at 04:21

## 2023-09-26 RX ADMIN — Medication 81 MILLIGRAM(S): at 09:11

## 2023-09-26 RX ADMIN — ENOXAPARIN SODIUM 40 MILLIGRAM(S): 100 INJECTION SUBCUTANEOUS at 12:42

## 2023-09-26 RX ADMIN — RISPERIDONE 0.25 MILLIGRAM(S): 4 TABLET ORAL at 22:00

## 2023-09-26 RX ADMIN — RISPERIDONE 0.25 MILLIGRAM(S): 4 TABLET ORAL at 09:11

## 2023-09-26 RX ADMIN — Medication 650 MILLIGRAM(S): at 05:00

## 2023-09-26 NOTE — PROGRESS NOTE ADULT - ASSESSMENT
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 Patient was seen by Psychiatrist as an outpatient and was prescribed psychotropic meds- but patient only took one dose and did not comply. She feels that patient has been more depressed since the passing of his spouse 2 years ago.   son [phone]    Case d/w team on IDR.

## 2023-09-26 NOTE — PROGRESS NOTE ADULT - SUBJECTIVE AND OBJECTIVE BOX
86 y/o male w/ a PMHx of gout, hematuria, HTN, renal stones, OA, and skin CA presents to the ED via EMS for agitation. Pt was d/c from  ED his morning and was talking back to [facility] where staff requested to send pt back to the ED d/t behavioral misconduct. Pt calm and cooperative in ED. Pt denies having any acting out episodes. No other complaints at this time. Per facility, pt was throwing things at facility and was becoming hostile at the staff and other members, witnessed by staff supervisor and pt daughter. Facility also reports that pt had his right arm sling fully off at one point.  Adm for sudden change MS, combative now resolved. Non focal exam. He was recently hospitalized for right shoulder ortho intervention.      9/25- patient was seen and examined- appeared calm - oriented x3- on constant observation.   9/26- patient was seen and exmianed. Ambulating with CO- oriented x2-3, impulsive, restless.     Vital Signs Last 24 Hrs  T(C): 37.2 (26 Sep 2023 07:57), Max: 37.4 (25 Sep 2023 20:58)  T(F): 98.9 (26 Sep 2023 07:57), Max: 99.3 (25 Sep 2023 20:58)  HR: 82 (26 Sep 2023 09:00) (73 - 97)  BP: 133/78 (26 Sep 2023 09:00) (103/76 - 135/79)  BP(mean): --  RR: 17 (26 Sep 2023 07:57) (17 - 18)  SpO2: 96% (26 Sep 2023 07:57) (96% - 97%)    Parameters below as of 26 Sep 2023 07:57  Patient On (Oxygen Delivery Method): room air      ROS:   All 10 systems reviewed and found to be negative with the exception of what has been described above.     PE:  Constitutional: NAD, laying in bed  HEENT: NC/AT  Back: no tenderness  Respiratory: respirations even and non labored, LCTA  Cardiovascular: S1S2 regular, no murmurs  Abdomen: soft, not tender, not distended, positive BS  Genitourinary: voiding  Musculoskeletal: no muscle tenderness, no joint swelling or tenderness  Extremities: no pedal edema - right arm splint    Neurological: no focal deficits    MEDICATIONS  (STANDING):  allopurinol 300 milliGRAM(s) Oral daily  amLODIPine   Tablet 2.5 milliGRAM(s) Oral daily  aspirin enteric coated 81 milliGRAM(s) Oral daily  influenza  Vaccine (HIGH DOSE) 0.7 milliLiter(s) IntraMuscular once  risperiDONE   Tablet 0.25 milliGRAM(s) Oral two times a day    MEDICATIONS  (PRN):  acetaminophen     Tablet .. 650 milliGRAM(s) Oral every 6 hours PRN Mild Pain (1 - 3)  aluminum hydroxide/magnesium hydroxide/simethicone Suspension 30 milliLiter(s) Oral every 4 hours PRN Dyspepsia  haloperidol    Injectable 1 milliGRAM(s) IntraMuscular every 8 hours PRN Agitation  LORazepam   Injectable 1 milliGRAM(s) IV Push every 4 hours PRN Agitation  melatonin 3 milliGRAM(s) Oral at bedtime PRN Insomnia  ondansetron Injectable 4 milliGRAM(s) IV Push every 6 hours PRN Nausea and/or Vomiting      09-26    139  |  107  |  19  ----------------------------<  121<H>  3.6   |  28  |  0.89    Ca    8.8      26 Sep 2023 06:51                          10.7   6.99  )-----------( 308      ( 26 Sep 2023 06:51 )             32.9         09-25    144  |  111<H>  |  14  ----------------------------<  98  3.9   |  28  |  0.73    Ca    9.3      25 Sep 2023 07:21    TPro  7.7  /  Alb  3.2<L>  /  TBili  0.5  /  DBili  x   /  AST  26  /  ALT  35  /  AlkPhos  131<H>  09-23                            11.1   6.95  )-----------( 305      ( 25 Sep 2023 07:21 )             34.3       < from: CT Head No Cont (09.23.23 @ 20:23) >   CT BRAIN   ORDERED BY: WAYNE DE LEON     PROCEDURE DATE:  09/23/2023          INTERPRETATION:  PROCEDURE: CT head without contrast.    INDICATION: Altered mental status    TECHNIQUE: Serial axial sections were obtained at 5 mm intervals.   Sagittal and coronal reformatted images were obtained from the axial data   set. The images were reviewed in brain and bone windows.    COMPARISON: None    FINDINGS: The CT examination demonstrates generalized volume loss. There   is no midline shift or extra axial collections. The gray white   differentiation appears within normal limits. There is no intracranial   hemorrhage or acute transcortical infarct. There are patchy areas of   hypodensity within the periventricular white matter which may represent   the sequela of small vessel ischemic disease.    The bony windows demonstrates no fractures. The lenses are absent   secondary to prior cataract surgery. The visualized paranasal sinuses are   within normal limits. The mastoid air cells are well aerated.    IMPRESSION: No intracranial hemorrhage or acute transcortical infarct.   Generalized volume loss with small vessel ischemic disease.    < end of copied text >

## 2023-09-27 ENCOUNTER — TRANSCRIPTION ENCOUNTER (OUTPATIENT)
Age: 88
End: 2023-09-27

## 2023-09-27 DIAGNOSIS — Z96.642 PRESENCE OF LEFT ARTIFICIAL HIP JOINT: ICD-10-CM

## 2023-09-27 DIAGNOSIS — M19.011 PRIMARY OSTEOARTHRITIS, RIGHT SHOULDER: ICD-10-CM

## 2023-09-27 DIAGNOSIS — I10 ESSENTIAL (PRIMARY) HYPERTENSION: ICD-10-CM

## 2023-09-27 DIAGNOSIS — Z79.82 LONG TERM (CURRENT) USE OF ASPIRIN: ICD-10-CM

## 2023-09-27 DIAGNOSIS — M19.90 UNSPECIFIED OSTEOARTHRITIS, UNSPECIFIED SITE: ICD-10-CM

## 2023-09-27 DIAGNOSIS — E78.5 HYPERLIPIDEMIA, UNSPECIFIED: ICD-10-CM

## 2023-09-27 DIAGNOSIS — Z96.653 PRESENCE OF ARTIFICIAL KNEE JOINT, BILATERAL: ICD-10-CM

## 2023-09-27 DIAGNOSIS — Z88.1 ALLERGY STATUS TO OTHER ANTIBIOTIC AGENTS STATUS: ICD-10-CM

## 2023-09-27 DIAGNOSIS — Z88.6 ALLERGY STATUS TO ANALGESIC AGENT: ICD-10-CM

## 2023-09-27 DIAGNOSIS — Z85.828 PERSONAL HISTORY OF OTHER MALIGNANT NEOPLASM OF SKIN: ICD-10-CM

## 2023-09-27 PROCEDURE — 99232 SBSQ HOSP IP/OBS MODERATE 35: CPT

## 2023-09-27 RX ADMIN — RISPERIDONE 0.25 MILLIGRAM(S): 4 TABLET ORAL at 23:00

## 2023-09-27 RX ADMIN — Medication 81 MILLIGRAM(S): at 09:22

## 2023-09-27 RX ADMIN — AMLODIPINE BESYLATE 2.5 MILLIGRAM(S): 2.5 TABLET ORAL at 09:23

## 2023-09-27 RX ADMIN — Medication 650 MILLIGRAM(S): at 09:03

## 2023-09-27 RX ADMIN — RISPERIDONE 0.25 MILLIGRAM(S): 4 TABLET ORAL at 09:22

## 2023-09-27 RX ADMIN — ENOXAPARIN SODIUM 40 MILLIGRAM(S): 100 INJECTION SUBCUTANEOUS at 12:03

## 2023-09-27 RX ADMIN — Medication 300 MILLIGRAM(S): at 09:22

## 2023-09-27 RX ADMIN — Medication 650 MILLIGRAM(S): at 08:03

## 2023-09-27 NOTE — DISCHARGE NOTE PROVIDER - CARE PROVIDERS DIRECT ADDRESSES
[email or web address]

## 2023-09-27 NOTE — PROGRESS NOTE ADULT - SUBJECTIVE AND OBJECTIVE BOX
86 y/o male w/ a PMHx of gout, hematuria, HTN, renal stones, OA, and skin CA presents to the ED via EMS for agitation. Pt was d/c from  ED his morning and was talking back to [facility] where staff requested to send pt back to the ED d/t behavioral misconduct. Pt calm and cooperative in ED. Pt denies having any acting out episodes. No other complaints at this time. Per facility, pt was throwing things at facility and was becoming hostile at the staff and other members, witnessed by staff supervisor and pt daughter. Facility also reports that pt had his right arm sling fully off at one point.  Adm for sudden change MS, combative now resolved. Non focal exam. He was recently hospitalized for right shoulder ortho intervention.      9/27- patient was seen and examined. calm and cooperative- oriented x2-3. denies CP, fever or chills. c/o right shoulder pain but "improved after tylenol" as per patient.     Vital Signs Last 24 Hrs  T(C): 36.9 (27 Sep 2023 07:18), Max: 37 (26 Sep 2023 21:05)  T(F): 98.4 (27 Sep 2023 07:18), Max: 98.6 (26 Sep 2023 21:05)  HR: 92 (27 Sep 2023 09:15) (81 - 108)  BP: 115/81 (27 Sep 2023 09:15) (105/62 - 145/89)  BP(mean): --  RR: 18 (27 Sep 2023 07:18) (18 - 18)  SpO2: 97% (27 Sep 2023 07:18) (97% - 99%)    Parameters below as of 27 Sep 2023 07:18  Patient On (Oxygen Delivery Method): room air          ROS:   All 10 systems reviewed and found to be negative with the exception of what has been described above.     PE:  Constitutional: NAD, laying in bed  HEENT: NC/AT  Back: no tenderness  Respiratory: respirations even and non labored, LCTA  Cardiovascular: S1S2 regular, no murmurs  Abdomen: soft, not tender, not distended, positive BS  Genitourinary: voiding  Musculoskeletal: no muscle tenderness, no joint swelling or tenderness  Extremities: no pedal edema - right arm splint    Neurological: no focal deficits    MEDICATIONS  (STANDING):  allopurinol 300 milliGRAM(s) Oral daily  amLODIPine   Tablet 2.5 milliGRAM(s) Oral daily  aspirin enteric coated 81 milliGRAM(s) Oral daily  enoxaparin Injectable 40 milliGRAM(s) SubCutaneous every 24 hours  influenza  Vaccine (HIGH DOSE) 0.7 milliLiter(s) IntraMuscular once  risperiDONE   Tablet 0.25 milliGRAM(s) Oral two times a day    MEDICATIONS  (PRN):  acetaminophen     Tablet .. 650 milliGRAM(s) Oral every 6 hours PRN Mild Pain (1 - 3)  aluminum hydroxide/magnesium hydroxide/simethicone Suspension 30 milliLiter(s) Oral every 4 hours PRN Dyspepsia  haloperidol    Injectable 1 milliGRAM(s) IntraMuscular every 8 hours PRN Agitation  LORazepam   Injectable 1 milliGRAM(s) IV Push every 4 hours PRN Agitation  melatonin 3 milliGRAM(s) Oral at bedtime PRN Insomnia  ondansetron Injectable 4 milliGRAM(s) IV Push every 6 hours PRN Nausea and/or Vomiting    09-26    139  |  107  |  19  ----------------------------<  121<H>  3.6   |  28  |  0.89    Ca    8.8      26 Sep 2023 06:51                          10.7   6.99  )-----------( 308      ( 26 Sep 2023 06:51 )             32.9              CT Head No Cont (09.23.23 @ 20:23) >   CT BRAIN   ORDERED BY: WAYNE DE LEON   PROCEDURE DATE:  09/23/2023    INTERPRETATION:  PROCEDURE: CT head without contrast.  INDICATION: Altered mental status  TECHNIQUE: Serial axial sections were obtained at 5 mm intervals.   Sagittal and coronal reformatted images were obtained from the axial data   set. The images were reviewed in brain and bone windows.  COMPARISON: None  FINDINGS: The CT examination demonstrates generalized volume loss. There   is no midline shift or extra axial collections. The gray white   differentiation appears within normal limits. There is no intracranial   hemorrhage or acute transcortical infarct. There are patchy areas of   hypodensity within the periventricular white matter which may represent   the sequela of small vessel ischemic disease.  The bony windows demonstrates no fractures. The lenses are absent   secondary to prior cataract surgery. The visualized paranasal sinuses are   within normal limits. The mastoid air cells are well aerated.  IMPRESSION: No intracranial hemorrhage or acute transcortical infarct.   Generalized volume loss with small vessel ischemic disease.

## 2023-09-27 NOTE — DISCHARGE NOTE PROVIDER - HOSPITAL COURSE
88 y/o male w/ a PMHx of gout, hematuria, HTN, renal stones, OA, and skin CA presents to the ED via EMS for agitation. Pt was d/c from  ED his morning and was talking back to [facility] where staff requested to send pt back to the ED d/t behavioral misconduct. Pt calm and cooperative in ED. Pt denies having any acting out episodes. No other complaints at this time. Per facility, pt was throwing things at facility and was becoming hostile at the staff and other members, witnessed by staff supervisor and pt daughter. Facility also reports that pt had his right arm sling fully off at one point.  Adm for sudden change MS, combative now resolved. Non focal exam. He was recently hospitalized for right shoulder ortho intervention.      PLAN  * Toxic metab encephalopathy- appears to be resolved   suspect baseline dementia based on CT findings and as per daughter this clear worsening dementia   Psych consult  -started on risperdal and Lorazepam as need for agitaiton  - has not needed PRN lorazepam in the past 24 hours.   - will need outpatient Psych f/u       * Recent right shoulder surgery  pain meds as needed  Ortho consult appreciated  outpatient f/u with Ortho       * HTN resume Norvasc    daughter Tristen [phone]  Plan d/w daughter tristen- as per daughter patient has been non compliant with meds/treatment regime. Patient was seen by Psychiatrist as an outpatient and was prescribed psychotropic meds- but patient only took one dose and did not comply. She feels that patient has been more depressed since the passing of his spouse 2 years ago.   son [phone]    Case d/w team on IDR.            88 y/o male w/ a PMHx of gout, hematuria, HTN, renal stones, OA, and skin CA presents to the ED via EMS for agitation. Pt was d/c from  ED his morning and was talking back to [facility] where staff requested to send pt back to the ED d/t behavioral misconduct. Pt calm and cooperative in ED. Pt denies having any acting out episodes. No other complaints at this time. Per facility, pt was throwing things at facility and was becoming hostile at the staff and other members, witnessed by staff supervisor and pt daughter. Facility also reports that pt had his right arm sling fully off at one point.  Adm for sudden change MS, combative now resolved. Non focal exam. He was recently hospitalized for right shoulder ortho intervention.      PLAN  * Toxic metab encephalopathy- appears to be resolved   suspect baseline dementia based on CT findings and as per daughter this clear worsening dementia   Psych consult  -started on risperdal and Lorazepam as need for agitation  - has not needed PRN lorazepam in the past 24 hours.   - will need outpatient Psych f/u  - outpatient neuro for cognitive testing  - MRI results noted        * Recent right shoulder surgery  pain meds as needed  Ortho consult appreciated  outpatient f/u with Ortho     *gout- left hand  - prednisone x3 days  - colchichine  - monitor       * HTN resume Norvasc    daughter Tristen [phone]  Plan d/w daughter tristen- as per daughter patient has been non compliant with meds/treatment regime. Patient was seen by Psychiatrist as an outpatient and was prescribed psychotropic meds- but patient only took one dose and did not comply. She feels that patient has been more depressed since the passing of his spouse 2 years ago.   son [phone]    Case d/w team on IDR.   Plan for dc to rehab.

## 2023-09-27 NOTE — DISCHARGE NOTE PROVIDER - NSDCFUSCHEDAPPT_GEN_ALL_CORE_FT
Sergei Macias  [facility]  ORTHOSURG [address]  Scheduled Appointment: 10/02/2023    [facility]  ORTHOSURG [address]  Scheduled Appointment: 11/01/2023    [facility]  ORTHOSURG [address]  Scheduled Appointment: 12/20/2023

## 2023-09-27 NOTE — DISCHARGE NOTE PROVIDER - NSDCCPCAREPLAN_GEN_ALL_CORE_FT
PRINCIPAL DISCHARGE DIAGNOSIS  Diagnosis: Agitation  Assessment and Plan of Treatment: c/w risperdal  will need to f/u with Psychiatrist as an outpatient  f/u with neurology as an outpatient  *right shoulder surgery  - c/w right arm sling  - f/uw ith ORtho as an outpaitent        SECONDARY DISCHARGE DIAGNOSES  Diagnosis: AMS (altered mental status)  Assessment and Plan of Treatment:      PRINCIPAL DISCHARGE DIAGNOSIS  Diagnosis: Agitation  Assessment and Plan of Treatment: c/w risperdal  will need to f/u with Psychiatrist as an outpatient  f/u with neurology as an outpatient  *right shoulder surgery  - c/w right arm sling  - f/uw ith ORtho as an outpaitent  *left hand gout  - you will need to be on colchicine for 2 more days then DC  you will need to f/u with PCP or rehab doctors when allopurinol should be restarted.         SECONDARY DISCHARGE DIAGNOSES  Diagnosis: AMS (altered mental status)  Assessment and Plan of Treatment:

## 2023-09-27 NOTE — DISCHARGE NOTE PROVIDER - ATTENDING DISCHARGE PHYSICAL EXAMINATION:
Patient seen and examined with LORRAINE Tinsley.  I was physically present for the key portions of the evaluation and management (E/M) service provided.  I agree with the above history, physical, and plan which I have reviewed and edited where appropriate.  - hand swelling improved. likely acute gout flare - continue colchicine for 2 more days then consider low dose allopurinol to be restarted in 7-10 days and titrate upto his home dose  - outpt f/u with ortho  - DONNA today

## 2023-09-27 NOTE — DISCHARGE NOTE PROVIDER - NSDCMRMEDTOKEN_GEN_ALL_CORE_FT
allopurinol 300 mg oral tablet: 1 orally once a day  amLODIPine 2.5 mg oral tablet: 1 orally once a day  Aspirin Enteric Coated 81 mg oral delayed release tablet: 1 tab(s) orally once a day MDD: 1  MiraLax oral powder for reconstitution: 17 gram(s) orally once a day as needed for  constipation  ondansetron 4 mg oral tablet: 1 tab(s) orally every 8 hours as needed for  nausea  oxyCODONE 5 mg oral tablet: 1 tab(s) orally every 4 hours as needed for  severe pain MDD: 6  Protonix 40 mg oral delayed release tablet: 1 tab(s) orally once a day  Senna 8.6 mg oral tablet: 2 tab(s) orally once a day (at bedtime) as needed for  constipation  Tylenol Extra Strength 500 mg oral tablet: 2 tab(s) orally every 8 hours   acetaminophen 325 mg oral tablet: 2 tab(s) orally every 6 hours As needed Mild Pain (1 - 3)  amLODIPine 2.5 mg oral tablet: 1 tab(s) orally once a day  aspirin 81 mg oral tablet: 1 tab(s) orally once a day  clotrimazole-betamethasone dipropionate 1%-0.05% topical cream: 1 Apply topically to affected area 2 times a day  colchicine 0.6 mg oral tablet: 1 tab(s) orally once a day x2 days ONLY  melatonin 3 mg oral tablet: 1 tab(s) orally once a day (at bedtime) As needed Insomnia  polyethylene glycol 3350 oral powder for reconstitution: 17 gram(s) orally once a day  predniSONE 10 mg oral tablet: 1 tab(s) orally once a day x3 more days  Protonix 40 mg oral delayed release tablet: 1 tab(s) orally once a day  QUEtiapine: 12.5 milligram(s) orally every 8 hours as needed for  anxiety  risperiDONE 0.25 mg oral tablet: 1 tab(s) orally 2 times a day

## 2023-09-27 NOTE — DISCHARGE NOTE PROVIDER - CARE PROVIDER_API CALL
Sergei Macias  Orthopaedic Surgery  [address], [address]  [place] [address]  Phone: [phone]  Fax: [phone]  Follow Up Time:     Austin Black  Neurology  [address], [address]  [place] [address]  Phone: [phone]  Fax: [phone]  Follow Up Time:     Ronnell Barba [facility]  [address], [place] [address]  Phone: [phone]  Fax: [phone]  Follow Up Time:

## 2023-09-27 NOTE — PROGRESS NOTE ADULT - ASSESSMENT
* Toxic metab encephalopathy- appears to be resolved   suspect baseline dementia based on CT findings and as per daughter this clear worsening dementia   Psych consult  -started on risperdal and Lorazepam as need for agitaiton  - has not needed PRN lorazepam in the past 24 hours.       * Recent right shoulder surgery  pain meds as needed  Ortho consult apprecaited  outpatient f/u with Ortho       * HTN resume Norvasc    daughter Tristen [phone]  Plan d/w daughter tristen- as per daughter patient has been non compliant with meds/treatment regime. Patient was seen by Psychiatrist as an outpatient and was prescribed psychotropic meds- but patient only took one dose and did not comply. She feels that patient has been more depressed since the passing of his spouse 2 years ago.   son [phone]    Case d/w team on IDR.

## 2023-09-27 NOTE — DISCHARGE NOTE PROVIDER - PROVIDER TOKENS
PROVIDER:[TOKEN:[[id number]]],PROVIDER:[TOKEN:[[id number]]],PROVIDER:[TOKEN:[[id number]]]

## 2023-09-28 LAB — SURGICAL PATHOLOGY STUDY: SIGNIFICANT CHANGE UP

## 2023-09-28 PROCEDURE — 99232 SBSQ HOSP IP/OBS MODERATE 35: CPT

## 2023-09-28 RX ORDER — QUETIAPINE FUMARATE 200 MG/1
12.5 TABLET, FILM COATED ORAL EVERY 8 HOURS
Refills: 0 | Status: DISCONTINUED | OUTPATIENT
Start: 2023-09-28 | End: 2023-10-05

## 2023-09-28 RX ADMIN — Medication 81 MILLIGRAM(S): at 09:25

## 2023-09-28 RX ADMIN — AMLODIPINE BESYLATE 2.5 MILLIGRAM(S): 2.5 TABLET ORAL at 09:26

## 2023-09-28 RX ADMIN — Medication 300 MILLIGRAM(S): at 09:25

## 2023-09-28 RX ADMIN — ENOXAPARIN SODIUM 40 MILLIGRAM(S): 100 INJECTION SUBCUTANEOUS at 11:58

## 2023-09-28 RX ADMIN — Medication 1 MILLIGRAM(S): at 14:56

## 2023-09-28 RX ADMIN — Medication 1 MILLIGRAM(S): at 11:16

## 2023-09-28 RX ADMIN — RISPERIDONE 0.25 MILLIGRAM(S): 4 TABLET ORAL at 21:52

## 2023-09-28 RX ADMIN — RISPERIDONE 0.25 MILLIGRAM(S): 4 TABLET ORAL at 09:25

## 2023-09-28 RX ADMIN — QUETIAPINE FUMARATE 12.5 MILLIGRAM(S): 200 TABLET, FILM COATED ORAL at 14:21
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
## 2023-09-28 NOTE — PROGRESS NOTE ADULT - ASSESSMENT
* Toxic metab encephalopathy- appears to be resolved   suspect baseline dementia based on CT findings and as per daughter this clear worsening dementia   Psych consult  - started on risperdal and Lorazepam as need for agitation  - ativan IM x 2 given today 9/28    * Recent right shoulder surgery  pain meds as needed  Ortho consult apprecaited  outpatient f/u with Ortho       * HTN resume Norvasc    daughter Tristen [phone]    Plan d/w daughter tristen- as per daughter patient has been non compliant with meds/treatment regime. Patient was seen by Psychiatrist as an outpatient and was prescribed psychotropic meds- but patient only took one dose and did not comply. She feels that patient has been more depressed since the passing of his spouse 2 years ago. son [phone]

## 2023-09-29 PROCEDURE — 99232 SBSQ HOSP IP/OBS MODERATE 35: CPT

## 2023-09-29 RX ADMIN — QUETIAPINE FUMARATE 12.5 MILLIGRAM(S): 200 TABLET, FILM COATED ORAL at 18:35

## 2023-09-29 RX ADMIN — Medication 300 MILLIGRAM(S): at 08:26

## 2023-09-29 RX ADMIN — AMLODIPINE BESYLATE 2.5 MILLIGRAM(S): 2.5 TABLET ORAL at 08:25

## 2023-09-29 RX ADMIN — Medication 3 MILLIGRAM(S): at 21:18

## 2023-09-29 RX ADMIN — Medication 81 MILLIGRAM(S): at 08:25

## 2023-09-29 RX ADMIN — RISPERIDONE 0.25 MILLIGRAM(S): 4 TABLET ORAL at 21:17

## 2023-09-29 RX ADMIN — QUETIAPINE FUMARATE 12.5 MILLIGRAM(S): 200 TABLET, FILM COATED ORAL at 08:26

## 2023-09-29 RX ADMIN — ENOXAPARIN SODIUM 40 MILLIGRAM(S): 100 INJECTION SUBCUTANEOUS at 08:25

## 2023-09-29 RX ADMIN — RISPERIDONE 0.25 MILLIGRAM(S): 4 TABLET ORAL at 08:25

## 2023-09-29 NOTE — PROGRESS NOTE ADULT - SUBJECTIVE AND OBJECTIVE BOX
HPI: 86 y/o male w/ a PMHx of gout, hematuria, HTN, renal stones, OA, and skin CA presents to the ED via EMS for agitation. Pt was d/c from  ED his morning and was talking back to [facility] where staff requested to send pt back to the ED d/t behavioral misconduct. Pt calm and cooperative in ED. Pt denies having any acting out episodes. No other complaints at this time. Per facility, pt was throwing things at facility and was becoming hostile at the staff and other members, witnessed by staff supervisor and pt daughter. Facility also reports that pt had his right arm sling fully off at one point. Pt seen in 5 s, tried to leave, easily oriented, can count 10-1 but unable to count MOYB; s/p total of 3 mg Ativan in ED.  Adm for sudden change MS, combative now resolved. Non focal exam. He was recently hospitalized for right shoulder ortho intervention. Case d/w daughter he has signs of dementia for years; misplaced things, forgetful, unable to organize home, food, v disorganized can be violent (24 Sep 2023 09:47)    9/28: Patient seen today, feels well, ambulating in garces way, code grey called x 2 for agitation, pt grabbed knife off of roommate's tray, then went out of unit, grabbed a pen holding it as a weapon, received ativan IM x 2.     9/29:     MEDICATIONS  (STANDING):  allopurinol 300 milliGRAM(s) Oral daily  amLODIPine   Tablet 2.5 milliGRAM(s) Oral daily  aspirin enteric coated 81 milliGRAM(s) Oral daily  enoxaparin Injectable 40 milliGRAM(s) SubCutaneous every 24 hours  influenza  Vaccine (HIGH DOSE) 0.7 milliLiter(s) IntraMuscular once  risperiDONE   Tablet 0.25 milliGRAM(s) Oral two times a day    MEDICATIONS  (PRN):  acetaminophen     Tablet .. 650 milliGRAM(s) Oral every 6 hours PRN Mild Pain (1 - 3)  aluminum hydroxide/magnesium hydroxide/simethicone Suspension 30 milliLiter(s) Oral every 4 hours PRN Dyspepsia  haloperidol    Injectable 1 milliGRAM(s) IntraMuscular every 8 hours PRN Agitation  LORazepam   Injectable 1 milliGRAM(s) IV Push every 4 hours PRN Agitation  melatonin 3 milliGRAM(s) Oral at bedtime PRN Insomnia  ondansetron Injectable 4 milliGRAM(s) IV Push every 6 hours PRN Nausea and/or Vomiting  QUEtiapine 12.5 milliGRAM(s) Oral every 8 hours PRN agitation      Vital Signs Last 24 Hrs  T(C): 37 (29 Sep 2023 08:13), Max: 37 (29 Sep 2023 08:13)  T(F): 98.6 (29 Sep 2023 08:13), Max: 98.6 (29 Sep 2023 08:13)  HR: 89 (29 Sep 2023 08:13) (75 - 94)  BP: 118/85 (29 Sep 2023 08:13) (118/85 - 148/76)  RR: 18 (29 Sep 2023 08:13) (18 - 18)  SpO2: 97% (29 Sep 2023 08:13) (97% - 97%)    Parameters below as of 29 Sep 2023 08:13  Patient On (Oxygen Delivery Method): room air        PHYSICAL EXAM:  GENERAL: NAD, lying in bed comfortably  HEAD:  Atraumatic, Normocephalic  EYES: conjunctiva and sclera clear  ENT: Moist mucous membranes  NECK: Supple, No JVD  CHEST/LUNG: Clear to auscultation bilaterally; No rales, rhonchi, wheezing. Unlabored respirations  HEART: Regular rate and rhythm; No murmurs  ABDOMEN: Bowel sounds present; Soft, Nontender, Nondistended.   EXTREMITIES:  2+ Peripheral Pulses, brisk capillary refill. No clubbing, cyanosis, or edema  NERVOUS SYSTEM:  Alert & Oriented X3, speech clear. No deficits   MSK: FROM all 4 extremities, full and equal strength        LABS:                CAPILLARY BLOOD GLUCOSE                RADIOLOGY:         HPI: 88 y/o male w/ a PMHx of gout, hematuria, HTN, renal stones, OA, and skin CA presents to the ED via EMS for agitation. Pt was d/c from  ED his morning and was talking back to [facility] where staff requested to send pt back to the ED d/t behavioral misconduct. Pt calm and cooperative in ED. Pt denies having any acting out episodes. No other complaints at this time. Per facility, pt was throwing things at facility and was becoming hostile at the staff and other members, witnessed by staff supervisor and pt daughter. Facility also reports that pt had his right arm sling fully off at one point. Pt seen in 5 s, tried to leave, easily oriented, can count 10-1 but unable to count MOYB; s/p total of 3 mg Ativan in ED.  Adm for sudden change MS, combative now resolved. Non focal exam. He was recently hospitalized for right shoulder ortho intervention. Case d/w daughter he has signs of dementia for years; misplaced things, forgetful, unable to organize home, food, v disorganized can be violent (24 Sep 2023 09:47)    9/28: Patient seen today, feels well, ambulating in [place] way, code grey called x 2 for agitation, pt grabbed knife off of roommate's tray, then went out of unit, grabbed a pen holding it as a weapon, received ativan IM x 2.     9/29: Patient seen today, awake, alert, calm today, no complaints. No overnight issues reported.     MEDICATIONS  (STANDING):  allopurinol 300 milliGRAM(s) Oral daily  amLODIPine   Tablet 2.5 milliGRAM(s) Oral daily  aspirin enteric coated 81 milliGRAM(s) Oral daily  enoxaparin Injectable 40 milliGRAM(s) SubCutaneous every 24 hours  influenza  Vaccine (HIGH DOSE) 0.7 milliLiter(s) IntraMuscular once  risperiDONE   Tablet 0.25 milliGRAM(s) Oral two times a day    MEDICATIONS  (PRN):  acetaminophen     Tablet .. 650 milliGRAM(s) Oral every 6 hours PRN Mild Pain (1 - 3)  aluminum hydroxide/magnesium hydroxide/simethicone Suspension 30 milliLiter(s) Oral every 4 hours PRN Dyspepsia  haloperidol    Injectable 1 milliGRAM(s) IntraMuscular every 8 hours PRN Agitation  LORazepam   Injectable 1 milliGRAM(s) IV Push every 4 hours PRN Agitation  melatonin 3 milliGRAM(s) Oral at bedtime PRN Insomnia  ondansetron Injectable 4 milliGRAM(s) IV Push every 6 hours PRN Nausea and/or Vomiting  QUEtiapine 12.5 milliGRAM(s) Oral every 8 hours PRN agitation      Vital Signs Last 24 Hrs  T(C): 37 (29 Sep 2023 08:13), Max: 37 (29 Sep 2023 08:13)  T(F): 98.6 (29 Sep 2023 08:13), Max: 98.6 (29 Sep 2023 08:13)  HR: 89 (29 Sep 2023 08:13) (75 - 94)  BP: 118/85 (29 Sep 2023 08:13) (118/85 - 148/76)  RR: 18 (29 Sep 2023 08:13) (18 - 18)  SpO2: 97% (29 Sep 2023 08:13) (97% - 97%)    Parameters below as of 29 Sep 2023 08:13  Patient On (Oxygen Delivery Method): room air        PHYSICAL EXAM:  GENERAL: NAD, lying in bed comfortably  HEAD:  Atraumatic, Normocephalic  EYES: conjunctiva and sclera clear  ENT: Moist mucous membranes  NECK: Supple, No JVD  CHEST/LUNG: Clear to auscultation bilaterally; No rales, rhonchi, wheezing. Unlabored respirations  HEART: Regular rate and rhythm; No murmurs  ABDOMEN: Bowel sounds present; Soft, Nontender, Nondistended.   EXTREMITIES:  2+ Peripheral Pulses, brisk capillary refill. No clubbing, cyanosis, or edema  NERVOUS SYSTEM:  Alert & Oriented X2, speech clear. No deficits   MSK: FROM all 4 extremities, full and equal strength        LABS:                CAPILLARY BLOOD GLUCOSE                RADIOLOGY:

## 2023-09-29 NOTE — PROGRESS NOTE ADULT - ASSESSMENT
* Toxic metab encephalopathy- appears to be resolved   suspect baseline dementia based on CT findings and as per daughter this clear worsening dementia   Psych consult  - started on risperdal and Lorazepam as need for agitation  - ativan IM x 2 given today 9/28    * Recent right shoulder surgery  pain meds as needed  Ortho consult apprecaited  outpatient f/u with Ortho       * HTN resume Norvasc    daughter Tristen [phone]    Plan d/w daughter tristen- as per daughter patient has been non compliant with meds/treatment regime. Patient was seen by Psychiatrist as an outpatient and was prescribed psychotropic meds- but patient only took one dose and did not comply. She feels that patient has been more depressed since the passing of his spouse 2 years ago. son [phone]               * Toxic metab encephalopathy- appears to be resolved   suspect baseline dementia based on CT findings and as per daughter this clear worsening dementia   Psych consult  - started on Risperdal and Lorazepam as need for agitation  - ativan IM x 2 given 9/28  - re-consult   - Seroquel PRN     * Recent right shoulder surgery  - pain meds as needed  - Ortho consult appreciated  - outpatient f/u with Ortho       * HTN resume Norvasc    daughter Tristen [phone]    Plan d/w daughter tristen- as per daughter patient has been non compliant with meds/treatment regime. Patient was seen by Psychiatrist as an outpatient and was prescribed psychotropic meds- but patient only took one dose and did not comply. She feels that patient has been more depressed since the passing of his spouse 2 years ago. son [phone]

## 2023-09-30 PROCEDURE — 99232 SBSQ HOSP IP/OBS MODERATE 35: CPT

## 2023-09-30 RX ORDER — HALOPERIDOL DECANOATE 100 MG/ML
1 INJECTION INTRAMUSCULAR EVERY 8 HOURS
Refills: 0 | Status: DISCONTINUED | OUTPATIENT
Start: 2023-09-30 | End: 2023-10-05

## 2023-09-30 RX ORDER — CLOTRIMAZOLE AND BETAMETHASONE DIPROPIONATE 10; .5 MG/G; MG/G
1 CREAM TOPICAL
Refills: 0 | Status: DISCONTINUED | OUTPATIENT
Start: 2023-09-30 | End: 2023-10-05

## 2023-09-30 RX ADMIN — HALOPERIDOL DECANOATE 1 MILLIGRAM(S): 100 INJECTION INTRAMUSCULAR at 23:40

## 2023-09-30 RX ADMIN — Medication 81 MILLIGRAM(S): at 09:01

## 2023-09-30 RX ADMIN — Medication 1 MILLIGRAM(S): at 23:37

## 2023-09-30 RX ADMIN — QUETIAPINE FUMARATE 12.5 MILLIGRAM(S): 200 TABLET, FILM COATED ORAL at 13:52

## 2023-09-30 RX ADMIN — ENOXAPARIN SODIUM 40 MILLIGRAM(S): 100 INJECTION SUBCUTANEOUS at 09:01

## 2023-09-30 RX ADMIN — CLOTRIMAZOLE AND BETAMETHASONE DIPROPIONATE 1 APPLICATION(S): 10; .5 CREAM TOPICAL at 17:38

## 2023-09-30 RX ADMIN — AMLODIPINE BESYLATE 2.5 MILLIGRAM(S): 2.5 TABLET ORAL at 09:01

## 2023-09-30 RX ADMIN — Medication 300 MILLIGRAM(S): at 09:02

## 2023-09-30 RX ADMIN — RISPERIDONE 0.25 MILLIGRAM(S): 4 TABLET ORAL at 09:01

## 2023-09-30 NOTE — PROGRESS NOTE ADULT - ASSESSMENT
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 Patient was seen by Psychiatrist as an outpatient and was prescribed psychotropic meds- but patient only took one dose and did not comply. She feels that patient has been more depressed since the passing of his spouse 2 years ago. son [phone]      DISPO: D/C planning to [facility]

## 2023-09-30 NOTE — PROGRESS NOTE ADULT - SUBJECTIVE AND OBJECTIVE BOX
HPI: 88 y/o male w/ a PMHx of gout, hematuria, HTN, renal stones, OA, and skin CA presents to the ED via EMS for agitation. Pt was d/c from  ED his morning and was talking back to [facility] where staff requested to send pt back to the ED d/t behavioral misconduct. Pt calm and cooperative in ED. Pt denies having any acting out episodes. No other complaints at this time. Per facility, pt was throwing things at facility and was becoming hostile at the staff and other members, witnessed by staff supervisor and pt daughter. Facility also reports that pt had his right arm sling fully off at one point. Pt seen in 5 s, tried to leave, easily oriented, can count 10-1 but unable to count MOYB; s/p total of 3 mg Ativan in ED.  Adm for sudden change MS, combative now resolved. Non focal exam. He was recently hospitalized for right shoulder ortho intervention. Case d/w daughter he has signs of dementia for years; misplaced things, forgetful, unable to organize home, food, v disorganized can be violent (24 Sep 2023 09:47)    9/28: Patient seen today, feels well, ambulating in gar[place] way, code grey called x 2 for agitation, pt grabbed knife off of roommate's tray, then went out of unit, grabbed a pen holding it as a weapon, received ativan IM x 2.     9/29: Patient seen today, awake, alert, calm today, no complaints. No overnight issues reported.     9/30: Patient seen today, awake, alert, c/o rash to back - has been present for few months now, pt states he applies athlete's foot cream to it.     REVIEW OF SYSTEMS:    CONSTITUTIONAL: No weakness, fevers or chills  EYES/ENT: No visual changes;  No vertigo or throat pain   NECK: No pain or stiffness  RESPIRATORY: No cough, wheezing, hemoptysis; No shortness of breath  CARDIOVASCULAR: No chest pain or palpitations  GASTROINTESTINAL: No abdominal or epigastric pain. No nausea, vomiting, or hematemesis; No diarrhea or constipation. No melena or hematochezia.  GENITOURINARY: No dysuria, frequency or hematuria  NEUROLOGICAL: No numbness or weakness  SKIN: No itching, +rashes      MEDICATIONS  (STANDING):  allopurinol 300 milliGRAM(s) Oral daily  amLODIPine   Tablet 2.5 milliGRAM(s) Oral daily  aspirin enteric coated 81 milliGRAM(s) Oral daily  enoxaparin Injectable 40 milliGRAM(s) SubCutaneous every 24 hours  influenza  Vaccine (HIGH DOSE) 0.7 milliLiter(s) IntraMuscular once  risperiDONE   Tablet 0.25 milliGRAM(s) Oral two times a day    MEDICATIONS  (PRN):  acetaminophen     Tablet .. 650 milliGRAM(s) Oral every 6 hours PRN Mild Pain (1 - 3)  aluminum hydroxide/magnesium hydroxide/simethicone Suspension 30 milliLiter(s) Oral every 4 hours PRN Dyspepsia  haloperidol    Injectable 1 milliGRAM(s) IntraMuscular every 8 hours PRN Agitation  LORazepam   Injectable 1 milliGRAM(s) IV Push every 4 hours PRN Agitation  melatonin 3 milliGRAM(s) Oral at bedtime PRN Insomnia  ondansetron Injectable 4 milliGRAM(s) IV Push every 6 hours PRN Nausea and/or Vomiting  QUEtiapine 12.5 milliGRAM(s) Oral every 8 hours PRN agitation      Vital Signs Last 24 Hrs  T(C): 36.8 (30 Sep 2023 16:35), Max: 37.1 (29 Sep 2023 17:00)  T(F): 98.2 (30 Sep 2023 16:35), Max: 98.8 (29 Sep 2023 17:00)  HR: 99 (30 Sep 2023 16:35) (79 - 99)  BP: 113/73 (30 Sep 2023 16:35) (113/73 - 119/88)  RR: 18 (30 Sep 2023 16:35) (18 - 18)  SpO2: 99% (30 Sep 2023 16:35) (97% - 99%)    Parameters below as of 30 Sep 2023 16:35  Patient On (Oxygen Delivery Method): room air          PHYSICAL EXAM:  GENERAL: NAD, lying in bed comfortably  HEAD:  Atraumatic, Normocephalic  EYES: conjunctiva and sclera clear  ENT: Moist mucous membranes  NECK: Supple, No JVD  CHEST/LUNG: Clear to auscultation bilaterally; No rales, rhonchi, wheezing. Unlabored respirations  HEART: Regular rate and rhythm; No murmurs  ABDOMEN: Bowel sounds present; Soft, Nontender, Nondistended.   EXTREMITIES:  2+ Peripheral Pulses, brisk capillary refill. No clubbing, cyanosis, or edema  NERVOUS SYSTEM:  Alert & Oriented X2, speech clear. No deficits   MSK: FROM all 4 extremities, full and equal strength  SKIN: Large area of rash to b/l lower back area, plaque like, with raised borders, itchy, erythematous       LABS:                CAPILLARY BLOOD GLUCOSE                RADIOLOGY:

## 2023-10-01 PROCEDURE — 99232 SBSQ HOSP IP/OBS MODERATE 35: CPT

## 2023-10-01 RX ADMIN — Medication 300 MILLIGRAM(S): at 09:02

## 2023-10-01 RX ADMIN — RISPERIDONE 0.25 MILLIGRAM(S): 4 TABLET ORAL at 09:01

## 2023-10-01 RX ADMIN — CLOTRIMAZOLE AND BETAMETHASONE DIPROPIONATE 1 APPLICATION(S): 10; .5 CREAM TOPICAL at 21:20

## 2023-10-01 RX ADMIN — Medication 650 MILLIGRAM(S): at 12:42

## 2023-10-01 RX ADMIN — Medication 650 MILLIGRAM(S): at 11:42

## 2023-10-01 RX ADMIN — QUETIAPINE FUMARATE 12.5 MILLIGRAM(S): 200 TABLET, FILM COATED ORAL at 18:11

## 2023-10-01 RX ADMIN — RISPERIDONE 0.25 MILLIGRAM(S): 4 TABLET ORAL at 19:38

## 2023-10-01 RX ADMIN — CLOTRIMAZOLE AND BETAMETHASONE DIPROPIONATE 1 APPLICATION(S): 10; .5 CREAM TOPICAL at 09:02

## 2023-10-01 RX ADMIN — ENOXAPARIN SODIUM 40 MILLIGRAM(S): 100 INJECTION SUBCUTANEOUS at 09:01

## 2023-10-01 RX ADMIN — Medication 81 MILLIGRAM(S): at 09:01

## 2023-10-01 RX ADMIN — AMLODIPINE BESYLATE 2.5 MILLIGRAM(S): 2.5 TABLET ORAL at 09:01

## 2023-10-01 NOTE — PROVIDER CONTACT NOTE (CHANGE IN STATUS NOTIFICATION) - ACTION/TREATMENT ORDERED:
ordered Ativan IV PRN, positive effect noted. Patient calm and 1 to 1 in place.
pt given IV ativan 1mg  as per order for agitation . Dr. Bernstein assessed pt and ordered IM haldol 1mg. pt assisted to bed

## 2023-10-01 NOTE — PROVIDER CONTACT NOTE (CHANGE IN STATUS NOTIFICATION) - SITUATION
Patient hitting and kicking staff, throwing any item he can find in reach. Haldol given with little relief.
pt while with staff put on clothes and walked to elevators to leave . code grey called. pt aox2

## 2023-10-01 NOTE — PROGRESS NOTE ADULT - SUBJECTIVE AND OBJECTIVE BOX
HPI: 86 y/o male w/ a PMHx of gout, hematuria, HTN, renal stones, OA, and skin CA presents to the ED via EMS for agitation. Pt was d/c from  ED his morning and was talking back to [facility] where staff requested to send pt back to the ED d/t behavioral misconduct. Pt calm and cooperative in ED. Pt denies having any acting out episodes. No other complaints at this time. Per facility, pt was throwing things at facility and was becoming hostile at the staff and other members, witnessed by staff supervisor and pt daughter. Facility also reports that pt had his right arm sling fully off at one point. Pt seen in 5 s, tried to leave, easily oriented, can count 10-1 but unable to count MOYB; s/p total of 3 mg Ativan in ED.  Adm for sudden change MS, combative now resolved. Non focal exam. He was recently hospitalized for right shoulder ortho intervention. Case d/w daughter he has signs of dementia for years; misplaced things, forgetful, unable to organize home, food, v disorganized can be violent (24 Sep 2023 09:47)    9/28: Patient seen today, feels well, ambulating in gar[address] way, code grey called x 2 for agitation, pt grabbed knife off of roommate's tray, then went out of unit, grabbed a pen holding it as a weapon, received ativan IM x 2.     9/29: Patient seen today, awake, alert, calm today, no complaints. No overnight issues reported.     9/30: Patient seen today, awake, alert, c/o rash to back - has been present for few months now, pt states he applies athlete's foot cream to it.   10/1 - agitated last night. calm and cooperative this AM. no cp, sob    ROS:   All 10 systems reviewed and found to be negative with the exception of what has been described above.    n      Vital Signs Last 24 Hrs  T(C): 36.8 (30 Sep 2023 16:35), Max: 37.1 (29 Sep 2023 17:00)  T(F): 98.2 (30 Sep 2023 16:35), Max: 98.8 (29 Sep 2023 17:00)  HR: 99 (30 Sep 2023 16:35) (79 - 99)  BP: 113/73 (30 Sep 2023 16:35) (113/73 - 119/88)  RR: 18 (30 Sep 2023 16:35) (18 - 18)  SpO2: 99% (30 Sep 2023 16:35) (97% - 99%)    Parameters below as of 30 Sep 2023 16:35  Patient On (Oxygen Delivery Method): room air          PHYSICAL EXAM:  GENERAL: NAD, lying in bed comfortably  HEAD:  Atraumatic, Normocephalic  EYES: conjunctiva and sclera clear  ENT: Moist mucous membranes  NECK: Supple, No JVD  CHEST/LUNG: Clear to auscultation bilaterally; No rales, rhonchi, wheezing. Unlabored respirations  HEART: Regular rate and rhythm; No murmurs  ABDOMEN: Bowel sounds present; Soft, Nontender, Nondistended.   EXTREMITIES:  2+ Peripheral Pulses, brisk capillary refill. No clubbing, cyanosis, or edema  NERVOUS SYSTEM:  Alert & Oriented X2, speech clear. No deficits   MSK: FROM all 4 extremities, full and equal strength  SKIN: Large area of rash to b/l lower back area, plaque like, with raised borders, itchy, erythematous       LABS:        CAPILLARY BLOOD GLUCOSE                RADIOLOGY:

## 2023-10-01 NOTE — PROVIDER CONTACT NOTE (CHANGE IN STATUS NOTIFICATION) - ASSESSMENT
pt exhibiting paranoid behaviour . pt aox2 . pt refuses evening medication. pt refuse VS after code grey

## 2023-10-01 NOTE — PROGRESS NOTE ADULT - ASSESSMENT
* Toxic metab encephalopathy- appears to be resolved   suspect baseline dementia based on CT findings and as per daughter this clear worsening dementia   Psych consult  - started on Risperdal and Lorazepam as need for agitation  - ativan IM x 2 given 9/28  - re-consult    - Seroquel PRN     * Recent right shoulder surgery  - pain meds as needed  - Ortho consult appreciated  - outpatient f/u with Ortho       * HTN resume Norvasc    daughter Tristen [phone]    Plan d/w daughter tristen- as per daughter patient has been non compliant with meds/treatment regime. Patient was seen by Psychiatrist as an outpatient and was prescribed psychotropic meds- but patient only took one dose and did not comply. She feels that patient has been more depressed since the passing of his spouse 2 years ago. son [phone]      DISPO: D/C planning to [facility]

## 2023-10-02 ENCOUNTER — APPOINTMENT (OUTPATIENT)
Dept: ORTHOPEDIC SURGERY | Facility: CLINIC | Age: 88
End: 2023-10-02

## 2023-10-02 PROCEDURE — 99232 SBSQ HOSP IP/OBS MODERATE 35: CPT

## 2023-10-02 RX ADMIN — Medication 650 MILLIGRAM(S): at 10:00

## 2023-10-02 RX ADMIN — Medication 81 MILLIGRAM(S): at 10:01

## 2023-10-02 RX ADMIN — AMLODIPINE BESYLATE 2.5 MILLIGRAM(S): 2.5 TABLET ORAL at 10:01

## 2023-10-02 RX ADMIN — QUETIAPINE FUMARATE 12.5 MILLIGRAM(S): 200 TABLET, FILM COATED ORAL at 02:40

## 2023-10-02 RX ADMIN — Medication 3 MILLIGRAM(S): at 21:45

## 2023-10-02 RX ADMIN — Medication 300 MILLIGRAM(S): at 10:13

## 2023-10-02 RX ADMIN — RISPERIDONE 0.25 MILLIGRAM(S): 4 TABLET ORAL at 21:45

## 2023-10-02 RX ADMIN — Medication 650 MILLIGRAM(S): at 22:45

## 2023-10-02 RX ADMIN — QUETIAPINE FUMARATE 12.5 MILLIGRAM(S): 200 TABLET, FILM COATED ORAL at 21:45

## 2023-10-02 RX ADMIN — QUETIAPINE FUMARATE 12.5 MILLIGRAM(S): 200 TABLET, FILM COATED ORAL at 10:46

## 2023-10-02 RX ADMIN — ENOXAPARIN SODIUM 40 MILLIGRAM(S): 100 INJECTION SUBCUTANEOUS at 12:03

## 2023-10-02 RX ADMIN — Medication 650 MILLIGRAM(S): at 03:30

## 2023-10-02 RX ADMIN — Medication 650 MILLIGRAM(S): at 02:40

## 2023-10-02 RX ADMIN — Medication 650 MILLIGRAM(S): at 11:00

## 2023-10-02 RX ADMIN — Medication 650 MILLIGRAM(S): at 21:45

## 2023-10-02 RX ADMIN — RISPERIDONE 0.25 MILLIGRAM(S): 4 TABLET ORAL at 10:01

## 2023-10-02 RX ADMIN — CLOTRIMAZOLE AND BETAMETHASONE DIPROPIONATE 1 APPLICATION(S): 10; .5 CREAM TOPICAL at 10:05

## 2023-10-02 NOTE — PROGRESS NOTE ADULT - ASSESSMENT
* Toxic metab encephalopathy- appears to be resolved   suspect baseline dementia based on CT findings and as per daughter this clear worsening dementia   Psych consult  - haldol as needed for agitation   - started on Risperdal and Lorazepam as need for agitation  - Seroquel PRN     * Recent right shoulder surgery  - pain meds as needed  - Ortho consult appreciated  - outpatient f/u with Ortho       * HTN resume Norvasc    daughter Peg [phone]    Case d/w team on IDR. Patient is medically cleared for DC- waiting placement.

## 2023-10-02 NOTE — PROGRESS NOTE ADULT - SUBJECTIVE AND OBJECTIVE BOX
88 y/o male w/ a PMHx of gout, hematuria, HTN, renal stones, OA, and skin CA presents to the ED via EMS for agitation. Pt was d/c from  ED his morning and was talking back to [facility] where staff requested to send pt back to the ED d/t behavioral misconduct. Pt calm and cooperative in ED. Pt denies having any acting out episodes. No other complaints at this time. Per facility, pt was throwing things at facility and was becoming hostile at the staff and other members, witnessed by staff supervisor and pt daughter. Facility also reports that pt had his right arm sling fully off at one point.  Adm for sudden change MS, combative now resolved. Non focal exam. He was recently hospitalized for right shoulder ortho intervention.  Hospital course complicated with aggression necessitating "code shanon"    10/2- patient was seen and examined. ambulating- denies pain, fever or chills.       Vital Signs Last 24 Hrs  T(C): 36.3 (02 Oct 2023 08:41), Max: 36.5 (01 Oct 2023 19:55)  T(F): 97.3 (02 Oct 2023 08:41), Max: 97.7 (01 Oct 2023 19:55)  HR: 87 (02 Oct 2023 10:00) (82 - 100)  BP: 133/90 (02 Oct 2023 10:00) (115/70 - 133/90)  BP(mean): --  RR: 19 (02 Oct 2023 08:41) (18 - 19)  SpO2: 99% (02 Oct 2023 08:41) (99% - 100%)    Parameters below as of 02 Oct 2023 08:41  Patient On (Oxygen Delivery Method): room air    ROS:   All 10 systems reviewed and found to be negative with the exception of what has been described above.     PE:  Constitutional: NAD, laying in bed  HEENT: NC/AT  Back: no tenderness  Respiratory: respirations even and non labored, LCTA  Cardiovascular: S1S2 regular, no murmurs  Abdomen: soft, not tender, not distended, positive BS  Genitourinary: voiding  Rectal: deferred  Musculoskeletal: no muscle tenderness, no joint swelling or tenderness  Extremities: no pedal edema -right arm sling   Neurological: no focal deficits    Meds/Labs-reviewed.

## 2023-10-03 LAB
ERYTHROCYTE [SEDIMENTATION RATE] IN BLOOD: 67 MM/HR — HIGH (ref 0–20)
URATE SERPL-MCNC: 3.7 MG/DL — SIGNIFICANT CHANGE UP (ref 3.4–8.8)

## 2023-10-03 PROCEDURE — 99232 SBSQ HOSP IP/OBS MODERATE 35: CPT

## 2023-10-03 PROCEDURE — 99223 1ST HOSP IP/OBS HIGH 75: CPT

## 2023-10-03 PROCEDURE — 73120 X-RAY EXAM OF HAND: CPT | Mod: 26,LT

## 2023-10-03 RX ADMIN — Medication 3 MILLIGRAM(S): at 21:09

## 2023-10-03 RX ADMIN — ENOXAPARIN SODIUM 40 MILLIGRAM(S): 100 INJECTION SUBCUTANEOUS at 11:47

## 2023-10-03 RX ADMIN — AMLODIPINE BESYLATE 2.5 MILLIGRAM(S): 2.5 TABLET ORAL at 09:21

## 2023-10-03 RX ADMIN — RISPERIDONE 0.25 MILLIGRAM(S): 4 TABLET ORAL at 21:09

## 2023-10-03 RX ADMIN — Medication 650 MILLIGRAM(S): at 05:16

## 2023-10-03 RX ADMIN — CLOTRIMAZOLE AND BETAMETHASONE DIPROPIONATE 1 APPLICATION(S): 10; .5 CREAM TOPICAL at 17:48

## 2023-10-03 RX ADMIN — RISPERIDONE 0.25 MILLIGRAM(S): 4 TABLET ORAL at 09:21

## 2023-10-03 RX ADMIN — Medication 650 MILLIGRAM(S): at 21:30

## 2023-10-03 RX ADMIN — Medication 650 MILLIGRAM(S): at 13:30

## 2023-10-03 RX ADMIN — Medication 81 MILLIGRAM(S): at 09:23

## 2023-10-03 RX ADMIN — Medication 650 MILLIGRAM(S): at 20:36

## 2023-10-03 RX ADMIN — Medication 650 MILLIGRAM(S): at 06:16

## 2023-10-03 RX ADMIN — Medication 300 MILLIGRAM(S): at 09:20

## 2023-10-03 RX ADMIN — CLOTRIMAZOLE AND BETAMETHASONE DIPROPIONATE 1 APPLICATION(S): 10; .5 CREAM TOPICAL at 05:13

## 2023-10-03 RX ADMIN — Medication 650 MILLIGRAM(S): at 14:10

## 2023-10-03 NOTE — CONSULT NOTE ADULT - SUBJECTIVE AND OBJECTIVE BOX
CC: 87 y old  Male who presents with a chief complaint of AMS      HPI:  86 y/o M w PMHx of gout, HTN, renal stones, OA, skin CA presents to the ED on 9/23 via EMS for agitation. Pt was d/c from  in the morning of 9/23 to [facility], once he reached there, he was sent back immediately for behavioral misconduct. Patient has been admitted since, he has been having episodes of impulsivity, occasional behavioral changes, he has been seen by behavioral health specialist, per history prior to the admission patient had been having some paranoid thinking and visual hallucinations as well.    Neurology consulted to evaluate for dementia, at the time of obtaining history, patient's son is present by his bedside, he does admit that patient has had evolving mild cognitive changes with some behavioral issues, he had consulted a neurologist over a year ago, once he went for testing he refused to complete the test and walked out, he has not since followed up with any specialist.    Upon questioning the patient, he is cooperative, but seems hyperactive, gets irritable very easily, his right arm is in sling, he reports he had it surgically repaired, but is unable to recall who the surgeon was and how the injury occurred.  At present, patient denies any visual blurring, double vision, he denies visual hallucinations, vivid dreams or poor sleep pattern.  As per the staff he is difficult to manage as he does not comply and follow instructions       PAST MEDICAL & SURGICAL HISTORY:  Nephrolithiasis  Osteoarthritis  Osteoarthritis of right shoulder  Kidney stone  Hematuria  Skin cancer  HTN (hypertension)  Gout  S/P ORIF (open reduction internal fixation) fractur  RLE/ jaw  H/O lithotripsy  H/O right inguinal hernia repair  History of total bilateral knee replacement  History of removal of retained hardware  S/P tonsillectomy  S/P hip replacement, left        FAMILY HISTORY:  Family history of breast cancer (Mother, Sibling)      Social Hx:  Nonsmoker, no drug or alcohol use      MEDICATIONS  (STANDING):  allopurinol 300 milliGRAM(s) Oral daily  amLODIPine   Tablet 2.5 milliGRAM(s) Oral daily  aspirin enteric coated 81 milliGRAM(s) Oral daily  clotrimazole/betamethasone Cream 1 Application(s) Topical two times a day  enoxaparin Injectable 40 milliGRAM(s) SubCutaneous every 24 hours  influenza  Vaccine (HIGH DOSE) 0.7 milliLiter(s) IntraMuscular once  risperiDONE   Tablet 0.25 milliGRAM(s) Oral two times a day       Allergies    Celebrex (Unknown)  lactose (Unknown)  tramadol (Unknown)    Intolerances      ROS: Pertinent positives in HPI, all other ROS were reviewed and are negative.        Vital Signs Last 24 Hrs  T(C): 37.3 (03 Oct 2023 16:45), Max: 37.3 (03 Oct 2023 16:45)  T(F): 99.1 (03 Oct 2023 16:45), Max: 99.1 (03 Oct 2023 16:45)  HR: 97 (03 Oct 2023 16:45) (95 - 97)  BP: 120/78 (03 Oct 2023 16:45) (118/74 - 128/77)  BP(mean): --  RR: 18 (03 Oct 2023 16:45) (18 - 18)  SpO2: 98% (03 Oct 2023 16:45) (97% - 100%)    Parameters below as of 03 Oct 2023 16:45  Patient On (Oxygen Delivery Method): room air      Gen exam:  Normocephalic, in no distress, awake and alert.  HEENT: PERRLA, EOMI,   Neck: Supple.  Respiratory: Breath sounds are clear bilaterally  Cardiovascular: S1 and S2, regular   Extremities:  no edema  Vascular: Caritid Bruit - no  Musculoskeletal:  Right arm in sling, left hand severe swelling / arthritis  Skin: No rashes      Neurological exam:  HF: A x O x self, hosp, month, year, able to do serial 7's, can draw a clock precisely, short term recall poor, Mood slightly impulsive / hyperactive/ restless,  Speech fluent, No Aphasia or paraphasic errors. Naming /repetition intact   CN: MARTINE, EOMI, VFF, facial sensation normal, no NLFD, tongue midline, Palate moves equally,   Motor: No L pronator drift, Right arm in sling, other 3 ext 5/5, no tremor, rigidity   Sens: Intact to light touch   Reflexes: Symmetric and normal , downgoing toes b/l  Coord:  No FNFA left  Gait/Balance: Normal      Radiology:  < from: CT Head No Cont (09.23.23 @ 20:23) >  FINDINGS: The CT examination demonstrates generalized volume loss. There   is no midline shift or extra axial collections. The gray white   differentiation appears within normal limits. There is no intracranial   hemorrhage or acute transcortical infarct. There are patchy areas of   hypodensity within the periventricular white matter which may represent   the sequela of small vessel ischemic disease.    The bony windows demonstrates no fractures. The lenses are absent   secondary to prior cataract surgery. The visualized paranasal sinuses are   within normal limits. The mastoid air cells are well aerated.    IMPRESSION: No intracranial hemorrhage or acute transcortical infarct.   Generalized volume loss with small vessel ischemic disease.      
Pt is an 87M s/p recent R rTSA with Dr. Macias 9/20/23 here for delirium from his nursing home. Per daughter, NH staff, pt was seen being violent and agitated w staff. Pt was admitted for further care. Unable to assess ROS 2/2 dementia.    T(C): 37.4 (09-25-23 @ 20:58), Max: 37.4 (09-25-23 @ 20:58)  T(F): 99.3 (09-25-23 @ 20:58), Max: 99.3 (09-25-23 @ 20:58)  HR: 93 (09-25-23 @ 20:58) (84 - 97)  BP: 135/79 (09-25-23 @ 20:58) (103/76 - 139/73)  RR: 17 (09-25-23 @ 20:58) (17 - 18)  SpO2: 96% (09-25-23 @ 20:58) (96% - 97%)    RUE:  Skin: No erythema, edema or gross lesions noted. Dressings c/d/i  Shamika-incisional TTP, otherwise NTTP  SILT C5-T1  Motor: +AIN/PIN/U/Ax  Compartments soft and compressible  2+ RP                          11.1   6.95  )-----------( 305      ( 25 Sep 2023 07:21 )             34.3   09-25    144  |  111<H>  |  14  ----------------------------<  98  3.9   |  28  |  0.73    Ca    9.3      25 Sep 2023 07:21    Imaging:    R shoulder demonstrates prior rTSA in good alignment. No obvious fx or dislocation noted    A/p:  Pt is an 87M s/p R rTSA 9/20/23 w Dr. Macias here for delirium  NWB RUE In sling/PT/OT  Pain regimen PRN  DVT ppx: per primary team  Dispo per PT  Psych recs appreciated  No further recs at this time, ortho signing off  Please follow up outpt for further care in 1-2 weeks  Plan discussed w attending, who is in agreement with the above

## 2023-10-03 NOTE — CONSULT NOTE ADULT - ASSESSMENT
86 y/o M w PMHx of gout, HTN, renal stones, OA, skin CA presents to the ED on 9/23 via EMS for agitation. Pt had been admitted to [facility], underwent right shoulder surgery, was discharged to look to [facility] on 9/23/23, once he reached there he was sent back for behavioral misconduct. Patient has had evolving cognitive impairment with behavioral changes, as per his son, he had seen a neurologist a year ago, however did not want to have cognitive testing and walked out, he has been difficult to manage and he does not follow instructions and comply, Brief Mini-Mental status done today reveals fairly good orientation, is able to draw a clock with time precisely, can do serial sevens, he has deficits in fluency and recall    # Possibility of dementia with mild behavioral changes; frontotemporal dementia is a possibility.    - I would recommend obtaining MRI of the brain  - Obtain B12 TSH and RPR levels  - Patient will need full cognitive testing as outpatient, this was discussed with him as well as with his son.  - Continue management of behavioral disorder with  specialist    Above JOHN/W MAYNOR Tinsley NP

## 2023-10-03 NOTE — PROGRESS NOTE ADULT - ASSESSMENT
* Toxic metab encephalopathy- appears to be resolved   suspect baseline dementia based on CT findings and as per daughter this clear worsening dementia   Psych consult  - haldol as needed for agitation   - started on Risperdal and Lorazepam as need for agitation  - Seroquel PRN     * Recent right shoulder surgery  - pain meds as needed  - Ortho consult appreciated  - outpatient f/u with Ortho       * HTN resume Norvasc    daughter Peg [phone]    Case d/w team on IDR. Patient is medically cleared for DC- waiting placement.

## 2023-10-03 NOTE — PROGRESS NOTE ADULT - SUBJECTIVE AND OBJECTIVE BOX
86 y/o male w/ a PMHx of gout, hematuria, HTN, renal stones, OA, and skin CA presents to the ED via EMS for agitation. Pt was d/c from  ED his morning and was talking back to [facility] where staff requested to send pt back to the ED d/t behavioral misconduct. Pt calm and cooperative in ED. Pt denies having any acting out episodes. No other complaints at this time. Per facility, pt was throwing things at facility and was becoming hostile at the staff and other members, witnessed by staff supervisor and pt daughter. Facility also reports that pt had his right arm sling fully off at one point.  Adm for sudden change MS, combative now resolved. Non focal exam. He was recently hospitalized for right shoulder ortho intervention.  Hospital course complicated with aggression necessitating "code shanon"    10/2- patient was seen and examined. ambulating- denies pain, fever or chills.   10/3- ambulating with no difficulty- no acute overnight events. VSS       Vital Signs Last 24 Hrs  T(C): 36.5 (03 Oct 2023 08:04), Max: 36.7 (02 Oct 2023 21:40)  T(F): 97.7 (03 Oct 2023 08:04), Max: 98.1 (02 Oct 2023 21:40)  HR: 97 (03 Oct 2023 08:04) (92 - 97)  BP: 118/74 (03 Oct 2023 09:16) (118/74 - 128/77)  BP(mean): --  RR: 18 (03 Oct 2023 08:04) (18 - 18)  SpO2: 97% (03 Oct 2023 08:04) (97% - 100%)    Parameters below as of 03 Oct 2023 08:04  Patient On (Oxygen Delivery Method): room air        ROS:   All 10 systems reviewed and found to be negative with the exception of what has been described above.     PE:  Constitutional: NAD, laying in bed  HEENT: NC/AT  Back: no tenderness  Respiratory: respirations even and non labored, LCTA  Cardiovascular: S1S2 regular, no murmurs  Abdomen: soft, not tender, not distended, positive BS  Genitourinary: voiding  Rectal: deferred  Musculoskeletal: no muscle tenderness, no joint swelling or tenderness  Extremities: no pedal edema -right arm sling   Neurological: no focal deficits    Meds/Labs-reviewed.

## 2023-10-04 LAB
CRP SERPL-MCNC: 86 MG/L — HIGH
RHEUMATOID FACT SERPL-ACNC: 11 IU/ML — SIGNIFICANT CHANGE UP (ref 0–13)
TSH SERPL-MCNC: 2.76 UU/ML — SIGNIFICANT CHANGE UP (ref 0.34–4.82)
VIT B12 SERPL-MCNC: 543 PG/ML — SIGNIFICANT CHANGE UP (ref 232–1245)

## 2023-10-04 PROCEDURE — 99232 SBSQ HOSP IP/OBS MODERATE 35: CPT

## 2023-10-04 PROCEDURE — 70551 MRI BRAIN STEM W/O DYE: CPT | Mod: 26

## 2023-10-04 RX ORDER — COLCHICINE 0.6 MG
1.2 TABLET ORAL ONCE
Refills: 0 | Status: COMPLETED | OUTPATIENT
Start: 2023-10-04 | End: 2023-10-04

## 2023-10-04 RX ORDER — COLCHICINE 0.6 MG
0.6 TABLET ORAL DAILY
Refills: 0 | Status: DISCONTINUED | OUTPATIENT
Start: 2023-10-04 | End: 2023-10-05

## 2023-10-04 RX ADMIN — Medication 3 MILLIGRAM(S): at 21:39

## 2023-10-04 RX ADMIN — Medication 650 MILLIGRAM(S): at 03:13

## 2023-10-04 RX ADMIN — Medication 650 MILLIGRAM(S): at 22:35

## 2023-10-04 RX ADMIN — HALOPERIDOL DECANOATE 1 MILLIGRAM(S): 100 INJECTION INTRAMUSCULAR at 07:52

## 2023-10-04 RX ADMIN — QUETIAPINE FUMARATE 12.5 MILLIGRAM(S): 200 TABLET, FILM COATED ORAL at 21:40

## 2023-10-04 RX ADMIN — ENOXAPARIN SODIUM 40 MILLIGRAM(S): 100 INJECTION SUBCUTANEOUS at 11:18

## 2023-10-04 RX ADMIN — RISPERIDONE 0.25 MILLIGRAM(S): 4 TABLET ORAL at 21:40

## 2023-10-04 RX ADMIN — Medication 0.25 MILLIGRAM(S): at 15:25

## 2023-10-04 RX ADMIN — Medication 1.2 MILLIGRAM(S): at 11:53

## 2023-10-04 RX ADMIN — Medication 650 MILLIGRAM(S): at 11:57

## 2023-10-04 RX ADMIN — AMLODIPINE BESYLATE 2.5 MILLIGRAM(S): 2.5 TABLET ORAL at 09:13

## 2023-10-04 RX ADMIN — Medication 650 MILLIGRAM(S): at 12:57

## 2023-10-04 RX ADMIN — RISPERIDONE 0.25 MILLIGRAM(S): 4 TABLET ORAL at 09:13

## 2023-10-04 RX ADMIN — Medication 650 MILLIGRAM(S): at 04:15

## 2023-10-04 RX ADMIN — Medication 0.6 MILLIGRAM(S): at 13:02

## 2023-10-04 RX ADMIN — Medication 650 MILLIGRAM(S): at 21:46

## 2023-10-04 RX ADMIN — Medication 300 MILLIGRAM(S): at 09:13

## 2023-10-04 RX ADMIN — Medication 30 MILLIGRAM(S): at 11:53

## 2023-10-04 RX ADMIN — QUETIAPINE FUMARATE 12.5 MILLIGRAM(S): 200 TABLET, FILM COATED ORAL at 02:27

## 2023-10-04 RX ADMIN — Medication 81 MILLIGRAM(S): at 09:13

## 2023-10-04 NOTE — PROGRESS NOTE ADULT - SUBJECTIVE AND OBJECTIVE BOX
88 y/o male w/ a PMHx of gout, hematuria, HTN, renal stones, OA, and skin CA presents to the ED via EMS for agitation. Pt was d/c from  ED his morning and was talking back to [facility] where staff requested to send pt back to the ED d/t behavioral misconduct. Pt calm and cooperative in ED. Pt denies having any acting out episodes. No other complaints at this time. Per facility, pt was throwing things at facility and was becoming hostile at the staff and other members, witnessed by staff supervisor and pt daughter. Facility also reports that pt had his right arm sling fully off at one point.  Adm for sudden change MS, combative now resolved. Non focal exam. He was recently hospitalized for right shoulder ortho intervention.  Hospital course complicated with aggression necessitating "code shanon"    10/2- patient was seen and examined. ambulating- denies pain, fever or chills.   10/3- ambulating with no difficulty- no acute overnight events. VSS   10/4- complaining of left hand pain, very swollen. denies sob, ambulating - denies chest pain.       Vital Signs Last 24 Hrs  T(C): 36.8 (04 Oct 2023 08:30), Max: 37.3 (03 Oct 2023 16:45)  T(F): 98.2 (04 Oct 2023 08:30), Max: 99.1 (03 Oct 2023 16:45)  HR: 102 (04 Oct 2023 08:30) (97 - 102)  BP: 112/75 (04 Oct 2023 08:30) (112/75 - 137/76)  BP(mean): --  RR: 18 (04 Oct 2023 08:30) (18 - 18)  SpO2: 100% (04 Oct 2023 08:30) (98% - 100%)    Parameters below as of 04 Oct 2023 08:30  Patient On (Oxygen Delivery Method): room air        ROS:   All 10 systems reviewed and found to be negative with the exception of what has been described above.     PE:  Constitutional: NAD, laying in bed  HEENT: NC/AT  Back: no tenderness  Respiratory: respirations even and non labored, LCTA  Cardiovascular: S1S2 regular, no murmurs  Abdomen: soft, not tender, not distended, positive BS  Genitourinary: voiding  Rectal: deferred  Musculoskeletal: no muscle tenderness, no joint swelling or tenderness  Extremities: no pedal edema -right arm sling / left hand edema. 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

## 2023-10-04 NOTE — CHART NOTE - NSCHARTNOTEFT_GEN_A_CORE
CODE CHIANG overnight     Plan   -IM Haldol   -IV Ativan   -Aspiration, falls precautions
Patient seen and examined by Dr. Macias at bedside. Right shoulder dressing removed. No dressing required. POD14. OK for passive shoulder motion anterior plane only (forward flexion). Please encourage active / passive ROM of elbow, wrist, fingers.   Stable for dc from orthopaedic perspective  Follow up w/ Dr. Macias 2 weeks after discharge
60

## 2023-10-04 NOTE — PROGRESS NOTE ADULT - ASSESSMENT
* Toxic metab encephalopathy- appears to be resolved   suspect baseline dementia based on CT findings and as per daughter this clear worsening dementia   Psych consult  - haldol as needed for agitation   - started on Risperdal and Lorazepam as need for agitation  - Seroquel PRN   - MRI pending  - neurology consult apprecaited    *acute gout flare  - left 5th finger with tophi  - uric acid level normal   - start colchicine and prednisone     * Recent right shoulder surgery  - pain meds as needed  - Ortho consult appreciated  - outpatient f/u with Ortho     * HTN resume Norvasc    daughter Peg [phone]    Case d/w team on IDR. Patient is medically cleared for DC- waiting placement.

## 2023-10-05 ENCOUNTER — TRANSCRIPTION ENCOUNTER (OUTPATIENT)
Age: 88
End: 2023-10-05

## 2023-10-05 VITALS
RESPIRATION RATE: 18 BRPM | TEMPERATURE: 98 F | DIASTOLIC BLOOD PRESSURE: 75 MMHG | OXYGEN SATURATION: 100 % | HEART RATE: 78 BPM | SYSTOLIC BLOOD PRESSURE: 124 MMHG

## 2023-10-05 PROCEDURE — 99239 HOSP IP/OBS DSCHRG MGMT >30: CPT

## 2023-10-05 RX ORDER — ALLOPURINOL 300 MG
1 TABLET ORAL
Refills: 0 | DISCHARGE

## 2023-10-05 RX ORDER — RISPERIDONE 4 MG/1
1 TABLET ORAL
Qty: 0 | Refills: 0 | DISCHARGE
Start: 2023-10-05

## 2023-10-05 RX ORDER — ACETAMINOPHEN 500 MG
2 TABLET ORAL
Qty: 0 | Refills: 0 | DISCHARGE
Start: 2023-10-05

## 2023-10-05 RX ORDER — AMLODIPINE BESYLATE 2.5 MG/1
1 TABLET ORAL
Refills: 0 | DISCHARGE

## 2023-10-05 RX ORDER — PANTOPRAZOLE SODIUM 20 MG/1
1 TABLET, DELAYED RELEASE ORAL
Qty: 0 | Refills: 0 | DISCHARGE

## 2023-10-05 RX ORDER — COLCHICINE 0.6 MG
1 TABLET ORAL
Qty: 0 | Refills: 0 | DISCHARGE
Start: 2023-10-05

## 2023-10-05 RX ORDER — QUETIAPINE FUMARATE 200 MG/1
12.5 TABLET, FILM COATED ORAL
Qty: 0 | Refills: 0 | DISCHARGE
Start: 2023-10-05

## 2023-10-05 RX ORDER — LANOLIN ALCOHOL/MO/W.PET/CERES
1 CREAM (GRAM) TOPICAL
Qty: 0 | Refills: 0 | DISCHARGE
Start: 2023-10-05

## 2023-10-05 RX ORDER — CLOTRIMAZOLE AND BETAMETHASONE DIPROPIONATE 10; .5 MG/G; MG/G
1 CREAM TOPICAL
Qty: 0 | Refills: 0 | DISCHARGE
Start: 2023-10-05

## 2023-10-05 RX ORDER — AMLODIPINE BESYLATE 2.5 MG/1
1 TABLET ORAL
Qty: 0 | Refills: 0 | DISCHARGE
Start: 2023-10-05

## 2023-10-05 RX ADMIN — Medication 30 MILLIGRAM(S): at 09:14

## 2023-10-05 RX ADMIN — RISPERIDONE 0.25 MILLIGRAM(S): 4 TABLET ORAL at 09:14

## 2023-10-05 RX ADMIN — AMLODIPINE BESYLATE 2.5 MILLIGRAM(S): 2.5 TABLET ORAL at 09:14

## 2023-10-05 RX ADMIN — Medication 81 MILLIGRAM(S): at 09:14

## 2023-10-05 NOTE — DISCHARGE NOTE NURSING/CASE MANAGEMENT/SOCIAL WORK - PATIENT PORTAL LINK FT
You can access the FollowMyHealth Patient Portal offered by [facility] by registering at the following website: [email or web address] By joining [email or web address] FollowMyHealth portal, you will also be able to view your health information using other applications (apps) compatible with our system.

## 2023-10-05 NOTE — PROGRESS NOTE ADULT - SUBJECTIVE AND OBJECTIVE BOX
86 y/o male w/ a PMHx of gout, hematuria, HTN, renal stones, OA, and skin CA presents to the ED via EMS for agitation. Pt was d/c from  ED his morning and was talking back to [facility] where staff requested to send pt back to the ED d/t behavioral misconduct. Pt calm and cooperative in ED. Pt denies having any acting out episodes. No other complaints at this time. Per facility, pt was throwing things at facility and was becoming hostile at the staff and other members, witnessed by staff supervisor and pt daughter. Facility also reports that pt had his right arm sling fully off at one point.  Adm for sudden change MS, combative now resolved. Non focal exam. He was recently hospitalized for right shoulder ortho intervention.  Hospital course complicated with aggression necessitating "code shanon"    10/5- patient was seen and examined. ambulating- cooperative. left hand swelling resolving.    Vital Signs Last 24 Hrs  T(C): 36.4 (05 Oct 2023 08:39), Max: 36.8 (04 Oct 2023 15:52)  T(F): 97.5 (05 Oct 2023 08:39), Max: 98.2 (04 Oct 2023 15:52)  HR: 78 (05 Oct 2023 08:39) (78 - 92)  BP: 124/75 (05 Oct 2023 08:39) (108/73 - 135/73)  BP(mean): --  RR: 18 (05 Oct 2023 08:39) (18 - 18)  SpO2: 100% (05 Oct 2023 08:39) (100% - 100%)    Parameters below as of 05 Oct 2023 08:39  Patient On (Oxygen Delivery Method): room air    ROS:   All 10 systems reviewed and found to be negative with the exception of what has been described above.       PE:  Constitutional: NAD, laying in bed  HEENT: NC/AT  Back: no tenderness  Respiratory: respirations even and non labored, LCTA  Cardiovascular: S1S2 regular, no murmurs  Abdomen: soft, not tender, not distended, positive BS  Genitourinary: voiding  Rectal: deferred  Musculoskeletal: no muscle tenderness, no joint swelling or tenderness  Extremities: no pedal edema -right arm sling / left hand edema. left 5th finger with tophi - IMPROVING   Neurological: no focal deficits    labs/meds reviewed

## 2023-10-05 NOTE — PROGRESS NOTE ADULT - ASSESSMENT
PLAN  * Toxic metab encephalopathy- appears to be resolved   suspect baseline dementia based on CT findings and as per daughter this clear worsening dementia   Psych consult  -started on risperdal and Lorazepam as need for agitation  - has not needed PRN lorazepam in the past 24 hours.   - will need outpatient Psych f/u  - outpatient neuro for cognitive testing  - MRI results noted        * Recent right shoulder surgery  pain meds as needed  Ortho consult appreciated  outpatient f/u with Ortho     *gout- left hand  - prednisone x3 days  - colchichine x2 days  - f/u with PCP when allopurinal should be restarted         * HTN resume Norvasc    daughter Tristen [phone]  Plan d/w daughter tristen- as per daughter patient has been non compliant with meds/treatment regime. Patient was seen by Psychiatrist as an outpatient and was prescribed psychotropic meds- but patient only took one dose and did not comply. She feels that patient has been more depressed since the passing of his spouse 2 years ago.   son [phone]

## 2023-10-05 NOTE — DISCHARGE NOTE NURSING/CASE MANAGEMENT/SOCIAL WORK - NSDCPEFALRISK_GEN_ALL_CORE
For information on Fall & Injury Prevention, visit: [email or web address] OR  [email or web address] OR  https://www.cdc.gov/steadi/patient.html

## 2023-10-09 DIAGNOSIS — F03.911 UNSPECIFIED DEMENTIA, UNSPECIFIED SEVERITY, WITH AGITATION: ICD-10-CM

## 2023-10-09 DIAGNOSIS — G92.8 OTHER TOXIC ENCEPHALOPATHY: ICD-10-CM

## 2023-10-09 DIAGNOSIS — Z85.828 PERSONAL HISTORY OF OTHER MALIGNANT NEOPLASM OF SKIN: ICD-10-CM

## 2023-10-09 DIAGNOSIS — M10.9 GOUT, UNSPECIFIED: ICD-10-CM

## 2023-10-09 DIAGNOSIS — Z96.653 PRESENCE OF ARTIFICIAL KNEE JOINT, BILATERAL: ICD-10-CM

## 2023-10-09 DIAGNOSIS — Z88.8 ALLERGY STATUS TO OTHER DRUGS, MEDICAMENTS AND BIOLOGICAL SUBSTANCES: ICD-10-CM

## 2023-10-09 DIAGNOSIS — I10 ESSENTIAL (PRIMARY) HYPERTENSION: ICD-10-CM

## 2023-10-09 DIAGNOSIS — Z91.199 PATIENT'S NONCOMPLIANCE WITH OTHER MEDICAL TREATMENT AND REGIMEN DUE TO UNSPECIFIED REASON: ICD-10-CM

## 2023-10-09 DIAGNOSIS — Z98.890 OTHER SPECIFIED POSTPROCEDURAL STATES: ICD-10-CM

## 2023-11-01 ENCOUNTER — APPOINTMENT (OUTPATIENT)
Dept: ORTHOPEDIC SURGERY | Facility: CLINIC | Age: 88
End: 2023-11-01

## 2023-11-09 ENCOUNTER — APPOINTMENT (OUTPATIENT)
Dept: ORTHOPEDIC SURGERY | Facility: CLINIC | Age: 88
End: 2023-11-09
Payer: MEDICARE

## 2023-11-09 VITALS — HEIGHT: 72 IN | WEIGHT: 195 LBS | BODY MASS INDEX: 26.41 KG/M2

## 2023-11-09 PROCEDURE — 73030 X-RAY EXAM OF SHOULDER: CPT | Mod: RT

## 2023-11-09 PROCEDURE — 99024 POSTOP FOLLOW-UP VISIT: CPT

## 2023-12-14 ENCOUNTER — APPOINTMENT (OUTPATIENT)
Dept: ORTHOPEDIC SURGERY | Facility: CLINIC | Age: 88
End: 2023-12-14

## 2023-12-20 ENCOUNTER — APPOINTMENT (OUTPATIENT)
Dept: ORTHOPEDIC SURGERY | Facility: CLINIC | Age: 88
End: 2023-12-20

## 2024-01-25 ENCOUNTER — APPOINTMENT (OUTPATIENT)
Dept: ORTHOPEDIC SURGERY | Facility: CLINIC | Age: 89
End: 2024-01-25
Payer: MEDICARE

## 2024-01-25 VITALS — HEIGHT: 72 IN | WEIGHT: 195 LBS | BODY MASS INDEX: 26.41 KG/M2

## 2024-01-25 PROCEDURE — 99213 OFFICE O/P EST LOW 20 MIN: CPT

## 2024-01-26 NOTE — HISTORY OF PRESENT ILLNESS
[de-identified] : SPO Reverse total replacement of right shoulder joint  DOS 9/20/23 [de-identified] : Patient is here today for 1st post operative visit.  SPO Reverse total replacement of right shoulder joint  DOS 9/20/23 He denies fever or chills, redness around or near the incision site(s), numbness/tingling. He denies nausea/ vomiting and admits to their appetite since their surgery being back to normal. Normal bowel habits at this time. Patient has not started physical therapy. Patient presents today doing well with his daughter. He has been at assisted living. Patient since their surgery has utilized tylenol as their primary pain control with relief in their symptoms. They no longer require narcotics for pain control. [de-identified] : Incisions are clean, dry and intact. No surrounding erythema. No drainage. Wound appears to be healing well. External rotation at 45 degrees, Forward flexion at 110 and passive forward flexion at 130.  Motor and sensation are intact distally. He has full range of motion of all fingers with capillary refill of <2 seconds throughout. He has good nerve function and median nerve, ulnar, radial, PIN, AIN. He has no sensory deficits over the C5-T1 nerve roots. Radial pulses 2+. Able to make an A-OK sign and thumbs up sign: able to flex/extend thumb, able to cross middle over index finger. Full ROM elbow, wrist, hand [de-identified] : The following radiographs were ordered and read by me during this patient's visit. I reviewed each radiograph in detail with the patient and discussed the findings as highlighted below. 3 views of the right shoulder were obtained today that show no fracture, dislocation. Hardware is in place and intact.  [de-identified] : I had a discussion with the patient regarding the operative and postoperative course. The patient is doing well. He should begin physical therapy. He can drive as tolerated. Patient will follow up in 2 months for repeat clinical assessment. All questions were answered and the patient verbalized understanding. The patient is in agreement with this treatment plan.

## 2024-01-26 NOTE — ADDENDUM
[FreeTextEntry1] :  Documented by Jake Galvez acting as a scribe for Dr. Macias and Asaf Bates PA-C on 01/25/2024. All medical record entries made by the Scribe were at my, Dr. Macias, and Asaf Bates's, direction and personally dictated by me on 01/25/2024. I have reviewed the chart and agree that the record accurately reflects my personal performance of the history, physical exam, procedure and imaging.

## 2024-02-19 NOTE — ED ADULT NURSE NOTE - CHIEF COMPLAINT QUOTE
BIBEMS for agitation, pt discharged this morning from  ED, was taken back to [facility] where staff requested to send pt back to ER because of behavioral misconduct. pt arrived to ED calm and not exhibiting any signs of agitation at this time. No

## 2024-03-11 ENCOUNTER — APPOINTMENT (OUTPATIENT)
Dept: ORTHOPEDIC SURGERY | Facility: CLINIC | Age: 89
End: 2024-03-11

## 2024-03-18 ENCOUNTER — APPOINTMENT (OUTPATIENT)
Dept: ORTHOPEDIC SURGERY | Facility: CLINIC | Age: 89
End: 2024-03-18
Payer: MEDICARE

## 2024-03-18 PROCEDURE — 99214 OFFICE O/P EST MOD 30 MIN: CPT

## 2024-03-18 NOTE — PHYSICAL EXAM
[de-identified] : General: Well appearing, no acute distress Neurologic: A&Ox3, No focal deficits Head: NCAT without abrasions, lacerations, or ecchymosis to head, face, or scalp Eyes: No scleral icterus, no gross abnormalities Respiratory: Equal chest wall expansion bilaterally, no accessory muscle use Lymphatic: No lymphadenopathy palpated Skin: Warm and dry Psychiatric: Normal mood and affect   Right Shoulder:  Inspection/Palpation: no tenderness, swelling or deformities  Range of Motion: no crepitus; FF 0-150; ER at side 30; IR to back pocket  Strength: forward elevation in scapular plane [5]/5, internal rotation [5]/5, external rotation [5]/5, adduction [5]/5 and abduction [5]/5  Stability: load and shift test negative, apprehension test negative, relocation test negative, sulcus sign negative, Symone test negative, Jerk test negative  Tests: Justice negative, Neer's test's test negative, drop arm test negative, bear hug test negative, [place] sign negative, cross arm adduction negative, lift off sign negative, Hornblower's sign negative, speeds test negative, Yergason's test negative, bicipital groove tenderness negative, Costa's Active Compression test negative, whipple test negative, bicep's load II test negative

## 2024-03-18 NOTE — ADDENDUM
[FreeTextEntry1] : Documented by Candi Stephen acting as a scribe for Dr. Macias on 03/18/2024. All medical record entries made by the Scribe were at my, Dr. Macias's, direction and personally dictated by me on 03/18/2024. I have reviewed the chart and agree that the record accurately reflects my personal performance of the history, physical exam, procedure and imaging.

## 2024-03-18 NOTE — DISCUSSION/SUMMARY
[de-identified] : I had a discussion with the patient regarding the operative and postoperative course. The patient is doing well. He should begin physical therapy. Patient will follow up in 2-3 months for repeat clinical assessment. All questions were answered and the patient verbalized understanding. The patient is in agreement with this treatment plan.

## 2024-03-18 NOTE — REASON FOR VISIT
[Follow-Up Visit] : a follow-up visit for [FreeTextEntry2] : SPO Reverse total replacement of right shoulder joint

## 2024-03-18 NOTE — HISTORY OF PRESENT ILLNESS
[de-identified] : DEBBIE is a 88 year male here today for SPO Reverse total replacement of right shoulder joint. DOS 9/20/23. He is doing well but has not yet started PT.

## 2024-05-02 ENCOUNTER — EMERGENCY (EMERGENCY)
Facility: HOSPITAL | Age: 89
LOS: 1 days | Discharge: OTHER INSTITUTION | End: 2024-05-02
Attending: EMERGENCY MEDICINE
Payer: MEDICARE

## 2024-05-02 VITALS
SYSTOLIC BLOOD PRESSURE: 140 MMHG | OXYGEN SATURATION: 99 % | HEART RATE: 88 BPM | DIASTOLIC BLOOD PRESSURE: 84 MMHG | TEMPERATURE: 98 F | HEIGHT: 72 IN | RESPIRATION RATE: 16 BRPM | WEIGHT: 199.96 LBS

## 2024-05-02 DIAGNOSIS — Z96.653 PRESENCE OF ARTIFICIAL KNEE JOINT, BILATERAL: Chronic | ICD-10-CM

## 2024-05-02 DIAGNOSIS — Z96.642 PRESENCE OF LEFT ARTIFICIAL HIP JOINT: Chronic | ICD-10-CM

## 2024-05-02 DIAGNOSIS — Z90.89 ACQUIRED ABSENCE OF OTHER ORGANS: Chronic | ICD-10-CM

## 2024-05-02 DIAGNOSIS — Z98.890 OTHER SPECIFIED POSTPROCEDURAL STATES: Chronic | ICD-10-CM

## 2024-05-02 LAB
ALBUMIN SERPL ELPH-MCNC: 3.6 G/DL — SIGNIFICANT CHANGE UP (ref 3.3–5.2)
ALP SERPL-CCNC: 133 U/L — HIGH (ref 40–120)
ALT FLD-CCNC: 13 U/L — SIGNIFICANT CHANGE UP
ANION GAP SERPL CALC-SCNC: 13 MMOL/L — SIGNIFICANT CHANGE UP (ref 5–17)
APTT BLD: 36.9 SEC — HIGH (ref 24.5–35.6)
AST SERPL-CCNC: 14 U/L — SIGNIFICANT CHANGE UP
BASOPHILS # BLD AUTO: 0.03 K/UL — SIGNIFICANT CHANGE UP (ref 0–0.2)
BASOPHILS NFR BLD AUTO: 0.4 % — SIGNIFICANT CHANGE UP (ref 0–2)
BILIRUB SERPL-MCNC: 0.4 MG/DL — SIGNIFICANT CHANGE UP (ref 0.4–2)
BLD GP AB SCN SERPL QL: SIGNIFICANT CHANGE UP
BUN SERPL-MCNC: 15.7 MG/DL — SIGNIFICANT CHANGE UP (ref 8–20)
CALCIUM SERPL-MCNC: 9.1 MG/DL — SIGNIFICANT CHANGE UP (ref 8.4–10.5)
CHLORIDE SERPL-SCNC: 102 MMOL/L — SIGNIFICANT CHANGE UP (ref 96–108)
CO2 SERPL-SCNC: 24 MMOL/L — SIGNIFICANT CHANGE UP (ref 22–29)
CREAT SERPL-MCNC: 0.86 MG/DL — SIGNIFICANT CHANGE UP (ref 0.5–1.3)
EGFR: 84 ML/MIN/1.73M2 — SIGNIFICANT CHANGE UP
EOSINOPHIL # BLD AUTO: 0.19 K/UL — SIGNIFICANT CHANGE UP (ref 0–0.5)
EOSINOPHIL NFR BLD AUTO: 2.4 % — SIGNIFICANT CHANGE UP (ref 0–6)
GLUCOSE SERPL-MCNC: 87 MG/DL — SIGNIFICANT CHANGE UP (ref 70–99)
HCT VFR BLD CALC: 40 % — SIGNIFICANT CHANGE UP (ref 39–50)
HGB BLD-MCNC: 13 G/DL — SIGNIFICANT CHANGE UP (ref 13–17)
IMM GRANULOCYTES NFR BLD AUTO: 0.3 % — SIGNIFICANT CHANGE UP (ref 0–0.9)
INR BLD: 0.96 RATIO — SIGNIFICANT CHANGE UP (ref 0.85–1.18)
LYMPHOCYTES # BLD AUTO: 1.84 K/UL — SIGNIFICANT CHANGE UP (ref 1–3.3)
LYMPHOCYTES # BLD AUTO: 23.6 % — SIGNIFICANT CHANGE UP (ref 13–44)
MAGNESIUM SERPL-MCNC: 1.8 MG/DL — SIGNIFICANT CHANGE UP (ref 1.6–2.6)
MCHC RBC-ENTMCNC: 28 PG — SIGNIFICANT CHANGE UP (ref 27–34)
MCHC RBC-ENTMCNC: 32.5 GM/DL — SIGNIFICANT CHANGE UP (ref 32–36)
MCV RBC AUTO: 86.2 FL — SIGNIFICANT CHANGE UP (ref 80–100)
MONOCYTES # BLD AUTO: 0.64 K/UL — SIGNIFICANT CHANGE UP (ref 0–0.9)
MONOCYTES NFR BLD AUTO: 8.2 % — SIGNIFICANT CHANGE UP (ref 2–14)
NEUTROPHILS # BLD AUTO: 5.08 K/UL — SIGNIFICANT CHANGE UP (ref 1.8–7.4)
NEUTROPHILS NFR BLD AUTO: 65.1 % — SIGNIFICANT CHANGE UP (ref 43–77)
PLATELET # BLD AUTO: 323 K/UL — SIGNIFICANT CHANGE UP (ref 150–400)
POTASSIUM SERPL-MCNC: 4.3 MMOL/L — SIGNIFICANT CHANGE UP (ref 3.5–5.3)
POTASSIUM SERPL-SCNC: 4.3 MMOL/L — SIGNIFICANT CHANGE UP (ref 3.5–5.3)
PROT SERPL-MCNC: 6.8 G/DL — SIGNIFICANT CHANGE UP (ref 6.6–8.7)
PROTHROM AB SERPL-ACNC: 10.7 SEC — SIGNIFICANT CHANGE UP (ref 9.5–13)
RBC # BLD: 4.64 M/UL — SIGNIFICANT CHANGE UP (ref 4.2–5.8)
RBC # FLD: 15.6 % — HIGH (ref 10.3–14.5)
SODIUM SERPL-SCNC: 139 MMOL/L — SIGNIFICANT CHANGE UP (ref 135–145)
WBC # BLD: 7.8 K/UL — SIGNIFICANT CHANGE UP (ref 3.8–10.5)
WBC # FLD AUTO: 7.8 K/UL — SIGNIFICANT CHANGE UP (ref 3.8–10.5)

## 2024-05-02 PROCEDURE — 99284 EMERGENCY DEPT VISIT MOD MDM: CPT

## 2024-05-02 PROCEDURE — 99285 EMERGENCY DEPT VISIT HI MDM: CPT | Mod: GC

## 2024-05-02 PROCEDURE — 99283 EMERGENCY DEPT VISIT LOW MDM: CPT

## 2024-05-02 RX ORDER — RISPERIDONE 4 MG/1
0.25 TABLET ORAL ONCE
Refills: 0 | Status: COMPLETED | OUTPATIENT
Start: 2024-05-02 | End: 2024-05-02

## 2024-05-02 RX ORDER — QUETIAPINE FUMARATE 200 MG/1
12.5 TABLET, FILM COATED ORAL ONCE
Refills: 0 | Status: COMPLETED | OUTPATIENT
Start: 2024-05-02 | End: 2024-05-02

## 2024-05-02 RX ORDER — ACETAMINOPHEN 500 MG
975 TABLET ORAL ONCE
Refills: 0 | Status: COMPLETED | OUTPATIENT
Start: 2024-05-02 | End: 2024-05-02

## 2024-05-02 RX ADMIN — Medication 975 MILLIGRAM(S): at 21:39

## 2024-05-02 RX ADMIN — RISPERIDONE 0.25 MILLIGRAM(S): 4 TABLET ORAL at 21:40

## 2024-05-02 RX ADMIN — QUETIAPINE FUMARATE 12.5 MILLIGRAM(S): 200 TABLET, FILM COATED ORAL at 21:39

## 2024-05-02 NOTE — ED PROVIDER NOTE - OBJECTIVE STATEMENT
88-year-old male history dementia, gout, HTN presents from his assisted living facility at [facility] in [place] with several days of drainage from his right anterior shoulder.  Patient states he has had such drainage since his shoulder replacement in September.  He denies associated fevers, chills, nausea, vomiting, pain, SOB. History corroborated by daughter at bedside

## 2024-05-02 NOTE — ED PROVIDER NOTE - ATTENDING CONTRIBUTION TO CARE
88-year-old male with a past medical history of dementia, gout, hypertension and past surgical history of right shoulder replacement presents with draining from right shoulder and surrounding erythema.  Concern for abscess/infection do labs CT Ortho consult patient signed out pending CT results and Ortho consult

## 2024-05-02 NOTE — ED PROVIDER NOTE - CLINICAL SUMMARY MEDICAL DECISION MAKING FREE TEXT BOX
88-year-old male presents from his assisted living facility with several days of drainage from his right anterior shoulder.  On exam, patient with clear yellow discharge from a chronic appearing wound in his right anterior shoulder.  Will evaluate for possible deep space infection versus fistula formation with CT imaging imaging of the shoulder. Will also order nighttime anxiety medications while in ED as pt with hx of behavior disturbance

## 2024-05-02 NOTE — ED PROVIDER NOTE - PHYSICAL EXAMINATION
General: Awake, alert, lying in bed in NAD  HEENT: Normocephalic, atraumatic. No scleral icterus or conjunctival injection. EOMI. Moist mucous membranes. Oropharynx clear.   Neck:. Soft and supple.  Cardiac: RRR, Peripheral pulses 2+ and symmetric. No LE edema.  Resp: Lungs CTAB. No accessory muscle use  Abd: Soft, non-tender, non-distended. No guarding, rebound, or rigidity.  Back: Spine midline and non-tender.   Skin: Right anterior shoulder with chronic appearing wound, draining clear yellow fluid. Minimal surrounding erythema  Neuro: AO x 4. Moves all extremities symmetrically. Motor strength and sensation grossly intact.  Psych: Appropriate mood and affect

## 2024-05-02 NOTE — ED ADULT TRIAGE NOTE - CHIEF COMPLAINT QUOTE
Pt BIBA from assisted living for surgical consult. Pt had right shoulder surgery 5 months ago with recurring infection and drainage.

## 2024-05-02 NOTE — ED ADULT NURSE NOTE - NSFALLUNIVINTERV_ED_ALL_ED
Bed/Stretcher in lowest position, wheels locked, appropriate side rails in place/Call bell, personal items and telephone in reach/Instruct patient to call for assistance before getting out of bed/chair/stretcher/Non-slip footwear applied when patient is off stretcher/[place] to call system/Physically safe environment - no spills, clutter or unnecessary equipment/Purposeful proactive rounding/Room/bathroom lighting operational, light cord in reach

## 2024-05-02 NOTE — ED ADULT NURSE NOTE - OBJECTIVE STATEMENT
pt A & Ox3-4 c/o b/l shoulder pain. Respirations even and unlabored. Denies chest pain or SOB. Pt reports having right shoulder pain, left shoulder pain and right 5th digit pain and swelling. IV established. Blood specimens sent to lab. Medications administered as ordered. pt daughter at bedside. pt irritable during assessment.

## 2024-05-03 ENCOUNTER — INPATIENT (INPATIENT)
Facility: HOSPITAL | Age: 89
LOS: 3 days | Discharge: NURSING, REHAB OR LONG-TERM CARE | DRG: 921 | End: 2024-05-07
Attending: ORTHOPAEDIC SURGERY | Admitting: ORTHOPAEDIC SURGERY
Payer: MEDICARE

## 2024-05-03 VITALS
OXYGEN SATURATION: 99 % | TEMPERATURE: 99 F | WEIGHT: 179.9 LBS | RESPIRATION RATE: 18 BRPM | HEIGHT: 72 IN | SYSTOLIC BLOOD PRESSURE: 147 MMHG | DIASTOLIC BLOOD PRESSURE: 97 MMHG | HEART RATE: 51 BPM

## 2024-05-03 VITALS
OXYGEN SATURATION: 97 % | TEMPERATURE: 98 F | RESPIRATION RATE: 18 BRPM | DIASTOLIC BLOOD PRESSURE: 72 MMHG | HEART RATE: 74 BPM | SYSTOLIC BLOOD PRESSURE: 164 MMHG

## 2024-05-03 DIAGNOSIS — Z96.642 PRESENCE OF LEFT ARTIFICIAL HIP JOINT: Chronic | ICD-10-CM

## 2024-05-03 DIAGNOSIS — Z98.890 OTHER SPECIFIED POSTPROCEDURAL STATES: Chronic | ICD-10-CM

## 2024-05-03 DIAGNOSIS — T81.9XXA UNSPECIFIED COMPLICATION OF PROCEDURE, INITIAL ENCOUNTER: ICD-10-CM

## 2024-05-03 DIAGNOSIS — Z96.653 PRESENCE OF ARTIFICIAL KNEE JOINT, BILATERAL: Chronic | ICD-10-CM

## 2024-05-03 DIAGNOSIS — Z90.89 ACQUIRED ABSENCE OF OTHER ORGANS: Chronic | ICD-10-CM

## 2024-05-03 LAB
ANION GAP SERPL CALC-SCNC: 3 MMOL/L — LOW (ref 5–17)
APTT BLD: 36.5 SEC — HIGH (ref 24.5–35.6)
BUN SERPL-MCNC: 14 MG/DL — SIGNIFICANT CHANGE UP (ref 7–23)
CALCIUM SERPL-MCNC: 9.6 MG/DL — SIGNIFICANT CHANGE UP (ref 8.5–10.1)
CHLORIDE SERPL-SCNC: 108 MMOL/L — SIGNIFICANT CHANGE UP (ref 96–108)
CO2 SERPL-SCNC: 29 MMOL/L — SIGNIFICANT CHANGE UP (ref 22–31)
CREAT SERPL-MCNC: 1.08 MG/DL — SIGNIFICANT CHANGE UP (ref 0.5–1.3)
EGFR: 66 ML/MIN/1.73M2 — SIGNIFICANT CHANGE UP
ERYTHROCYTE [SEDIMENTATION RATE] IN BLOOD: 36 MM/HR — HIGH (ref 0–15)
GLUCOSE SERPL-MCNC: 96 MG/DL — SIGNIFICANT CHANGE UP (ref 70–99)
HCT VFR BLD CALC: 42.4 % — SIGNIFICANT CHANGE UP (ref 39–50)
HGB BLD-MCNC: 13.5 G/DL — SIGNIFICANT CHANGE UP (ref 13–17)
INR BLD: 0.95 RATIO — SIGNIFICANT CHANGE UP (ref 0.85–1.18)
MCHC RBC-ENTMCNC: 27.9 PG — SIGNIFICANT CHANGE UP (ref 27–34)
MCHC RBC-ENTMCNC: 31.8 GM/DL — LOW (ref 32–36)
MCV RBC AUTO: 87.6 FL — SIGNIFICANT CHANGE UP (ref 80–100)
PLATELET # BLD AUTO: 340 K/UL — SIGNIFICANT CHANGE UP (ref 150–400)
POTASSIUM SERPL-MCNC: 4.1 MMOL/L — SIGNIFICANT CHANGE UP (ref 3.5–5.3)
POTASSIUM SERPL-SCNC: 4.1 MMOL/L — SIGNIFICANT CHANGE UP (ref 3.5–5.3)
PROTHROM AB SERPL-ACNC: 10.7 SEC — SIGNIFICANT CHANGE UP (ref 9.5–13)
RBC # BLD: 4.84 M/UL — SIGNIFICANT CHANGE UP (ref 4.2–5.8)
RBC # FLD: 15.7 % — HIGH (ref 10.3–14.5)
SODIUM SERPL-SCNC: 140 MMOL/L — SIGNIFICANT CHANGE UP (ref 135–145)
WBC # BLD: 7.06 K/UL — SIGNIFICANT CHANGE UP (ref 3.8–10.5)
WBC # FLD AUTO: 7.06 K/UL — SIGNIFICANT CHANGE UP (ref 3.8–10.5)

## 2024-05-03 PROCEDURE — 99285 EMERGENCY DEPT VISIT HI MDM: CPT

## 2024-05-03 PROCEDURE — 87102 FUNGUS ISOLATION CULTURE: CPT

## 2024-05-03 PROCEDURE — 36415 COLL VENOUS BLD VENIPUNCTURE: CPT

## 2024-05-03 PROCEDURE — 36573 INSJ PICC RS&I 5 YR+: CPT

## 2024-05-03 PROCEDURE — 85652 RBC SED RATE AUTOMATED: CPT

## 2024-05-03 PROCEDURE — 87077 CULTURE AEROBIC IDENTIFY: CPT

## 2024-05-03 PROCEDURE — 87075 CULTR BACTERIA EXCEPT BLOOD: CPT

## 2024-05-03 PROCEDURE — 88305 TISSUE EXAM BY PATHOLOGIST: CPT

## 2024-05-03 PROCEDURE — 97163 PT EVAL HIGH COMPLEX 45 MIN: CPT | Mod: GP

## 2024-05-03 PROCEDURE — C1776: CPT

## 2024-05-03 PROCEDURE — 80053 COMPREHEN METABOLIC PANEL: CPT

## 2024-05-03 PROCEDURE — 87040 BLOOD CULTURE FOR BACTERIA: CPT

## 2024-05-03 PROCEDURE — 97116 GAIT TRAINING THERAPY: CPT | Mod: GP

## 2024-05-03 PROCEDURE — 80202 ASSAY OF VANCOMYCIN: CPT

## 2024-05-03 PROCEDURE — 99223 1ST HOSP IP/OBS HIGH 75: CPT

## 2024-05-03 PROCEDURE — 86140 C-REACTIVE PROTEIN: CPT

## 2024-05-03 PROCEDURE — 73201 CT UPPER EXTREMITY W/DYE: CPT | Mod: 26,RT,MC

## 2024-05-03 PROCEDURE — 73223 MRI JOINT UPR EXTR W/O&W/DYE: CPT | Mod: MC,RT

## 2024-05-03 PROCEDURE — 77012 CT SCAN FOR NEEDLE BIOPSY: CPT

## 2024-05-03 PROCEDURE — 85610 PROTHROMBIN TIME: CPT

## 2024-05-03 PROCEDURE — 97535 SELF CARE MNGMENT TRAINING: CPT | Mod: GO

## 2024-05-03 PROCEDURE — C9399: CPT

## 2024-05-03 PROCEDURE — 87116 MYCOBACTERIA CULTURE: CPT

## 2024-05-03 PROCEDURE — 83735 ASSAY OF MAGNESIUM: CPT

## 2024-05-03 PROCEDURE — 73030 X-RAY EXAM OF SHOULDER: CPT | Mod: RT

## 2024-05-03 PROCEDURE — 86900 BLOOD TYPING SEROLOGIC ABO: CPT

## 2024-05-03 PROCEDURE — 71045 X-RAY EXAM CHEST 1 VIEW: CPT | Mod: 26

## 2024-05-03 PROCEDURE — 99285 EMERGENCY DEPT VISIT HI MDM: CPT | Mod: 25

## 2024-05-03 PROCEDURE — 93005 ELECTROCARDIOGRAM TRACING: CPT

## 2024-05-03 PROCEDURE — 85730 THROMBOPLASTIN TIME PARTIAL: CPT

## 2024-05-03 PROCEDURE — 96374 THER/PROPH/DIAG INJ IV PUSH: CPT | Mod: XU

## 2024-05-03 PROCEDURE — 97166 OT EVAL MOD COMPLEX 45 MIN: CPT | Mod: GO

## 2024-05-03 PROCEDURE — 87070 CULTURE OTHR SPECIMN AEROBIC: CPT

## 2024-05-03 PROCEDURE — 87015 SPECIMEN INFECT AGNT CONCNTJ: CPT

## 2024-05-03 PROCEDURE — 93010 ELECTROCARDIOGRAM REPORT: CPT

## 2024-05-03 PROCEDURE — 86901 BLOOD TYPING SEROLOGIC RH(D): CPT

## 2024-05-03 PROCEDURE — 87206 SMEAR FLUORESCENT/ACID STAI: CPT

## 2024-05-03 PROCEDURE — 87186 SC STD MICRODIL/AGAR DIL: CPT

## 2024-05-03 PROCEDURE — 80048 BASIC METABOLIC PNL TOTAL CA: CPT

## 2024-05-03 PROCEDURE — 86850 RBC ANTIBODY SCREEN: CPT

## 2024-05-03 PROCEDURE — 20610 DRAIN/INJ JOINT/BURSA W/O US: CPT | Mod: RT

## 2024-05-03 PROCEDURE — 73201 CT UPPER EXTREMITY W/DYE: CPT | Mod: MC

## 2024-05-03 PROCEDURE — A9579: CPT

## 2024-05-03 PROCEDURE — 85025 COMPLETE CBC W/AUTO DIFF WBC: CPT

## 2024-05-03 PROCEDURE — 85027 COMPLETE CBC AUTOMATED: CPT

## 2024-05-03 PROCEDURE — 77012 CT SCAN FOR NEEDLE BIOPSY: CPT | Mod: 26

## 2024-05-03 RX ORDER — OXYCODONE HYDROCHLORIDE 5 MG/1
5 TABLET ORAL EVERY 4 HOURS
Refills: 0 | Status: DISCONTINUED | OUTPATIENT
Start: 2024-05-03 | End: 2024-05-05

## 2024-05-03 RX ORDER — LANOLIN ALCOHOL/MO/W.PET/CERES
3 CREAM (GRAM) TOPICAL AT BEDTIME
Refills: 0 | Status: DISCONTINUED | OUTPATIENT
Start: 2024-05-03 | End: 2024-05-07

## 2024-05-03 RX ORDER — CEFAZOLIN SODIUM 1 G
1000 VIAL (EA) INJECTION ONCE
Refills: 0 | Status: COMPLETED | OUTPATIENT
Start: 2024-05-03 | End: 2024-05-03

## 2024-05-03 RX ORDER — COLCHICINE 0.6 MG
0.6 TABLET ORAL DAILY
Refills: 0 | Status: DISCONTINUED | OUTPATIENT
Start: 2024-05-03 | End: 2024-05-03

## 2024-05-03 RX ORDER — CEFAZOLIN SODIUM 1 G
1000 VIAL (EA) INJECTION ONCE
Refills: 0 | Status: DISCONTINUED | OUTPATIENT
Start: 2024-05-03 | End: 2024-05-03

## 2024-05-03 RX ORDER — FOLIC ACID 0.8 MG
1 TABLET ORAL DAILY
Refills: 0 | Status: DISCONTINUED | OUTPATIENT
Start: 2024-05-03 | End: 2024-05-07

## 2024-05-03 RX ORDER — ONDANSETRON 8 MG/1
4 TABLET, FILM COATED ORAL EVERY 6 HOURS
Refills: 0 | Status: DISCONTINUED | OUTPATIENT
Start: 2024-05-03 | End: 2024-05-07

## 2024-05-03 RX ORDER — SODIUM CHLORIDE 9 MG/ML
1000 INJECTION, SOLUTION INTRAVENOUS
Refills: 0 | Status: DISCONTINUED | OUTPATIENT
Start: 2024-05-03 | End: 2024-05-05

## 2024-05-03 RX ORDER — ASCORBIC ACID 60 MG
500 TABLET,CHEWABLE ORAL
Refills: 0 | Status: DISCONTINUED | OUTPATIENT
Start: 2024-05-03 | End: 2024-05-07

## 2024-05-03 RX ORDER — RISPERIDONE 4 MG/1
0.25 TABLET ORAL
Refills: 0 | Status: DISCONTINUED | OUTPATIENT
Start: 2024-05-03 | End: 2024-05-07

## 2024-05-03 RX ORDER — ACETAMINOPHEN 500 MG
650 TABLET ORAL EVERY 6 HOURS
Refills: 0 | Status: DISCONTINUED | OUTPATIENT
Start: 2024-05-03 | End: 2024-05-07

## 2024-05-03 RX ORDER — ASPIRIN/CALCIUM CARB/MAGNESIUM 324 MG
81 TABLET ORAL DAILY
Refills: 0 | Status: DISCONTINUED | OUTPATIENT
Start: 2024-05-03 | End: 2024-05-05

## 2024-05-03 RX ORDER — PANTOPRAZOLE SODIUM 20 MG/1
40 TABLET, DELAYED RELEASE ORAL
Refills: 0 | Status: DISCONTINUED | OUTPATIENT
Start: 2024-05-03 | End: 2024-05-07

## 2024-05-03 RX ORDER — OXYCODONE HYDROCHLORIDE 5 MG/1
10 TABLET ORAL EVERY 4 HOURS
Refills: 0 | Status: DISCONTINUED | OUTPATIENT
Start: 2024-05-03 | End: 2024-05-05

## 2024-05-03 RX ORDER — AMLODIPINE BESYLATE 2.5 MG/1
2.5 TABLET ORAL DAILY
Refills: 0 | Status: DISCONTINUED | OUTPATIENT
Start: 2024-05-03 | End: 2024-05-07

## 2024-05-03 RX ORDER — QUETIAPINE FUMARATE 200 MG/1
12.5 TABLET, FILM COATED ORAL EVERY 8 HOURS
Refills: 0 | Status: DISCONTINUED | OUTPATIENT
Start: 2024-05-03 | End: 2024-05-07

## 2024-05-03 RX ADMIN — Medication 1000 MILLIGRAM(S): at 13:54

## 2024-05-03 RX ADMIN — Medication 500 MILLIGRAM(S): at 23:38

## 2024-05-03 RX ADMIN — RISPERIDONE 0.25 MILLIGRAM(S): 4 TABLET ORAL at 23:38

## 2024-05-03 NOTE — ED PROVIDER NOTE - OBJECTIVE STATEMENT
88y Male presenting to the emergency department with a chief complaint of right shoulder incisional drainage for the past several months. Patient states he had a right rTSA with Dr. Macias 9/2023, patient has had drainage coming from the proximal aspect of his incision since the surgery. Patient states he has had no pain with ROM, denies fevers or chills as well. States his assisted living sent him in for evaluation. Patient noticed increased drainage over the past 2 weeks. He last saw his surgeon 3 months ago and they were monitoring the wound. Denies numbness or tingling to the RUE.

## 2024-05-03 NOTE — ED ADULT NURSE REASSESSMENT NOTE - NS ED NURSE REASSESS COMMENT FT1
Patient A&Ox4, observed resting in bed. Gave patient a sandraul for lunch after requesting food. Will continue to monitor. Bed is in lowest position and side rails up.

## 2024-05-03 NOTE — H&P ADULT - HISTORY OF PRESENT ILLNESS
Patient is a 88yMale LHD who presents to [place] ED w/ a c/o of right shoulder pain. Patient states that he developed R shoulder pain for the past few days. He has a history of R rTSA with Dr. Macias in September 2023. He denies any trauma. Denies HS/LOC. Denies any numbness or tingling. Denies having any other pain elsewhere. Patient denies any fever or chills. Denies any other previous orthopedic history. No other orthopedic concerns at this time.    No significant past medical history    Nephrolithiasis    Osteoarthritis    Osteoarthritis of right shoulder    Kidney stone    Hematuria    Skin cancer    HTN (hypertension)    Gout            tramadol (Unknown)  lactose (Unknown)  Celebrex (Unknown)      PHYSICAL EXAM:  T(C): 36.8 (05-03-24 @ 15:15), Max: 37.2 (05-03-24 @ 14:41)  HR: 80 (05-03-24 @ 15:15) (51 - 80)  BP: 131/88 (05-03-24 @ 15:15) (131/88 - 147/97)  RR: 18 (05-03-24 @ 15:15) (18 - 18)  SpO2: 99% (05-03-24 @ 15:15) (99% - 99%)    Gen: NAD, Resting comfortably    RIGHT Upper Extremity:   Well healed anterior shoulder scar  1 cm in diameter wound present at the proximal aspect of the shoulder scar from the aspiration  NTTP over the bony prominences of the shoulder/elbow/wrist/hand/fingers  Painless passive/active ROM of the shoulder/elbow/wrist/hand/fingers  C5-T1 SILT  Motor grossly intact throughout axillary/musculocutaneous/radial/median/ulnar/AIN/PIN nerves  + radial pulse  Compartments soft and compressible      Secondary Survey:   RLE/LLE/LUE: No TTP over bony prominences, SILT, palpable pulses, full/painless range of motion, compartments soft    Spine: No bony tenderness. No palpable stepoffs.    Imaging: s/p R Reverse TSA    A/P: 88M who presents with R shoulder pain s/p R reverse TSA with Dr. Macias in 9/2023    Will admit for observation  FU MR R Shoulder MR w/w/o  FU IR aspiration culture of R shoulder  Analgesia  WBAT RUE  DVT ppx: hold pending results of MR R shoulder w/w/o and IR aspiration  PT/OT  Discussed with Dr. Macias who is in agreement with above plan   Patient is a 88yMale LHD who presents to [place] ED w/ a c/o of right shoulder pain. Patient states that he developed R shoulder pain for the past few days. He has a history of R rTSA with Dr. Macias in September 2023. He denies any trauma. Denies HS/LOC. Denies any numbness or tingling. Denies having any other pain elsewhere. Patient denies any fever or chills. Denies any other previous orthopedic history. No other orthopedic concerns at this time.    No significant past medical history    Nephrolithiasis    Osteoarthritis    Osteoarthritis of right shoulder    Kidney stone    Hematuria    Skin cancer    HTN (hypertension)    Gout            tramadol (Unknown)  lactose (Unknown)  Celebrex (Unknown)      PHYSICAL EXAM:  T(C): 36.8 (05-03-24 @ 15:15), Max: 37.2 (05-03-24 @ 14:41)  HR: 80 (05-03-24 @ 15:15) (51 - 80)  BP: 131/88 (05-03-24 @ 15:15) (131/88 - 147/97)  RR: 18 (05-03-24 @ 15:15) (18 - 18)  SpO2: 99% (05-03-24 @ 15:15) (99% - 99%)    Gen: NAD, Resting comfortably    RIGHT Upper Extremity:   Well healed anterior shoulder scar  1 cm in diameter wound present at the proximal aspect of the shoulder scar from the aspiration  NTTP over the bony prominences of the shoulder/elbow/wrist/hand/fingers  Painless passive/active ROM of the shoulder/elbow/wrist/hand/fingers  C5-T1 SILT  Motor grossly intact throughout axillary/musculocutaneous/radial/median/ulnar/AIN/PIN nerves  + radial pulse  Full active/passive shoulder ROM with no pain  No pain with micromotion of shoulder  Compartments soft and compressible      Secondary Survey:   RLE/LLE/LUE: No TTP over bony prominences, SILT, palpable pulses, full/painless range of motion, compartments soft    Spine: No bony tenderness. No palpable stepoffs.    Imaging: s/p R Reverse TSA    A/P: 88M who presents with R shoulder pain s/p R reverse TSA with Dr. Macias in 9/2023    Will admit for observation  FU MR R Shoulder MR w/w/o  FU IR aspiration culture of R shoulder  Analgesia  WBAT RUE  DVT ppx: hold pending results of MR R shoulder w/w/o and IR aspiration  PT/OT  Discussed with Dr. Macias who is in agreement with above plan

## 2024-05-03 NOTE — ED PROVIDER NOTE - PHYSICAL EXAMINATION
PHYSICAL EXAM:    Neurological: Sensation is grossly intact to light touch of RUE.  Vascular: 2+ radial pulse of RUE. Cap refill < 2 sec. No cyanosis.  Musculoskeletal:  Right Upper Extremity - Drainage coming from the proximal aspect of the incision. 2cm opening with yellow drainage. No TTP over the shoulder and patient has full ROM of shoulder with no pain. 98

## 2024-05-03 NOTE — CONSULT NOTE ADULT - NS ATTEND AMEND GEN_ALL_CORE FT
Orthopaedic Trauma Surgeon Addendum:    I have reviewed the physician assistant note and agree with the history, exam, and plan of care, except as noted.    Will discuss with Dr. Macias and adjust plan.    Arik Romero MD  Orthopaedic Trauma Surgeon  [facility]

## 2024-05-03 NOTE — ED ADULT TRIAGE NOTE - CHIEF COMPLAINT QUOTE
Pt presents from [facility] as transfer for orthopedics. Had R shoulder replacement in September and has reported "Leaking" since operation. Denies pain/fevers. Gauze and tape over site. Arrives with R20GAC.

## 2024-05-03 NOTE — ED ADULT NURSE NOTE - CHIEF COMPLAINT QUOTE
Pt presents from [facility] as transfer for orthopedics. Had R shoulder replacement in September and has reported "Leaking" since operation. Denies pain/fevers. Gauze and tape over site. Arrives with R20GAC.

## 2024-05-03 NOTE — ED ADULT NURSE NOTE - OBJECTIVE STATEMENT
pt c/o right shoulder wound drainage. pt states he got shoulder surgery in september and surgical site has not healed. wound appears red with pus drainage. Pt denies pain at site, fevers, CP, SOB, HA/dizziness, n/v/d at this time.

## 2024-05-03 NOTE — ED ADULT NURSE REASSESSMENT NOTE - NS ED NURSE REASSESS COMMENT FT1
Assumed care of patient at 0730. Patient A&Ox4, appears resting in bed. Reports feeling right fifth digit pain with erythema, warmth, and swelling x 2-3 days that had not been mentioned prior. Currently awaiting ortho consult. Will continue to monitor. Bed is at lowest position and side rails up.

## 2024-05-03 NOTE — ED ADULT NURSE REASSESSMENT NOTE - NS ED NURSE REASSESS COMMENT FT1
Report received from FREDDIE Fajardo. Pt is A&Ox3. pt is sitting up eating dinner. Pt has no other complaints at this time. Pt does not appear to be in discomfort or acute distress at this time. Pt updated on plan of care. safety and comfort maintained.

## 2024-05-03 NOTE — CONSULT NOTE ADULT - SUBJECTIVE AND OBJECTIVE BOX
Pt Name: DEBBIE OSEGUERA  MRN: [id number]    Patient is a 88y Male presenting to the emergency department with a chief complaint of right shoulder incisional drainage for the past several months. Patient states he had a right rTSA with Dr. Macias 9/2023, patient has had drainage coming from the proximal aspect of his incision since the surgery. Patient states he has had no pain with ROM, denies fevers or chills as well. States his assisted living sent him in for evaluation. Patient noticed increased drainage over the past 2 weeks. He last saw his surgeon 3 months ago and they were monitoring the wound. Denies numbness or tingling to the RUE.     REVIEW OF SYSTEMS  General: Alert, responsive, in NAD  Musculoskeletal: SEE HPI.  Neurological: No sensory or motor changes.     PAST MEDICAL & SURGICAL HISTORY:  Nephrolithiasis  Osteoarthritis  Osteoarthritis of right shoulder  Kidney stone  Hematuria  Skin cancer  HTN (hypertension)  Gout  S/P ORIF (open reduction internal fixation) fracture  RLE/ jaw  H/O lithotripsy  H/O right inguinal hernia repair  History of total bilateral knee replacement  History of removal of retained hardware  S/P tonsillectomy  S/P hip replacement, left    Allergies: lactose (Unknown)  tramadol (Unknown)  Celebrex (Unknown)                          13.0   7.80  )-----------( 323      ( 02 May 2024 21:00 )             40.0       05-02    139  |  102  |  15.7  ----------------------------<  87  4.3   |  24.0  |  0.86    Ca    9.1      02 May 2024 21:00  Mg     1.8     05-02    TPro  6.8  /  Alb  3.6  /  TBili  0.4  /  DBili  x   /  AST  14  /  ALT  13  /  AlkPhos  133<H>  05-02    Vital Signs Last 24 Hrs  T(C): 36.4 (03 May 2024 05:07), Max: 36.9 (02 May 2024 18:38)  T(F): 97.5 (03 May 2024 05:07), Max: 98.4 (02 May 2024 18:38)  HR: 70 (03 May 2024 05:07) (67 - 88)  BP: 152/98 (03 May 2024 05:07) (140/84 - 153/89)  BP(mean): --  RR: 17 (03 May 2024 05:07) (16 - 17)  SpO2: 97% (03 May 2024 05:07) (97% - 99%)  Parameters below as of 02 May 2024 18:38  Patient On (Oxygen Delivery Method): room air    PHYSICAL EXAM:  Appearance: Alert, responsive, in no acute distress.  Neurological: Sensation is grossly intact to light touch of RUE.  Vascular: 2+ radial pulse of RUE. Cap refill < 2 sec. No cyanosis.  Musculoskeletal:  Right Upper Extremity - Drainage coming from the proximal aspect of the incision. 2cm opening with yellow drainage. No TTP over the shoulder and patient has full ROM of shoulder with no pain.     Imaging Studies: < from: CT Shoulder w/ IV Cont, Right (05.03.24 @ 03:53) >  ACC: [id number] EXAM:  CT SHOULDER ONLY IC RT   ORDERED BY: FRANSICO RAYMOND   PROCEDURE DATE:  05/03/2024    INTERPRETATION:  Clinical indication: Right shoulder drainage from the   anterior shoulder..  Technique: CT axial images of the right shoulder were obtained with   intravenous contrast. Coronal and sagittal reformatted images were also   obtained. 3-D volume rendering images were obtained from a separate   workstation  . 90 mL of Omnipaque 350 were administered. 10 mL of contrast were   discarded.  Comparison: Earlier radiographs of the same date.  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 A few small   calcific/ossific densities inferior posterior to the glenohumeral joint.   Amorphous ossific densities are seen around the humeral head (5-59).  Soft tissue: Small hypodense area around the prosthetic glenohumeral   joint (5-28). Heterogeneous collection in the axillary recess.  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  Heterogeneously hypodense collection around the joint most prominent in   the axillary recess. Superimposed infection cannot be excluded.  Heterotopic ossification around the humeral head with osteochondral   bodies in the axillary recess.  --- End of Report ---  RICO PRECIADO MD; Attending Radiologist  This document has been electronically signed. May  3 2024  5:16AM  < end of copied text >    A/P:  Pt is a  88y Male with found to have proximal incisional drainage s/p right rTSA 9/2023 with Dr. Macias     PLAN:   * Will touch base with Dr. Macias office   * Plan pending discussion with primary surgeon     Case discussed with Dr. Romero who reviewed imaging and gave plan  Pt Name: DEBBIE OSEGUERA  MRN: [id number]    Patient is a 88y Male presenting to the emergency department with a chief complaint of right shoulder incisional drainage for the past several months. Patient states he had a right rTSA with Dr. Macias 9/2023, patient has had drainage coming from the proximal aspect of his incision since the surgery. Patient states he has had no pain with ROM, denies fevers or chills as well. States his assisted living sent him in for evaluation. Patient noticed increased drainage over the past 2 weeks. He last saw his surgeon 3 months ago and they were monitoring the wound. Denies numbness or tingling to the RUE.     REVIEW OF SYSTEMS  General: Alert, responsive, in NAD  Musculoskeletal: SEE HPI.  Neurological: No sensory or motor changes.     PAST MEDICAL & SURGICAL HISTORY:  Nephrolithiasis  Osteoarthritis  Osteoarthritis of right shoulder  Kidney stone  Hematuria  Skin cancer  HTN (hypertension)  Gout  S/P ORIF (open reduction internal fixation) fracture  RLE/ jaw  H/O lithotripsy  H/O right inguinal hernia repair  History of total bilateral knee replacement  History of removal of retained hardware  S/P tonsillectomy  S/P hip replacement, left    Allergies: lactose (Unknown)  tramadol (Unknown)  Celebrex (Unknown)                          13.0   7.80  )-----------( 323      ( 02 May 2024 21:00 )             40.0       05-02    139  |  102  |  15.7  ----------------------------<  87  4.3   |  24.0  |  0.86    Ca    9.1      02 May 2024 21:00  Mg     1.8     05-02    TPro  6.8  /  Alb  3.6  /  TBili  0.4  /  DBili  x   /  AST  14  /  ALT  13  /  AlkPhos  133<H>  05-02    Vital Signs Last 24 Hrs  T(C): 36.4 (03 May 2024 05:07), Max: 36.9 (02 May 2024 18:38)  T(F): 97.5 (03 May 2024 05:07), Max: 98.4 (02 May 2024 18:38)  HR: 70 (03 May 2024 05:07) (67 - 88)  BP: 152/98 (03 May 2024 05:07) (140/84 - 153/89)  BP(mean): --  RR: 17 (03 May 2024 05:07) (16 - 17)  SpO2: 97% (03 May 2024 05:07) (97% - 99%)  Parameters below as of 02 May 2024 18:38  Patient On (Oxygen Delivery Method): room air    PHYSICAL EXAM:  Appearance: Alert, responsive, in no acute distress.  Neurological: Sensation is grossly intact to light touch of RUE.  Vascular: 2+ radial pulse of RUE. Cap refill < 2 sec. No cyanosis.  Musculoskeletal:  Right Upper Extremity - Drainage coming from the proximal aspect of the incision. 2cm opening with yellow drainage. No TTP over the shoulder and patient has full ROM of shoulder with no pain.     Imaging Studies: < from: CT Shoulder w/ IV Cont, Right (05.03.24 @ 03:53) >  ACC: [id number] EXAM:  CT SHOULDER ONLY IC RT   ORDERED BY: FRANSICO RAYMOND   PROCEDURE DATE:  05/03/2024    INTERPRETATION:  Clinical indication: Right shoulder drainage from the   anterior shoulder..  Technique: CT axial images of the right shoulder were obtained with   intravenous contrast. Coronal and sagittal reformatted images were also   obtained. 3-D volume rendering images were obtained from a separate   workstation  . 90 mL of Omnipaque 350 were administered. 10 mL of contrast were   discarded.  Comparison: Earlier radiographs of the same date.  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 A few small   calcific/ossific densities inferior posterior to the glenohumeral joint.   Amorphous ossific densities are seen around the humeral head (5-59).  Soft tissue: Small hypodense area around the prosthetic glenohumeral   joint (5-28). Heterogeneous collection in the axillary recess.  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  Heterogeneously hypodense collection around the joint most prominent in   the axillary recess. Superimposed infection cannot be excluded.  Heterotopic ossification around the humeral head with osteochondral   bodies in the axillary recess.  --- End of Report ---  RICO PRECIADO MD; Attending Radiologist  This document has been electronically signed. May  3 2024  5:16AM  < end of copied text >    A/P:  Pt is a  88y Male with found to have proximal incisional drainage s/p right rTSA 9/2023 with Dr. Macias     PLAN:   * Will touch base with Dr. Macias office   * Plan pending discussion with primary surgeon     Case discussed with Dr. Romero who reviewed imaging and gave plan     ADDENDUM 5/3 15:00  ESR/CRP elevated. discussed case/CT scan/labs with Dr. Macias at [facility]. Plan to transfer to [place] for further care.

## 2024-05-03 NOTE — H&P ADULT - ATTENDING COMMENTS
Orthopedic Sports Attending:  Agree with above resident/PA note.  Note edited where necessary.     Patient seen and examined. Images have been reviewed. He was seen at [facility] last night secondary to a draining wound at the proximal portion of his incision. He received a reverse shoulder arthroplasty in September 2023, He was sent to the hospital from his nursing home with a draining wound for 2 days after working with PT. Patient currently confused. CT with contrast does not show a draining sinus tract at this time. Aspiration of the joint came back dry. At this time, an MRI with contrast is needed to better assess superficial infection at the skin layer vs PJI. Will follow up after MRI. Started patient on Abx. Appreciate ID and medicine consults. Considering I&D with possible poly exchange pending results of MRI.      Sergei Macias, DO  Orthopaedic Surgery

## 2024-05-03 NOTE — ED PROVIDER NOTE - CLINICAL SUMMARY MEDICAL DECISION MAKING FREE TEXT BOX
Patient transferred from [place] excepted by orthopedic service recommendations of orthopedic service implemented admitted to orthopedic service

## 2024-05-04 LAB
ANION GAP SERPL CALC-SCNC: 6 MMOL/L — SIGNIFICANT CHANGE UP (ref 5–17)
APTT BLD: 35.3 SEC — SIGNIFICANT CHANGE UP (ref 24.5–35.6)
BUN SERPL-MCNC: 15 MG/DL — SIGNIFICANT CHANGE UP (ref 7–23)
CALCIUM SERPL-MCNC: 9 MG/DL — SIGNIFICANT CHANGE UP (ref 8.5–10.1)
CHLORIDE SERPL-SCNC: 109 MMOL/L — HIGH (ref 96–108)
CO2 SERPL-SCNC: 26 MMOL/L — SIGNIFICANT CHANGE UP (ref 22–31)
CREAT SERPL-MCNC: 0.91 MG/DL — SIGNIFICANT CHANGE UP (ref 0.5–1.3)
EGFR: 82 ML/MIN/1.73M2 — SIGNIFICANT CHANGE UP
GLUCOSE SERPL-MCNC: 101 MG/DL — HIGH (ref 70–99)
GRAM STN FLD: SIGNIFICANT CHANGE UP
HCT VFR BLD CALC: 38.2 % — LOW (ref 39–50)
HGB BLD-MCNC: 12.4 G/DL — LOW (ref 13–17)
INR BLD: 0.95 RATIO — SIGNIFICANT CHANGE UP (ref 0.85–1.18)
MCHC RBC-ENTMCNC: 27.9 PG — SIGNIFICANT CHANGE UP (ref 27–34)
MCHC RBC-ENTMCNC: 32.5 GM/DL — SIGNIFICANT CHANGE UP (ref 32–36)
MCV RBC AUTO: 86 FL — SIGNIFICANT CHANGE UP (ref 80–100)
PLATELET # BLD AUTO: 279 K/UL — SIGNIFICANT CHANGE UP (ref 150–400)
POTASSIUM SERPL-MCNC: 3.7 MMOL/L — SIGNIFICANT CHANGE UP (ref 3.5–5.3)
POTASSIUM SERPL-SCNC: 3.7 MMOL/L — SIGNIFICANT CHANGE UP (ref 3.5–5.3)
PROTHROM AB SERPL-ACNC: 10.8 SEC — SIGNIFICANT CHANGE UP (ref 9.5–13)
RBC # BLD: 4.44 M/UL — SIGNIFICANT CHANGE UP (ref 4.2–5.8)
RBC # FLD: 15.6 % — HIGH (ref 10.3–14.5)
SODIUM SERPL-SCNC: 141 MMOL/L — SIGNIFICANT CHANGE UP (ref 135–145)
SPECIMEN SOURCE: SIGNIFICANT CHANGE UP
WBC # BLD: 6.91 K/UL — SIGNIFICANT CHANGE UP (ref 3.8–10.5)
WBC # FLD AUTO: 6.91 K/UL — SIGNIFICANT CHANGE UP (ref 3.8–10.5)

## 2024-05-04 PROCEDURE — 99222 1ST HOSP IP/OBS MODERATE 55: CPT

## 2024-05-04 PROCEDURE — 73223 MRI JOINT UPR EXTR W/O&W/DYE: CPT | Mod: 26,RT

## 2024-05-04 PROCEDURE — 93010 ELECTROCARDIOGRAM REPORT: CPT

## 2024-05-04 RX ORDER — SODIUM CHLORIDE 9 MG/ML
1000 INJECTION INTRAMUSCULAR; INTRAVENOUS; SUBCUTANEOUS
Refills: 0 | Status: DISCONTINUED | OUTPATIENT
Start: 2024-05-04 | End: 2024-05-05

## 2024-05-04 RX ORDER — INFLUENZA VIRUS VACCINE 15; 15; 15; 15 UG/.5ML; UG/.5ML; UG/.5ML; UG/.5ML
0.7 SUSPENSION INTRAMUSCULAR ONCE
Refills: 0 | Status: DISCONTINUED | OUTPATIENT
Start: 2024-05-04 | End: 2024-05-07

## 2024-05-04 RX ORDER — HEPARIN SODIUM 5000 [USP'U]/ML
5000 INJECTION INTRAVENOUS; SUBCUTANEOUS ONCE
Refills: 0 | Status: COMPLETED | OUTPATIENT
Start: 2024-05-04 | End: 2024-05-04

## 2024-05-04 RX ADMIN — HEPARIN SODIUM 5000 UNIT(S): 5000 INJECTION INTRAVENOUS; SUBCUTANEOUS at 19:06

## 2024-05-04 RX ADMIN — PANTOPRAZOLE SODIUM 40 MILLIGRAM(S): 20 TABLET, DELAYED RELEASE ORAL at 06:09

## 2024-05-04 RX ADMIN — Medication 500 MILLIGRAM(S): at 22:32

## 2024-05-04 RX ADMIN — QUETIAPINE FUMARATE 12.5 MILLIGRAM(S): 200 TABLET, FILM COATED ORAL at 16:06

## 2024-05-04 RX ADMIN — AMLODIPINE BESYLATE 2.5 MILLIGRAM(S): 2.5 TABLET ORAL at 09:05

## 2024-05-04 RX ADMIN — RISPERIDONE 0.25 MILLIGRAM(S): 4 TABLET ORAL at 09:04

## 2024-05-04 RX ADMIN — SODIUM CHLORIDE 100 MILLILITER(S): 9 INJECTION, SOLUTION INTRAVENOUS at 06:09

## 2024-05-04 RX ADMIN — RISPERIDONE 0.25 MILLIGRAM(S): 4 TABLET ORAL at 22:32

## 2024-05-04 RX ADMIN — SODIUM CHLORIDE 100 MILLILITER(S): 9 INJECTION, SOLUTION INTRAVENOUS at 17:47

## 2024-05-04 RX ADMIN — Medication 1 MILLIGRAM(S): at 12:04

## 2024-05-04 RX ADMIN — Medication 3 MILLIGRAM(S): at 22:35

## 2024-05-04 NOTE — PATIENT PROFILE ADULT - NSCANCSCRNDXHH_GEN_A_NUR
Internal Med Progress Note


Date of Service:


Sep 14, 2017.


Provider Documentation:





SUBJECTIVE:





The patient was seen and examined 


Denies  any symptoms again today 


Has Poor eye sight


Can not manage his own Insulin


Accepted to [facility]











OBJECTIVE:





Vital Signs-as noted below





Exam:


General-NO distress at rest


Eyes-normal


ENT-normal


Neck-supple


Lungs-clear to auscultate bilaterally 


Heart-Regular,no murmur 


Abdomen-Benign,no masses,bowel sound present 


Extremities-No edema 


Neuro-AAOx3





Lab data as noted below.


ASSESSMENT & PLAN:





Uncontrolled Diabetes


Potential to get worse with DKA/Hyperosmolar state 


Last HbA1c A1c (9.6 in 2/2017)


Likely secondary to medication non-compliance, dietary indiscretion, rule out 

infection- afebrile, +mild leukocytosis (WBC 13K) f/u CXR and UA


Started on  insulin drip and monitor electrolytes


Consult pharmacy for glycemic control-appreciate input 


Check [id number] at 15


Diabetes educator consult, also recommend podiatry referral as outpatient


Discharge to [facility] on Insulin 





HYPOXIA-without any definite cause 


Was placed on O2 nasal cannula in ER, was hypoxic to high 80's upon removal by 

ER RN


Not on home O2- check CXR to r/o PNA given leukocytosis and c/o cough


Continue supplemental O2 per protocol 


Resolved 





DEANNE ON CKD STAGE III


Creat is 2.0, baseline 1.2


Possibly secondary to dehydration; 


No h/o NSAID use 


Hold lisinopril


Received 1 liter IVF's in ER- continue at 125 cc/hour


Avoid nephrotoxins when able 


Creatinine improving


Will restart Lisinopril





URINARY RETENTION


Unable to void today, bladder scan + urinary retention in ER- 1450 mL output on 

straight cath 


UA pending-negative


Likely secondary to BPH 


Bladder scan and straight cath PRN


Urology consulted-appreciate Input


S/P Grady placement and Flomax added


Will need to see Urology in 1 week








CHRONIC ANEMIA


Hg is 10.9, was 11's back in 2013


Follow up as outpatient





DYSLIPIDEMIA


Continue statin





DVT PROPHYLAXIS


Heparin SQ





CODE STATUS


Full code per my discussion with the patient





DISPOSITION


Admission telemetry


May need placement, lives alone, friend concerned he is unable to care for 

himself at home, and she is unable to care for him after discharge


PT, OT, social service evaluation


Follows with Dr. Stephanie Covarrubias for primary care


Discharge to [facility] today


Vital Signs:











  Date Time  Temp Pulse Resp B/P (MAP) Pulse Ox O2 Delivery O2 Flow Rate FiO2


 


9/14/17 11:26 37.4 91 16 124/72 (89) 98 Nasal Cannula 2.0 


 


9/14/17 08:00      Nasal Cannula 1.0 


 


9/14/17 07:34 36.5 97 16 99/64 (76) 97 Nasal Cannula 2.0 


 


9/14/17 04:50 36.6 87 20 120/71 (87) 93  2.0 


 


9/14/17 04:00      Nasal Cannula 1.0 


 


9/14/17 00:19 36.4 94 20 152/85 (107) 97  2.0 


 


9/14/17 00:00      Nasal Cannula 1.0 


 


9/13/17 20:29 36.9 90 18 144/85 (104) 97 Nasal Cannula 2.0 


 


9/13/17 20:00     98 Nasal Cannula 1.0 


 


9/13/17 16:00     98 Nasal Cannula 1.0 


 


9/13/17 15:04 37.1 96 18 112/68 (83) 98 Nasal Cannula 2.0 


 


9/13/17 12:20      Nasal Cannula 2.0 








Lab Results:





Results Past 24 Hours








Test


  9/13/17


16:22 9/13/17


20:32 9/14/17


01:38 9/14/17


01:52 Range/Units


 


 


Bedside Glucose 199 83 73 106 70-99  mg/dl


 


Test


  9/14/17


06:23 9/14/17


07:51 9/14/17


11:38 


  Range/Units


 


 


White Blood Count 8.95    4.8-10.8  K/uL


 


Red Blood Count 3.58    4.7-6.1  M/uL


 


Hemoglobin 10.4    14.0-18.0  g/dL


 


Hematocrit 31.7    42-52  %


 


Mean Corpuscular Volume 88.5      fL


 


Mean Corpuscular Hemoglobin 29.1    25-34  pg


 


Mean Corpuscular Hemoglobin


Concent 32.8


  


  


  


  32-36  g/dl


 


 


RDW Standard Deviation 42.2    36.4-46.3  fL


 


RDW Coefficient of Variation 13.0    11.5-14.5  %


 


Platelet Count 186    130-400  K/uL


 


Mean Platelet Volume 10.3    7.4-10.4  fL


 


Sodium Level 140    136-145  mmol/L


 


Potassium Level 4.3    3.5-5.1  mmol/L


 


Chloride Level 106      mmol/L


 


Carbon Dioxide Level 28    21-32  mmol/L


 


Anion Gap 6.0    3-11  mmol/L


 


Blood Urea Nitrogen 18    7-18  mg/dl


 


Creatinine


  1.10


  


  


  


  0.60-1.40


mg/dl


 


Est Creatinine Clear Calc


Drug Dose 63.8


  


  


  


   ml/min


 


 


Estimated GFR (


American) 77.9


  


  


  


   


 


 


Estimated GFR (Non-


American 67.2


  


  


  


   


 


 


BUN/Creatinine Ratio 16.7    10-20  


 


Random Glucose 101    70-99  mg/dl


 


Calcium Level 8.9    8.5-10.1  mg/dl


 


Phosphorus Level 3.3    2.5-4.9  mg/dl


 


Magnesium Level 2.2    1.8-2.4  mg/dl


 


Bedside Glucose  114 138  70-99  mg/dl no

## 2024-05-04 NOTE — PATIENT PROFILE ADULT - FALL HARM RISK - HARM RISK INTERVENTIONS
Assistance with ambulation/Assistance OOB with selected safe patient handling equipment/Communicate Risk of Fall with Harm to all staff/Discuss with provider need for PT consult/Monitor gait and stability/Provide patient with walking aids - walker, cane, crutches/Reinforce activity limits and safety measures with patient and family/Tailored Fall Risk Interventions/Use of alarms - bed, chair and/or voice tab/Visual Cue: Yellow wristband and red socks/Bed in lowest position, wheels locked, appropriate side rails in place/Call bell, personal items and telephone in reach/Instruct patient to call for assistance before getting out of bed or chair/Non-slip footwear when patient is out of bed/[place] to call system/Physically safe environment - no spills, clutter or unnecessary equipment/Purposeful Proactive Rounding/Room/bathroom lighting operational, light cord in reach

## 2024-05-04 NOTE — CHART NOTE - NSCHARTNOTEFT_GEN_A_CORE
Patient status-post MR Right Shoulder. Based on results and in discussion with attending Orthopedic Surgeon, patient indicated for Irrigation and Debridement of Right Total Shoulder Arthroplasty with Polyethylene Liner Exchange and Possible Revision Right Total Shoulder Arthroplasty versus Possible Revision Right Reverse Total Shoulder Arthroplasty.     Internal Medicine consulted for evaluation for and documentation of pre-operative optimization and informed of plan to proceed to the operating room tomorrow morning (5/5/24).       - Will follow-up Medical clearance in anticipation of OR in morning.   - Hold antibiotics.   - Hold DVT Ppx at midnight.   - NPO after midnight except medications.   - Follow-up Pre-Op labs.   - Plan discussed with Dr. Macias (Orthopedic Surgery) and Dr. Hudson (Internal Medicine).

## 2024-05-04 NOTE — CONSULT NOTE ADULT - ASSESSMENT
The patient is a 88y Male with significant PMH of HTN, Gout, GERD, nephrolithiasis, Osteoarthritis and others had been admitted yesterday on 05/03/2024 under Ortho service under care of Dr. 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  Patient had reversal of right TSA in 03/2023 by Dr. Macias. Hospitalist consult has been called today by Ortho resident for Pre-operative risk stratification as plan for right shoulder washout with poly exchange in OR tomorrow. Yesterday, patient had right shoulder arthrocentesis by IR. which was dry tap. MRI done today shows large glenohumeral joint effusion concerning for septic joint infection.  Currently, patient is not on any antibiotics. Patient says that he still has leakage and drainage per right shoulder anterior surgical site. At this time, patient denies ant pain to his right shoulder.  He denies any chest pain, sob, CHÁVEZ, palpitation, headache, N/V, abdominal pain, diarrhea or dysuria.  He denies any prior history of MI or stroke.  He denies smoking, alcohol or substance abuse.    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  -S/p dry arthrocentesis on 05/03/2024.  -Blood culture so far negative.  -Pain control and if any antibiotics per primary team/Ortho.  -Plan for right shoulder washout with poly exchange in OR tomorrow.  -May proceed to OR as planned with mild-moderate perioperative and postoperative associated risks.  -Further management per primary team.    # HTN.  -Stable and controlled.  -On Norvasc.    # GERD.  -On Protonix.    # H/o Gout.  -In remission.  Denies any pain.    # DVT prophylaxis: per Ortho.    Hospitalist service will follow along with you while in the hospital.    Thanks for allowing the hospitalist service to participate in the care of Mr Jesus De Leon. The patient is a 88y Male with significant PMH of HTN, Gout, GERD, nephrolithiasis, Osteoarthritis and others had been admitted yesterday on 2024 under Ortho service under care of Dr. 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  Patient had reversal of right TSA in 2023 by Dr. Macias. Hospitalist consult has been called today by Ortho resident for Pre-operative risk stratification as plan for right shoulder washout with poly exchange in OR tomorrow. Yesterday, patient had right shoulder arthrocentesis by IR. which was dry tap. MRI done today shows large glenohumeral joint effusion concerning for septic joint infection.  Currently, patient is not on any antibiotics. Patient says that he still has leakage and drainage per right shoulder anterior surgical site. At this time, patient denies ant pain to his right shoulder.  He denies any chest pain, sob, CHÁVEZ, palpitation, headache, N/V, abdominal pain, diarrhea or dysuria.  He denies any prior history of MI or stroke.  He denies smoking, alcohol or substance abuse.    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  -S/p dry arthrocentesis on 2024.  -Blood culture so far negative.  -EK bpm with occasional PVCs.  -Check Mg level, if less than 1.8, then replace.  -Pain control and if any antibiotics per primary team/Ortho.  -Plan for right shoulder washout with poly exchange in OR tomorrow.  -May proceed to OR as planned with mild-moderate perioperative and postoperative associated risks.  -Further management per primary team.    # HTN.  -Stable and controlled.  -On Norvasc.    # GERD.  -On Protonix.    # H/o Gout.  -In remission.  Denies any pain.    # DVT prophylaxis: per Ortho.    Hospitalist service will follow along with you while in the hospital.    Thanks for allowing the hospitalist service to participate in the care of Mr Jesus De Leon.

## 2024-05-04 NOTE — CONSULT NOTE ADULT - SUBJECTIVE AND OBJECTIVE BOX
Chief Complaint: post operative complication.    The patient is a 88y Male with significant PMH of HTN, Gout, GERD, nephrolithiasis, Osteoarthritis and others had been admitted yesterday on 05/03/2024 under Ortho service under care of Dr. 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  Patient had reversal of right TSA in 03/2023 by Dr. Macias. Hospitalist consult has been called today by Ortho resident for Pre-operative risk stratification as plan for right shoulder washout with poly exchange in OR tomorrow. Yesterday, patient had right shoulder arthrocentesis by IR. which was dry tap. MRI done today shows large glenohumeral joint effusion concerning for septic joint infection.  Currently, patient is not on any antibiotics. Patient says that he still has leakage and drainage per right shoulder anterior surgical site. At this time, patient denies ant pain to his right shoulder.  He denies any chest pain, sob, CHÁVEZ, palpitation, headache, N/V, abdominal pain, diarrhea or dysuria.  He denies any prior history of MI or stroke.  He denies smoking, alcohol or substance abuse.     REVIEW OF SYSTEMS:  10 points ROS have been reviewed and are grossly unremarkable except those mentioned in HPI above otherwise negative.      HOME MEDICATIONS/PRESCRIPTIONS:  acetaminophen 325 mg oral tablet: 2 tab(s) orally every 6 hours As needed Mild Pain (1 - 3)  amLODIPine 2.5 mg oral tablet: 1 tab(s) orally once a day Hold for systolic BP &lt;110  aspirin 81 mg oral tablet: 1 tab(s) orally once a day  colchicine 0.6 mg oral tablet: 1 tab(s) orally once a day x2 days ONLY  melatonin 3 mg oral tablet: 1 tab(s) orally once a day (at bedtime) As needed Insomnia  polyethylene glycol 3350 oral powder for reconstitution: 17 gram(s) orally once a day  predniSONE 10 mg oral tablet: 1 tab(s) orally once a day x3 more days  Protonix 40 mg oral delayed release tablet: 1 tab(s) orally once a day  QUEtiapine: 12.5 milligram(s) orally every 8 hours as needed for  anxiety  risperiDONE 0.25 mg oral tablet: 1 tab(s) orally 2 times a day        MEDICATIONS  (STANDING):  amLODIPine   Tablet 2.5 milliGRAM(s) Oral daily  ascorbic acid 500 milliGRAM(s) Oral two times a day  aspirin enteric coated 81 milliGRAM(s) Oral daily  folic acid 1 milliGRAM(s) Oral daily  influenza  Vaccine (HIGH DOSE) 0.7 milliLiter(s) IntraMuscular once  lactated ringers. 1000 milliLiter(s) (100 mL/Hr) IV Continuous <Continuous>  multivitamin 1 Tablet(s) Oral daily  pantoprazole    Tablet 40 milliGRAM(s) Oral before breakfast  risperiDONE   Tablet 0.25 milliGRAM(s) Oral two times a day  sodium chloride 0.9%. 1000 milliLiter(s) (75 mL/Hr) IV Continuous <Continuous>    MEDICATIONS  (PRN):  acetaminophen     Tablet .. 650 milliGRAM(s) Oral every 6 hours PRN Temp greater or equal to 38C (100.4F), Mild Pain (1 - 3)  melatonin 3 milliGRAM(s) Oral at bedtime PRN Sleep  ondansetron Injectable 4 milliGRAM(s) IV Push every 6 hours PRN Nausea and/or Vomiting  oxyCODONE    IR 10 milliGRAM(s) Oral every 4 hours PRN Severe Pain (7 - 10)  oxyCODONE    IR 5 milliGRAM(s) Oral every 4 hours PRN Moderate Pain (4 - 6)  QUEtiapine 12.5 milliGRAM(s) Oral every 8 hours PRN for anxiety    PAST MEDICAL & SURGICAL HISTORY:  Nephrolithiasis      Osteoarthritis      Osteoarthritis of right shoulder      Kidney stone      Hematuria      Skin cancer      HTN (hypertension)      Gout      S/P ORIF (open reduction internal fixation) fracture  RLE/ jaw      H/O lithotripsy      H/O right inguinal hernia repair      History of total bilateral knee replacement      History of removal of retained hardware      S/P tonsillectomy      S/P hip replacement, left        FAMILY HISTORY:  Family history of breast cancer (Mother, Sibling).    Social History:  Denies smoking, alcohol or substance abuse.       PHYSICAL EXAM:  GENERAL: NAD, lying in bed comfortably  HEAD:  Atraumatic, Normocephalic  EYES: EOMI, PERRLA, conjunctiva and sclera clear  ENT: Moist mucous membranes  NECK: Supple, No JVD  CHEST/LUNG: Clear to auscultation bilaterally; No rales, rhonchi, wheezing, or rubs. Unlabored respirations  HEART: Regular rate and rhythm; No murmurs, rubs, or gallops  ABDOMEN: Bowel sounds present; Soft, Nontender, Nondistended. No hepatomegally  EXTREMITIES:  2+ Peripheral Pulses, brisk capillary refill. No clubbing, cyanosis, or edema  NERVOUS SYSTEM:  Alert & Oriented X3, speech clear. No deficits   MSK: Right shoulder surgical site drainage.      LABS:                        12.4   6.91  )-----------( 279      ( 04 May 2024 05:14 )             38.2     05-04    141  |  109<H>  |  15  ----------------------------<  101<H>  3.7   |  26  |  0.91    Ca    9.0      04 May 2024 05:14      PT/INR - ( 04 May 2024 05:14 )   PT: 10.8 sec;   INR: 0.95 ratio         PTT - ( 04 May 2024 05:14 )  PTT:35.3 sec      < from: MR Shoulder Joint w/wo IV Cont, Right (05.04.24 @ 13:44) >  IMPRESSION:  Status post reverse total shoulder arthroplasty with adjacent   susceptibility artifact. There is a large glenohumeral joint effusion   which appears to connect to a soft tissue defect within the anterior soft   tissues with marked postcontrast enhancement. This concerning for septic   joint infection.    --- End of Report ---    < end of copied text >                   Chief Complaint: post operative complication.    The patient is a 88y Male with significant PMH of HTN, Gout, GERD, nephrolithiasis, Osteoarthritis and others had been admitted yesterday on 2024 under Ortho service under care of Dr. 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  Patient had reversal of right TSA in 2023 by Dr. Macias. Hospitalist consult has been called today by Ortho resident for Pre-operative risk stratification as plan for right shoulder washout with poly exchange in OR tomorrow. Yesterday, patient had right shoulder arthrocentesis by IR. which was dry tap. MRI done today shows large glenohumeral joint effusion concerning for septic joint infection.  Currently, patient is not on any antibiotics. Patient says that he still has leakage and drainage per right shoulder anterior surgical site. At this time, patient denies ant pain to his right shoulder.  He denies any chest pain, sob, CHÁVEZ, palpitation, headache, N/V, abdominal pain, diarrhea or dysuria.  He denies any prior history of MI or stroke.  He denies smoking, alcohol or substance abuse.  EK bpm with occasional PVCs.    REVIEW OF SYSTEMS:  10 points ROS have been reviewed and are grossly unremarkable except those mentioned in HPI above otherwise negative.      HOME MEDICATIONS/PRESCRIPTIONS:  acetaminophen 325 mg oral tablet: 2 tab(s) orally every 6 hours As needed Mild Pain (1 - 3)  amLODIPine 2.5 mg oral tablet: 1 tab(s) orally once a day Hold for systolic BP &lt;110  aspirin 81 mg oral tablet: 1 tab(s) orally once a day  colchicine 0.6 mg oral tablet: 1 tab(s) orally once a day x2 days ONLY  melatonin 3 mg oral tablet: 1 tab(s) orally once a day (at bedtime) As needed Insomnia  polyethylene glycol 3350 oral powder for reconstitution: 17 gram(s) orally once a day  predniSONE 10 mg oral tablet: 1 tab(s) orally once a day x3 more days  Protonix 40 mg oral delayed release tablet: 1 tab(s) orally once a day  QUEtiapine: 12.5 milligram(s) orally every 8 hours as needed for  anxiety  risperiDONE 0.25 mg oral tablet: 1 tab(s) orally 2 times a day        MEDICATIONS  (STANDING):  amLODIPine   Tablet 2.5 milliGRAM(s) Oral daily  ascorbic acid 500 milliGRAM(s) Oral two times a day  aspirin enteric coated 81 milliGRAM(s) Oral daily  folic acid 1 milliGRAM(s) Oral daily  influenza  Vaccine (HIGH DOSE) 0.7 milliLiter(s) IntraMuscular once  lactated ringers. 1000 milliLiter(s) (100 mL/Hr) IV Continuous <Continuous>  multivitamin 1 Tablet(s) Oral daily  pantoprazole    Tablet 40 milliGRAM(s) Oral before breakfast  risperiDONE   Tablet 0.25 milliGRAM(s) Oral two times a day  sodium chloride 0.9%. 1000 milliLiter(s) (75 mL/Hr) IV Continuous <Continuous>    MEDICATIONS  (PRN):  acetaminophen     Tablet .. 650 milliGRAM(s) Oral every 6 hours PRN Temp greater or equal to 38C (100.4F), Mild Pain (1 - 3)  melatonin 3 milliGRAM(s) Oral at bedtime PRN Sleep  ondansetron Injectable 4 milliGRAM(s) IV Push every 6 hours PRN Nausea and/or Vomiting  oxyCODONE    IR 10 milliGRAM(s) Oral every 4 hours PRN Severe Pain (7 - 10)  oxyCODONE    IR 5 milliGRAM(s) Oral every 4 hours PRN Moderate Pain (4 - 6)  QUEtiapine 12.5 milliGRAM(s) Oral every 8 hours PRN for anxiety    PAST MEDICAL & SURGICAL HISTORY:  Nephrolithiasis      Osteoarthritis      Osteoarthritis of right shoulder      Kidney stone      Hematuria      Skin cancer      HTN (hypertension)      Gout      S/P ORIF (open reduction internal fixation) fracture  RLE/ jaw      H/O lithotripsy      H/O right inguinal hernia repair      History of total bilateral knee replacement      History of removal of retained hardware      S/P tonsillectomy      S/P hip replacement, left        FAMILY HISTORY:  Family history of breast cancer (Mother, Sibling).    Social History:  Denies smoking, alcohol or substance abuse.       PHYSICAL EXAM:  GENERAL: NAD, lying in bed comfortably  HEAD:  Atraumatic, Normocephalic  EYES: EOMI, PERRLA, conjunctiva and sclera clear  ENT: Moist mucous membranes  NECK: Supple, No JVD  CHEST/LUNG: Clear to auscultation bilaterally; No rales, rhonchi, wheezing, or rubs. Unlabored respirations  HEART: Regular rate and rhythm; No murmurs, rubs, or gallops  ABDOMEN: Bowel sounds present; Soft, Nontender, Nondistended. No hepatomegally  EXTREMITIES:  2+ Peripheral Pulses, brisk capillary refill. No clubbing, cyanosis, or edema  NERVOUS SYSTEM:  Alert & Oriented X3, speech clear. No deficits   MSK: Right shoulder surgical site drainage.      LABS:                        12.4   6.91  )-----------( 279      ( 04 May 2024 05:14 )             38.2     05-    141  |  109<H>  |  15  ----------------------------<  101<H>  3.7   |  26  |  0.91    Ca    9.0      04 May 2024 05:14      PT/INR - ( 04 May 2024 05:14 )   PT: 10.8 sec;   INR: 0.95 ratio         PTT - ( 04 May 2024 05:14 )  PTT:35.3 sec      < from: MR Shoulder Joint w/wo IV Cont, Right (24 @ 13:44) >  IMPRESSION:  Status post reverse total shoulder arthroplasty with adjacent   susceptibility artifact. There is a large glenohumeral joint effusion   which appears to connect to a soft tissue defect within the anterior soft   tissues with marked postcontrast enhancement. This concerning for septic   joint infection.    --- End of Report ---    < end of copied text >

## 2024-05-05 ENCOUNTER — TRANSCRIPTION ENCOUNTER (OUTPATIENT)
Age: 89
End: 2024-05-05

## 2024-05-05 ENCOUNTER — RESULT REVIEW (OUTPATIENT)
Age: 89
End: 2024-05-05

## 2024-05-05 LAB
ANION GAP SERPL CALC-SCNC: 4 MMOL/L — LOW (ref 5–17)
ANION GAP SERPL CALC-SCNC: 4 MMOL/L — LOW (ref 5–17)
APTT BLD: 37.4 SEC — HIGH (ref 24.5–35.6)
BUN SERPL-MCNC: 17 MG/DL — SIGNIFICANT CHANGE UP (ref 7–23)
BUN SERPL-MCNC: 19 MG/DL — SIGNIFICANT CHANGE UP (ref 7–23)
CALCIUM SERPL-MCNC: 8.7 MG/DL — SIGNIFICANT CHANGE UP (ref 8.5–10.1)
CALCIUM SERPL-MCNC: 9.1 MG/DL — SIGNIFICANT CHANGE UP (ref 8.5–10.1)
CHLORIDE SERPL-SCNC: 110 MMOL/L — HIGH (ref 96–108)
CHLORIDE SERPL-SCNC: 111 MMOL/L — HIGH (ref 96–108)
CO2 SERPL-SCNC: 27 MMOL/L — SIGNIFICANT CHANGE UP (ref 22–31)
CO2 SERPL-SCNC: 27 MMOL/L — SIGNIFICANT CHANGE UP (ref 22–31)
CREAT SERPL-MCNC: 0.9 MG/DL — SIGNIFICANT CHANGE UP (ref 0.5–1.3)
CREAT SERPL-MCNC: 1.08 MG/DL — SIGNIFICANT CHANGE UP (ref 0.5–1.3)
CRP SERPL-MCNC: 30 MG/L — HIGH
EGFR: 66 ML/MIN/1.73M2 — SIGNIFICANT CHANGE UP
EGFR: 83 ML/MIN/1.73M2 — SIGNIFICANT CHANGE UP
ERYTHROCYTE [SEDIMENTATION RATE] IN BLOOD: 35 MM/HR — HIGH (ref 0–20)
GLUCOSE SERPL-MCNC: 102 MG/DL — HIGH (ref 70–99)
GLUCOSE SERPL-MCNC: 133 MG/DL — HIGH (ref 70–99)
GRAM STN FLD: SIGNIFICANT CHANGE UP
HCT VFR BLD CALC: 37.2 % — LOW (ref 39–50)
HCT VFR BLD CALC: 38.3 % — LOW (ref 39–50)
HGB BLD-MCNC: 11.8 G/DL — LOW (ref 13–17)
HGB BLD-MCNC: 12.3 G/DL — LOW (ref 13–17)
INR BLD: 0.94 RATIO — SIGNIFICANT CHANGE UP (ref 0.85–1.18)
MCHC RBC-ENTMCNC: 27.8 PG — SIGNIFICANT CHANGE UP (ref 27–34)
MCHC RBC-ENTMCNC: 27.8 PG — SIGNIFICANT CHANGE UP (ref 27–34)
MCHC RBC-ENTMCNC: 31.7 GM/DL — LOW (ref 32–36)
MCHC RBC-ENTMCNC: 32.1 GM/DL — SIGNIFICANT CHANGE UP (ref 32–36)
MCV RBC AUTO: 86.7 FL — SIGNIFICANT CHANGE UP (ref 80–100)
MCV RBC AUTO: 87.7 FL — SIGNIFICANT CHANGE UP (ref 80–100)
PLATELET # BLD AUTO: 280 K/UL — SIGNIFICANT CHANGE UP (ref 150–400)
PLATELET # BLD AUTO: 300 K/UL — SIGNIFICANT CHANGE UP (ref 150–400)
POTASSIUM SERPL-MCNC: 4.1 MMOL/L — SIGNIFICANT CHANGE UP (ref 3.5–5.3)
POTASSIUM SERPL-MCNC: 4.2 MMOL/L — SIGNIFICANT CHANGE UP (ref 3.5–5.3)
POTASSIUM SERPL-SCNC: 4.1 MMOL/L — SIGNIFICANT CHANGE UP (ref 3.5–5.3)
POTASSIUM SERPL-SCNC: 4.2 MMOL/L — SIGNIFICANT CHANGE UP (ref 3.5–5.3)
PROTHROM AB SERPL-ACNC: 10.6 SEC — SIGNIFICANT CHANGE UP (ref 9.5–13)
RBC # BLD: 4.24 M/UL — SIGNIFICANT CHANGE UP (ref 4.2–5.8)
RBC # BLD: 4.42 M/UL — SIGNIFICANT CHANGE UP (ref 4.2–5.8)
RBC # FLD: 15.7 % — HIGH (ref 10.3–14.5)
RBC # FLD: 15.7 % — HIGH (ref 10.3–14.5)
SODIUM SERPL-SCNC: 141 MMOL/L — SIGNIFICANT CHANGE UP (ref 135–145)
SODIUM SERPL-SCNC: 142 MMOL/L — SIGNIFICANT CHANGE UP (ref 135–145)
SPECIMEN SOURCE: SIGNIFICANT CHANGE UP
WBC # BLD: 10.42 K/UL — SIGNIFICANT CHANGE UP (ref 3.8–10.5)
WBC # BLD: 6.53 K/UL — SIGNIFICANT CHANGE UP (ref 3.8–10.5)
WBC # FLD AUTO: 10.42 K/UL — SIGNIFICANT CHANGE UP (ref 3.8–10.5)
WBC # FLD AUTO: 6.53 K/UL — SIGNIFICANT CHANGE UP (ref 3.8–10.5)

## 2024-05-05 PROCEDURE — 23473 REVIS RECONST SHOULDER JOINT: CPT | Mod: RT

## 2024-05-05 PROCEDURE — 88305 TISSUE EXAM BY PATHOLOGIST: CPT | Mod: 26

## 2024-05-05 PROCEDURE — 20680 REMOVAL OF IMPLANT DEEP: CPT | Mod: RT

## 2024-05-05 PROCEDURE — 23472 RECONSTRUCT SHOULDER JOINT: CPT | Mod: 22,RT

## 2024-05-05 PROCEDURE — 73030 X-RAY EXAM OF SHOULDER: CPT | Mod: 26,RT

## 2024-05-05 RX ORDER — RISPERIDONE 4 MG/1
0.25 TABLET ORAL
Refills: 0 | Status: DISCONTINUED | OUTPATIENT
Start: 2024-05-05 | End: 2024-05-05

## 2024-05-05 RX ORDER — CHLORHEXIDINE GLUCONATE 213 G/1000ML
1 SOLUTION TOPICAL ONCE
Refills: 0 | Status: COMPLETED | OUTPATIENT
Start: 2024-05-05 | End: 2024-05-05

## 2024-05-05 RX ORDER — SENNA PLUS 8.6 MG/1
2 TABLET ORAL AT BEDTIME
Refills: 0 | Status: DISCONTINUED | OUTPATIENT
Start: 2024-05-05 | End: 2024-05-07

## 2024-05-05 RX ORDER — HALOPERIDOL DECANOATE 100 MG/ML
1 INJECTION INTRAMUSCULAR EVERY 8 HOURS
Refills: 0 | Status: DISCONTINUED | OUTPATIENT
Start: 2024-05-05 | End: 2024-05-07

## 2024-05-05 RX ORDER — CEFTRIAXONE 500 MG/1
2000 INJECTION, POWDER, FOR SOLUTION INTRAMUSCULAR; INTRAVENOUS EVERY 24 HOURS
Refills: 0 | Status: DISCONTINUED | OUTPATIENT
Start: 2024-05-05 | End: 2024-05-07

## 2024-05-05 RX ORDER — HYDROMORPHONE HYDROCHLORIDE 2 MG/ML
0.5 INJECTION INTRAMUSCULAR; INTRAVENOUS; SUBCUTANEOUS
Refills: 0 | Status: DISCONTINUED | OUTPATIENT
Start: 2024-05-05 | End: 2024-05-05

## 2024-05-05 RX ORDER — PANTOPRAZOLE SODIUM 20 MG/1
40 TABLET, DELAYED RELEASE ORAL
Refills: 0 | Status: DISCONTINUED | OUTPATIENT
Start: 2024-05-05 | End: 2024-05-05

## 2024-05-05 RX ORDER — TRANEXAMIC ACID 100 MG/ML
1000 INJECTION, SOLUTION INTRAVENOUS ONCE
Refills: 0 | Status: COMPLETED | OUTPATIENT
Start: 2024-05-05 | End: 2024-05-05

## 2024-05-05 RX ORDER — AMLODIPINE BESYLATE 2.5 MG/1
2.5 TABLET ORAL DAILY
Refills: 0 | Status: DISCONTINUED | OUTPATIENT
Start: 2024-05-05 | End: 2024-05-05

## 2024-05-05 RX ORDER — CEFAZOLIN SODIUM 1 G
2000 VIAL (EA) INJECTION EVERY 8 HOURS
Refills: 0 | Status: COMPLETED | OUTPATIENT
Start: 2024-05-05 | End: 2024-05-06

## 2024-05-05 RX ORDER — OXYCODONE HYDROCHLORIDE 5 MG/1
10 TABLET ORAL EVERY 4 HOURS
Refills: 0 | Status: DISCONTINUED | OUTPATIENT
Start: 2024-05-05 | End: 2024-05-07

## 2024-05-05 RX ORDER — VANCOMYCIN HCL 1 G
1000 VIAL (EA) INTRAVENOUS EVERY 12 HOURS
Refills: 0 | Status: DISCONTINUED | OUTPATIENT
Start: 2024-05-05 | End: 2024-05-07

## 2024-05-05 RX ORDER — POLYETHYLENE GLYCOL 3350 17 G/17G
17 POWDER, FOR SOLUTION ORAL AT BEDTIME
Refills: 0 | Status: DISCONTINUED | OUTPATIENT
Start: 2024-05-05 | End: 2024-05-07

## 2024-05-05 RX ORDER — MAGNESIUM HYDROXIDE 400 MG/1
30 TABLET, CHEWABLE ORAL DAILY
Refills: 0 | Status: DISCONTINUED | OUTPATIENT
Start: 2024-05-05 | End: 2024-05-07

## 2024-05-05 RX ORDER — CEFTRIAXONE 500 MG/1
2000 INJECTION, POWDER, FOR SOLUTION INTRAMUSCULAR; INTRAVENOUS EVERY 24 HOURS
Refills: 0 | Status: DISCONTINUED | OUTPATIENT
Start: 2024-05-05 | End: 2024-05-05

## 2024-05-05 RX ORDER — ACETAMINOPHEN 500 MG
975 TABLET ORAL EVERY 8 HOURS
Refills: 0 | Status: DISCONTINUED | OUTPATIENT
Start: 2024-05-05 | End: 2024-05-07

## 2024-05-05 RX ORDER — QUETIAPINE FUMARATE 200 MG/1
12.5 TABLET, FILM COATED ORAL EVERY 8 HOURS
Refills: 0 | Status: DISCONTINUED | OUTPATIENT
Start: 2024-05-05 | End: 2024-05-05

## 2024-05-05 RX ORDER — FENTANYL CITRATE 50 UG/ML
25 INJECTION INTRAVENOUS
Refills: 0 | Status: DISCONTINUED | OUTPATIENT
Start: 2024-05-05 | End: 2024-05-05

## 2024-05-05 RX ORDER — SODIUM CHLORIDE 9 MG/ML
1000 INJECTION INTRAMUSCULAR; INTRAVENOUS; SUBCUTANEOUS
Refills: 0 | Status: DISCONTINUED | OUTPATIENT
Start: 2024-05-05 | End: 2024-05-05

## 2024-05-05 RX ORDER — TRAMADOL HYDROCHLORIDE 50 MG/1
50 TABLET ORAL EVERY 4 HOURS
Refills: 0 | Status: DISCONTINUED | OUTPATIENT
Start: 2024-05-05 | End: 2024-05-07

## 2024-05-05 RX ORDER — ONDANSETRON 8 MG/1
4 TABLET, FILM COATED ORAL EVERY 6 HOURS
Refills: 0 | Status: DISCONTINUED | OUTPATIENT
Start: 2024-05-05 | End: 2024-05-05

## 2024-05-05 RX ORDER — SODIUM CHLORIDE 9 MG/ML
1000 INJECTION, SOLUTION INTRAVENOUS
Refills: 0 | Status: DISCONTINUED | OUTPATIENT
Start: 2024-05-05 | End: 2024-05-05

## 2024-05-05 RX ORDER — CEFAZOLIN SODIUM 1 G
2000 VIAL (EA) INJECTION EVERY 8 HOURS
Refills: 0 | Status: DISCONTINUED | OUTPATIENT
Start: 2024-05-05 | End: 2024-05-05

## 2024-05-05 RX ORDER — LANOLIN ALCOHOL/MO/W.PET/CERES
3 CREAM (GRAM) TOPICAL AT BEDTIME
Refills: 0 | Status: DISCONTINUED | OUTPATIENT
Start: 2024-05-05 | End: 2024-05-05

## 2024-05-05 RX ORDER — OXYCODONE HYDROCHLORIDE 5 MG/1
5 TABLET ORAL EVERY 4 HOURS
Refills: 0 | Status: DISCONTINUED | OUTPATIENT
Start: 2024-05-05 | End: 2024-05-07

## 2024-05-05 RX ORDER — ASPIRIN/CALCIUM CARB/MAGNESIUM 324 MG
325 TABLET ORAL DAILY
Refills: 0 | Status: DISCONTINUED | OUTPATIENT
Start: 2024-05-06 | End: 2024-05-07

## 2024-05-05 RX ADMIN — RISPERIDONE 0.25 MILLIGRAM(S): 4 TABLET ORAL at 09:10

## 2024-05-05 RX ADMIN — FENTANYL CITRATE 25 MICROGRAM(S): 50 INJECTION INTRAVENOUS at 14:35

## 2024-05-05 RX ADMIN — SODIUM CHLORIDE 75 MILLILITER(S): 9 INJECTION INTRAMUSCULAR; INTRAVENOUS; SUBCUTANEOUS at 00:01

## 2024-05-05 RX ADMIN — Medication 975 MILLIGRAM(S): at 23:39

## 2024-05-05 RX ADMIN — CHLORHEXIDINE GLUCONATE 1 APPLICATION(S): 213 SOLUTION TOPICAL at 10:49

## 2024-05-05 RX ADMIN — TRANEXAMIC ACID 200 MILLIGRAM(S): 100 INJECTION, SOLUTION INTRAVENOUS at 12:00

## 2024-05-05 RX ADMIN — TRANEXAMIC ACID 200 MILLIGRAM(S): 100 INJECTION, SOLUTION INTRAVENOUS at 14:00

## 2024-05-05 RX ADMIN — Medication 2000 MILLIGRAM(S): at 23:39

## 2024-05-05 RX ADMIN — OXYCODONE HYDROCHLORIDE 10 MILLIGRAM(S): 5 TABLET ORAL at 18:44

## 2024-05-05 RX ADMIN — RISPERIDONE 0.25 MILLIGRAM(S): 4 TABLET ORAL at 23:39

## 2024-05-05 RX ADMIN — CEFTRIAXONE 2000 MILLIGRAM(S): 500 INJECTION, POWDER, FOR SOLUTION INTRAMUSCULAR; INTRAVENOUS at 22:40

## 2024-05-05 RX ADMIN — HYDROMORPHONE HYDROCHLORIDE 0.5 MILLIGRAM(S): 2 INJECTION INTRAMUSCULAR; INTRAVENOUS; SUBCUTANEOUS at 15:16

## 2024-05-05 RX ADMIN — FENTANYL CITRATE 25 MICROGRAM(S): 50 INJECTION INTRAVENOUS at 14:52

## 2024-05-05 RX ADMIN — AMLODIPINE BESYLATE 2.5 MILLIGRAM(S): 2.5 TABLET ORAL at 09:11

## 2024-05-05 RX ADMIN — FENTANYL CITRATE 25 MICROGRAM(S): 50 INJECTION INTRAVENOUS at 15:34

## 2024-05-05 RX ADMIN — Medication 250 MILLIGRAM(S): at 23:40

## 2024-05-05 NOTE — BRIEF OPERATIVE NOTE - OPERATION/FINDINGS
1) 5F Yueh advanced into subacromial space correlating to fluid described on prior CT. No fluid could be aspirated despite several attempts. space lavaged with sterile NS for C+S  2) Contrast injection into skin defect reveal no apparent continuity with joint space. MRI would be more definitive to exclude cutaneous fistula to joint space
Poly liner exchange, I&D R rTSA

## 2024-05-05 NOTE — DISCHARGE NOTE PROVIDER - HOSPITAL COURSE
The patient is a 88 year old male status post washout of a PJI s/p rTSA 9/20/23 after being admitted through [facility] Emergency Room. The Patient was medically Optimized for the Previously mentioned surgical procedure. The patient was taken to the operating room on date mentioned above. Prophylactic antibiotics were started before the procedure and continued for 24 hours.  There were no complications during the procedure and patient tolerated the procedure well.  The patient was transferred to recovery room in stable condition and subsequently to surgical floor.  Patient was placed on Aspirin for anticoagulation.  All home medications were continued.  The patient received physical therapy daily and daily labs were followed.  The dressing was kept clean, dry and intact. ID was consulted for antibiotic and prophylactic recommendations. The rest of the hospital stay was unremarkable The patient is a 88 year old male status post washout of a PJI s/p rTSA 9/20/23 after being admitted through [facility] Emergency Room. The Patient was medically Optimized for the Previously mentioned surgical procedure. The patient was taken to the operating room on date mentioned above. Prophylactic antibiotics were started before the procedure and continued for 24 hours.  There were no complications during the procedure and patient tolerated the procedure well.  The patient was transferred to recovery room in stable condition and subsequently to surgical floor.  Patient was placed on Aspirin for anticoagulation.  All home medications were continued.  The patient received physical therapy daily and daily labs were followed.  The dressing was kept clean, dry and intact. ID was consulted for antibiotic and prophylactic recommendations. PICC Line placement while inpatient. The rest of the hospital stay was unremarkable Orthopedic Summary  H&P:  Pt is a 88y Male PAST MEDICAL & SURGICAL HISTORY:  Nephrolithiasis      Osteoarthritis      Osteoarthritis of right shoulder      Kidney stone      Hematuria      Skin cancer      HTN (hypertension)      Gout      S/P ORIF (open reduction internal fixation) fracture  RLE/ jaw      H/O lithotripsy      H/O right inguinal hernia repair      History of total bilateral knee replacement      History of removal of retained hardware      S/P tonsillectomy      S/P hip replacement, left        Now s/p I&D head and liner exchange for Right Total shoulder Infection. Pt is afebrile with stable vital signs. Pain is controlled. Exam reveals intact motor and sensory function**. Dressing is clean and dry with a new bandage on.      Hospital Course:  Patient presented to [facility] ED with infection of above and admitted. ID was consulted for co-management. Pt underwent medically/cardiac risk stratification prior to the surgical procedure. Antibiotics were managed by ID. They were admitted after surgery to the medical floor.   There were no complications during the hospital stay. All home medications were continued.    Consults were obtained from, Anticoagulation Team for DVT/PE prophylaxis, and followed by Medicine for Medical Co-management. Patient was placed on _______ * for antibiotics which were started promptly after OR cultures were taken.  Pertinent home medications were continued.  Daily labs were followed.  Once cultures resulted, antibiotics were tailored and Pt received a PICC line for long term antibiotics per ID.    The plan for DC to [facility] with IV antibiotic therapy when bed available.  The orthopedic Attending is aware and agrees. See addendum to DC summary per medical team below for any additional info or if any changes.     Orthopedic Summary  H&P:  Pt is a 88y Male PAST MEDICAL & SURGICAL HISTORY:  Nephrolithiasis      Osteoarthritis      Osteoarthritis of right shoulder      Kidney stone      Hematuria      Skin cancer      HTN (hypertension)      Gout      S/P ORIF (open reduction internal fixation) fracture  RLE/ jaw      H/O lithotripsy      H/O right inguinal hernia repair      History of total bilateral knee replacement      History of removal of retained hardware      S/P tonsillectomy      S/P hip replacement, left        Now s/p I&D head and liner exchange for Right Total shoulder Infection. Pt is afebrile with stable vital signs. Pain is controlled. Exam reveals intact motor and sensory function. Dressing is clean and dry with a new bandage on.      Hospital Course:  Patient presented to [facility] ED with infection of above and admitted. ID was consulted for co-management. Pt underwent medically/cardiac risk stratification prior to the surgical procedure. Antibiotics were managed by ID. They were admitted after surgery to the medical floor.   There were no complications during the hospital stay. All home medications were continued.    Consults were obtained from, Anticoagulation Team for DVT/PE prophylaxis, and followed by Medicine for Medical Co-management. Patient was placed on Vancomycin and Rocephin for antibiotics which were started promptly after OR cultures were taken.  Pertinent home medications were continued.  Daily labs were followed.  Once cultures resulted, antibiotics were tailored and Pt received a PICC line for long term antibiotics per ID.    The plan for DC to [facility] with IV antibiotic therapy when bed available.  The orthopedic Attending is aware and agrees. See addendum to DC summary per medical team below for any additional info or if any changes.

## 2024-05-05 NOTE — DISCHARGE NOTE PROVIDER - NSDCCPTREATMENT_GEN_ALL_CORE_FT
PRINCIPAL PROCEDURE  Procedure: Irrigation and debridement of shoulder or clavicle  Findings and Treatment: Right reverse TSA w poly exchange

## 2024-05-05 NOTE — CONSULT NOTE ADULT - SUBJECTIVE AND OBJECTIVE BOX
Patient is a 88y old  Male who presents with a chief complaint of Right shoulder wound draining with recent reversal of Right TSA. (04 May 2024 22:37)    HPI:  Patient is a 88yMale LHD who presents to [place] ED w/ a c/o of right shoulder pain. Patient states that he developed R shoulder pain for the past few days. He has a history of R rTSA with Dr. Macias in September 2023. He denies any trauma. Denies HS/LOC. Denies any numbness or tingling. Denies having any other pain elsewhere. Patient denies any fever or chills. Denies any other previous orthopedic history. No other orthopedic concerns at this time. Had MRI, eval by ortho, concern for R prosthetic joint infection, plan for OR for debridement.      PMH:    Nephrolithiasis    Osteoarthritis    Osteoarthritis of right shoulder    Kidney stone    Hematuria    Skin cancer    HTN (hypertension)    Gout      PMH: as above  PSH: as above    Meds: per reconciliation sheet, noted below  MEDICATIONS  (STANDING):  acetaminophen     Tablet .. 975 milliGRAM(s) Oral every 8 hours  amLODIPine   Tablet 2.5 milliGRAM(s) Oral daily  ascorbic acid 500 milliGRAM(s) Oral two times a day  ceFAZolin  Injectable. 2000 milliGRAM(s) IV Push every 8 hours  cefTRIAXone   IVPB 2000 milliGRAM(s) IV Intermittent every 24 hours  folic acid 1 milliGRAM(s) Oral daily  influenza  Vaccine (HIGH DOSE) 0.7 milliLiter(s) IntraMuscular once  multivitamin 1 Tablet(s) Oral daily  pantoprazole    Tablet 40 milliGRAM(s) Oral before breakfast  risperiDONE   Tablet 0.25 milliGRAM(s) Oral two times a day  vancomycin  IVPB 1000 milliGRAM(s) IV Intermittent every 12 hours      Allergies    tramadol (Unknown)  lactose (Unknown)  Celebrex (Unknown)    Intolerances      Social: no smoking, no alcohol, no illegal drugs; no recent travel, no exposure to TB  FAMILY HISTORY:  Family history of breast cancer (Mother, Sibling)       no history of premature cardiovascular disease in first degree relatives    ROS: the patient denies fever, no chills, no HA, no dizziness, no sore throat, no blurry vision, no CP, no palpitations, no SOB, no cough, no abdominal pain, no diarrhea, no N/V, no dysuria, no leg pain, no claudication, no rash, no joint aches, no rectal pain or bleeding, no night sweats R shoulder pain    All other systems reviewed and are negative    Vital Signs Last 24 Hrs  T(C): 36.8 (05 May 2024 15:55), Max: 36.8 (05 May 2024 08:57)  T(F): 98.2 (05 May 2024 15:55), Max: 98.2 (05 May 2024 08:57)  HR: 79 (05 May 2024 15:55) (75 - 92)  BP: 129/79 (05 May 2024 15:55) (115/69 - 136/86)  BP(mean): --  RR: 16 (05 May 2024 15:55) (14 - 19)  SpO2: 100% (05 May 2024 15:55) (96% - 100%)    Parameters below as of 05 May 2024 15:55  Patient On (Oxygen Delivery Method): nasal cannula  O2 Flow (L/min): 2    Daily     Daily     PE:  Constitutional: NAD  HEENT: NC/AT, EOMI, PERRLA, conjunctivae clear; ears and nose atraumatic; pharynx benign  Neck: supple; thyroid not palpable  Back: no tenderness  Respiratory: respiratory effort normal; clear to auscultation  Cardiovascular: S1S2 regular, no murmurs  Abdomen: soft, not tender, not distended, positive BS; liver and spleen WNL  Genitourinary: no suprapubic tenderness  Lymphatic: no LN palpable  Musculoskeletal: no muscle tenderness, no joint swelling or tenderness  Extremities: no pedal edema  Neurological/ Psychiatric: AxOx3, Judgement and insight normal;  moving all extremities  Skin: no rashes; no palpable lesions R shoulder tenderness     Labs: all available labs reviewed                        11.8   10.42 )-----------( 280      ( 05 May 2024 14:48 )             37.2     05-05    141  |  110<H>  |  17  ----------------------------<  133<H>  4.2   |  27  |  1.08    Ca    8.7      05 May 2024 14:48         Urinalysis Basic - ( 05 May 2024 14:48 )    Color: x / Appearance: x / SG: x / pH: x  Gluc: 133 mg/dL / Ketone: x  / Bili: x / Urobili: x   Blood: x / Protein: x / Nitrite: x   Leuk Esterase: x / RBC: x / WBC x   Sq Epi: x / Non Sq Epi: x / Bacteria: x          Radiology: all available radiological tests reviewed  < from: MR Shoulder Joint w/wo IV Cont, Right (05.04.24 @ 13:44) >    IMPRESSION:  Status post reverse total shoulder arthroplasty with adjacent   susceptibility artifact. There is a large glenohumeral joint effusion   which appears to connect to a soft tissue defect within the anterior soft   tissues with marked postcontrast enhancement. This concerning for septic   joint infection.    --- End of Report ---    < end of copied text >    Advanced directives addressed: full resuscitation

## 2024-05-05 NOTE — DISCHARGE NOTE PROVIDER - NSDCCPCAREPLAN_GEN_ALL_CORE_FT
PRINCIPAL DISCHARGE DIAGNOSIS  Diagnosis: Infection of prosthetic shoulder joint  Assessment and Plan of Treatment: Right reverse TSA

## 2024-05-05 NOTE — DISCHARGE NOTE PROVIDER - NSDCMRMEDTOKEN_GEN_ALL_CORE_FT
acetaminophen 325 mg oral tablet: 2 tab(s) orally every 6 hours As needed Mild Pain (1 - 3)  amLODIPine 2.5 mg oral tablet: 1 tab(s) orally once a day Hold for systolic BP &lt;110  aspirin 81 mg oral tablet: 1 tab(s) orally once a day  colchicine 0.6 mg oral tablet: 1 tab(s) orally once a day x2 days ONLY  melatonin 3 mg oral tablet: 1 tab(s) orally once a day (at bedtime) As needed Insomnia  polyethylene glycol 3350 oral powder for reconstitution: 17 gram(s) orally once a day  predniSONE 10 mg oral tablet: 1 tab(s) orally once a day x3 more days  Protonix 40 mg oral delayed release tablet: 1 tab(s) orally once a day  QUEtiapine: 12.5 milligram(s) orally every 8 hours as needed for  anxiety  risperiDONE 0.25 mg oral tablet: 1 tab(s) orally 2 times a day   acetaminophen 325 mg oral tablet: 2 tab(s) orally every 6 hours As needed Mild Pain (1 - 3)  amLODIPine 2.5 mg oral tablet: 1 tab(s) orally once a day Hold for systolic BP &lt;110  aspirin 325 mg oral tablet: 1 tab(s) orally once a day  cefTRIAXone 2 g injection: 2 gram(s) intravenously once a day x 40 days q24 hrs via PICC until 6/16/24 per ID  Colace 100 mg oral capsule: 1 cap(s) orally 2 times a day  colchicine 0.6 mg oral tablet: 1 tab(s) orally once a day x2 days ONLY  melatonin 3 mg oral tablet: 1 tab(s) orally once a day (at bedtime) As needed Insomnia  oxyCODONE 5 mg oral tablet: 1 tab(s) orally every 6 hours as needed for  moderate pain MDD: 4  polyethylene glycol 3350 oral powder for reconstitution: 17 gram(s) orally once a day as needed for  constipation  Protonix 40 mg oral delayed release tablet: 1 tab(s) orally once a day  QUEtiapine: 12.5 milligram(s) orally every 8 hours as needed for  anxiety  risperiDONE 0.25 mg oral tablet: 1 tab(s) orally 2 times a day  vancomycin 1 g intravenous injection: 1 gram(s) intravenous every 12 hours via PICC until 6/16/24

## 2024-05-05 NOTE — DISCHARGE NOTE PROVIDER - NSDCFUSCHEDAPPT_GEN_ALL_CORE_FT
Sergei Macias  [facility]  ORTHOSURG [address]  Scheduled Appointment: 06/10/2024

## 2024-05-05 NOTE — DISCHARGE NOTE PROVIDER - CARE PROVIDERS DIRECT ADDRESSES
[email or web address]

## 2024-05-05 NOTE — DISCHARGE NOTE PROVIDER - CARE PROVIDER_API CALL
Sergei Macias  Orthopaedic Surgery  [address], [address]  [place] [address]  Phone: [phone]  Fax: [phone]  Follow Up Time:

## 2024-05-05 NOTE — DISCHARGE NOTE PROVIDER - NSDCFUADDINST_GEN_ALL_CORE_FT
Total Shoulder Discharge Instructions     1. Activity: No Aggressive ROM until cleared by Dr. Macias. Maintain sling at all times EXCEPT to straighten elbow a few times daily. Elevate hand and wiggle fingers.   2. Call with fever over 101, wound redness, drainage.  3. Wound care: Do not remove or change dressing unless saturated.  IF so, apply dry gauze, tegaderm.   4. Continue to apply ICE packs.  5. DVT PE Prophylaxis: Aspirin 325 daily for 30 days  6. Follow Up: Orthopedics, Dr. Macias 10-14 days in office. Call office to schedule appointment. If going home, eRx sent to your pharmacy for .  7. Follow Up: With Infectious Disease Dr. Barajas

## 2024-05-05 NOTE — PROGRESS NOTE ADULT - ATTENDING COMMENTS
Orthopedic Sports Attending:    Agree with above resident/PA note.  Note edited where necessary.      Patient seen and examined at bedside. Discussed the risks, complications, benefits and alternatives of care. Confirmed procedure and site. Patient fully understands and would like to proceed with surgery. Given the MRI, plan will be a one stage poly exahnge, I&D and possible cement spacer.     Sergei Macias, DO  Orthopaedic Surgery

## 2024-05-05 NOTE — CONSULT NOTE ADULT - ASSESSMENT
Patient is a 88yMale LHD who presents to [place] ED w/ a c/o of right shoulder pain. Patient states that he developed R shoulder pain for the past few days. He has a history of R rTSA with Dr. Macias in September 2023. He denies any trauma. Denies HS/LOC. Denies any numbness or tingling. Denies having any other pain elsewhere. Patient denies any fever or chills. Denies any other previous orthopedic history. No other orthopedic concerns at this time. Had MRI, eval by ortho, concern for R prosthetic joint infection, plan for OR for debridement.      1. R prosthetic joint infection s/p R reverse TSA  - imaging reviewed  - ortho eval noted, plan for OR today for debridement/fidencio  - fu blood cx OR cx  - post OR will start vancomycin 2ptg06o check trough prior to 4th dose and rocephin 2gm daily  - continue with abx post op  - monitor tmeps  - tolerating abx well so far; no side effects noted  - reason for abx use and side effects reviewed with patient  - supportive care  - fu cbc    - IV to PO abx token not applicable will require IV abx course

## 2024-05-06 ENCOUNTER — APPOINTMENT (OUTPATIENT)
Dept: ORTHOPEDIC SURGERY | Facility: CLINIC | Age: 89
End: 2024-05-06

## 2024-05-06 LAB
ANION GAP SERPL CALC-SCNC: 7 MMOL/L — SIGNIFICANT CHANGE UP (ref 5–17)
BUN SERPL-MCNC: 23 MG/DL — SIGNIFICANT CHANGE UP (ref 7–23)
CALCIUM SERPL-MCNC: 9.3 MG/DL — SIGNIFICANT CHANGE UP (ref 8.5–10.1)
CHLORIDE SERPL-SCNC: 108 MMOL/L — SIGNIFICANT CHANGE UP (ref 96–108)
CO2 SERPL-SCNC: 24 MMOL/L — SIGNIFICANT CHANGE UP (ref 22–31)
CREAT SERPL-MCNC: 1.29 MG/DL — SIGNIFICANT CHANGE UP (ref 0.5–1.3)
CRP SERPL-MCNC: 32 MG/L — HIGH
EGFR: 54 ML/MIN/1.73M2 — LOW
GLUCOSE SERPL-MCNC: 134 MG/DL — HIGH (ref 70–99)
GRAM STN FLD: ABNORMAL
GRAM STN FLD: ABNORMAL
HCT VFR BLD CALC: 37.8 % — LOW (ref 39–50)
HGB BLD-MCNC: 12.1 G/DL — LOW (ref 13–17)
MCHC RBC-ENTMCNC: 28.2 PG — SIGNIFICANT CHANGE UP (ref 27–34)
MCHC RBC-ENTMCNC: 32 GM/DL — SIGNIFICANT CHANGE UP (ref 32–36)
MCV RBC AUTO: 88.1 FL — SIGNIFICANT CHANGE UP (ref 80–100)
NIGHT BLUE STAIN TISS: SIGNIFICANT CHANGE UP
PLATELET # BLD AUTO: 390 K/UL — SIGNIFICANT CHANGE UP (ref 150–400)
POTASSIUM SERPL-MCNC: 4.2 MMOL/L — SIGNIFICANT CHANGE UP (ref 3.5–5.3)
POTASSIUM SERPL-SCNC: 4.2 MMOL/L — SIGNIFICANT CHANGE UP (ref 3.5–5.3)
RBC # BLD: 4.29 M/UL — SIGNIFICANT CHANGE UP (ref 4.2–5.8)
RBC # FLD: 15.7 % — HIGH (ref 10.3–14.5)
SODIUM SERPL-SCNC: 139 MMOL/L — SIGNIFICANT CHANGE UP (ref 135–145)
SPECIMEN SOURCE: SIGNIFICANT CHANGE UP
WBC # BLD: 12.58 K/UL — HIGH (ref 3.8–10.5)
WBC # FLD AUTO: 12.58 K/UL — HIGH (ref 3.8–10.5)

## 2024-05-06 PROCEDURE — 99233 SBSQ HOSP IP/OBS HIGH 50: CPT

## 2024-05-06 RX ADMIN — Medication 500 MILLIGRAM(S): at 22:20

## 2024-05-06 RX ADMIN — RISPERIDONE 0.25 MILLIGRAM(S): 4 TABLET ORAL at 22:20

## 2024-05-06 RX ADMIN — RISPERIDONE 0.25 MILLIGRAM(S): 4 TABLET ORAL at 10:58

## 2024-05-06 RX ADMIN — Medication 975 MILLIGRAM(S): at 15:13

## 2024-05-06 RX ADMIN — Medication 2000 MILLIGRAM(S): at 05:17

## 2024-05-06 RX ADMIN — Medication 1 TABLET(S): at 10:58

## 2024-05-06 RX ADMIN — Medication 325 MILLIGRAM(S): at 10:58

## 2024-05-06 RX ADMIN — Medication 975 MILLIGRAM(S): at 22:21

## 2024-05-06 RX ADMIN — Medication 250 MILLIGRAM(S): at 22:21

## 2024-05-06 RX ADMIN — Medication 3 MILLIGRAM(S): at 22:22

## 2024-05-06 RX ADMIN — Medication 250 MILLIGRAM(S): at 10:58

## 2024-05-06 RX ADMIN — Medication 1 MILLIGRAM(S): at 10:58

## 2024-05-06 RX ADMIN — CEFTRIAXONE 2000 MILLIGRAM(S): 500 INJECTION, POWDER, FOR SOLUTION INTRAMUSCULAR; INTRAVENOUS at 22:21

## 2024-05-06 RX ADMIN — AMLODIPINE BESYLATE 2.5 MILLIGRAM(S): 2.5 TABLET ORAL at 10:57

## 2024-05-06 RX ADMIN — Medication 500 MILLIGRAM(S): at 10:58

## 2024-05-06 NOTE — OCCUPATIONAL THERAPY INITIAL EVALUATION ADULT - PERTINENT HX OF CURRENT PROBLEM, REHAB EVAL
Per EMR pt is a 89 y/o male who presents to [place] ED w/ a c/o of right shoulder pain. Patient states that he developed R shoulder pain for the past few days. He has a history of R rTSA with Dr. Macias in September 2023. He denies any trauma. Denies HS/LOC. Denies any numbness or tingling. Denies having any other pain elsewhere. Patient denies any fever or chills. Denies any other previous orthopedic history. No other orthopedic concerns at this time. Had MRI, eval by ortho, concern for R prosthetic joint infection, plan for OR for debridement.  Pt is now s/p head/liner exchange for a R rTSA PJI 5/5/2024.

## 2024-05-06 NOTE — PHYSICAL THERAPY INITIAL EVALUATION ADULT - ADDITIONAL COMMENTS
Patient was ambulating with no AD Independently at home PTA. Patient was driving and fully Independent with all ADL Performance at home as patient is left handed. Patient denied any recent falls.

## 2024-05-06 NOTE — PHYSICAL THERAPY INITIAL EVALUATION ADULT - PLANNED THERAPY INTERVENTIONS, PT EVAL
NO Further Skilled Acute Care Physical Therapy Needs at this time as patient is already ambulating at baseline Independent functional level. Safe to ambulate ad audrey. Safe to d/c home today. Will d/c patient off Physical Therapy Program and sign off at this time.

## 2024-05-06 NOTE — OCCUPATIONAL THERAPY INITIAL EVALUATION ADULT - NSOTDISCHREC_GEN_A_CORE
in house OT services vs outpatient OT once cleared by MD for shoulder rehab, increased assist at [facility] for ADL tasks

## 2024-05-06 NOTE — PHYSICAL THERAPY INITIAL EVALUATION ADULT - GENERAL OBSERVATIONS, REHAB EVAL
Pt rec'd sitting OOB in chair with RUE Sling NOT intact. Patient denied any pain or discomfort. Patient refusing to use RUE Sling. Patient reports more discomfort with Sling Intact

## 2024-05-06 NOTE — OCCUPATIONAL THERAPY INITIAL EVALUATION ADULT - COGNITIVE, VISUAL PERCEPTUAL, OT EVAL
Pt will recall and adhere to RUE WB and ROM precautions with 100% accuracy in 3-5 tx sessions in order to enhance safety w/ ADL participation.

## 2024-05-06 NOTE — PHYSICAL THERAPY INITIAL EVALUATION ADULT - ACTIVE RANGE OF MOTION EXAMINATION, REHAB EVAL
Except RUE ROM NOT Tested due to RUE NWB Immobilization Status/bilateral upper extremity Active ROM was WFL (within functional limits)/bilateral  lower extremity Active ROM was WFL (within functional limits)

## 2024-05-06 NOTE — PHYSICAL THERAPY INITIAL EVALUATION ADULT - DID THE PATIENT HAVE SURGERY?
s/p dry IR Arthrocentesis of R Shoulder on 5/3 and then OR 5/5 for R Prosthetic Joint Infection I&D/yes

## 2024-05-06 NOTE — OCCUPATIONAL THERAPY INITIAL EVALUATION ADULT - ADDITIONAL COMMENTS
Info obtained from OT IE from 9/21/23, reconfirmed this admisson- pt reports living alone with 2 steps to enter, no steps inside, walk in shower with bench, GB, RTS IND prior Per [facility] pt resides in a [facility]- pt reports he is a senior living complex similar to a condo, pt (I) with ADL and fxl mobility tasks, +elevator assess, ELENI performs IADL tasks. Pt walked without AD. LHD for most tasks, RHD for pericare. Pt has a walk in shower, grab bars by the toilet.

## 2024-05-06 NOTE — OCCUPATIONAL THERAPY INITIAL EVALUATION ADULT - ADL RETRAINING, OT EVAL
Pt will perform UBD (including donning/doffing sling) with minimal assist in 3-5 tx sessions. Pt will perform LBD and toileting with CGA in 3-5 tx sessions.

## 2024-05-06 NOTE — PHYSICAL THERAPY INITIAL EVALUATION ADULT - LIVES WITH, PROFILE
Pt lives in a [facility] senior living condo with both elevator and stairs access. patient's condo is on the 3rd floor

## 2024-05-06 NOTE — OCCUPATIONAL THERAPY INITIAL EVALUATION ADULT - GENERAL OBSERVATIONS, REHAB EVAL
Pt rec'd/left seated EOB, bed alarmed, +drain, +IV infusing, +sling and pillow supporting RUE, CBIR, lines intact, needs met.

## 2024-05-06 NOTE — PROVIDER CONTACT NOTE (CRITICAL VALUE NOTIFICATION) - TEST AND RESULT REPORTED:
Tissue culture gram stain 5/5 ref#[id number] rare Staph Aureus. ref#[id number] rare Staph Aureus [Malaise] : malaise [Nasal Congestion] : nasal congestion [Wheezing] : wheezing [Cough] : cough [Negative] : Genitourinary

## 2024-05-06 NOTE — OCCUPATIONAL THERAPY INITIAL EVALUATION ADULT - NSACTIVITYREC_GEN_A_OT
Pt presents with impaired balance, endurance and muscle strength that will benefit from skilled OT to improve independence in ADLs, reduce fall risk and chance for readmission. Pt educated on ROM and WBP for RUE and impact on ADL/fxl transfers, handout provided to promote carryover.

## 2024-05-07 ENCOUNTER — TRANSCRIPTION ENCOUNTER (OUTPATIENT)
Age: 89
End: 2024-05-07

## 2024-05-07 VITALS
OXYGEN SATURATION: 97 % | TEMPERATURE: 98 F | HEART RATE: 72 BPM | DIASTOLIC BLOOD PRESSURE: 79 MMHG | RESPIRATION RATE: 18 BRPM | SYSTOLIC BLOOD PRESSURE: 138 MMHG

## 2024-05-07 LAB
-  CLINDAMYCIN: SIGNIFICANT CHANGE UP
-  ERYTHROMYCIN: SIGNIFICANT CHANGE UP
-  GENTAMICIN: SIGNIFICANT CHANGE UP
-  OXACILLIN: SIGNIFICANT CHANGE UP
-  PENICILLIN: SIGNIFICANT CHANGE UP
-  RIFAMPIN: SIGNIFICANT CHANGE UP
-  TETRACYCLINE: SIGNIFICANT CHANGE UP
-  TRIMETHOPRIM/SULFAMETHOXAZOLE: SIGNIFICANT CHANGE UP
-  VANCOMYCIN: SIGNIFICANT CHANGE UP
ANION GAP SERPL CALC-SCNC: 5 MMOL/L — SIGNIFICANT CHANGE UP (ref 5–17)
BUN SERPL-MCNC: 21 MG/DL — SIGNIFICANT CHANGE UP (ref 7–23)
CALCIUM SERPL-MCNC: 9 MG/DL — SIGNIFICANT CHANGE UP (ref 8.5–10.1)
CHLORIDE SERPL-SCNC: 110 MMOL/L — HIGH (ref 96–108)
CO2 SERPL-SCNC: 27 MMOL/L — SIGNIFICANT CHANGE UP (ref 22–31)
CREAT SERPL-MCNC: 1.09 MG/DL — SIGNIFICANT CHANGE UP (ref 0.5–1.3)
EGFR: 66 ML/MIN/1.73M2 — SIGNIFICANT CHANGE UP
GLUCOSE SERPL-MCNC: 92 MG/DL — SIGNIFICANT CHANGE UP (ref 70–99)
GRAM STN FLD: ABNORMAL
HCT VFR BLD CALC: 37.3 % — LOW (ref 39–50)
HGB BLD-MCNC: 11.8 G/DL — LOW (ref 13–17)
MCHC RBC-ENTMCNC: 27.8 PG — SIGNIFICANT CHANGE UP (ref 27–34)
MCHC RBC-ENTMCNC: 31.6 GM/DL — LOW (ref 32–36)
MCV RBC AUTO: 88 FL — SIGNIFICANT CHANGE UP (ref 80–100)
METHOD TYPE: SIGNIFICANT CHANGE UP
PLATELET # BLD AUTO: 324 K/UL — SIGNIFICANT CHANGE UP (ref 150–400)
POTASSIUM SERPL-MCNC: 4.1 MMOL/L — SIGNIFICANT CHANGE UP (ref 3.5–5.3)
POTASSIUM SERPL-SCNC: 4.1 MMOL/L — SIGNIFICANT CHANGE UP (ref 3.5–5.3)
RBC # BLD: 4.24 M/UL — SIGNIFICANT CHANGE UP (ref 4.2–5.8)
RBC # FLD: 15.9 % — HIGH (ref 10.3–14.5)
SODIUM SERPL-SCNC: 142 MMOL/L — SIGNIFICANT CHANGE UP (ref 135–145)
SURGICAL PATHOLOGY STUDY: SIGNIFICANT CHANGE UP
VANCOMYCIN TROUGH SERPL-MCNC: 15.8 UG/ML — SIGNIFICANT CHANGE UP (ref 10–20)
WBC # BLD: 8.09 K/UL — SIGNIFICANT CHANGE UP (ref 3.8–10.5)
WBC # FLD AUTO: 8.09 K/UL — SIGNIFICANT CHANGE UP (ref 3.8–10.5)

## 2024-05-07 PROCEDURE — 36573 INSJ PICC RS&I 5 YR+: CPT

## 2024-05-07 RX ORDER — DOCUSATE SODIUM 100 MG
1 CAPSULE ORAL
Qty: 14 | Refills: 0
Start: 2024-05-07 | End: 2024-05-13

## 2024-05-07 RX ORDER — ASPIRIN/CALCIUM CARB/MAGNESIUM 324 MG
1 TABLET ORAL
Qty: 30 | Refills: 0
Start: 2024-05-07 | End: 2024-06-05

## 2024-05-07 RX ORDER — CEFTRIAXONE 500 MG/1
0 INJECTION, POWDER, FOR SOLUTION INTRAMUSCULAR; INTRAVENOUS
Qty: 0 | Refills: 0 | DISCHARGE
Start: 2024-05-07

## 2024-05-07 RX ORDER — VANCOMYCIN HCL 1 G
1 VIAL (EA) INTRAVENOUS
Qty: 0 | Refills: 0 | DISCHARGE
Start: 2024-05-07 | End: 2024-06-16

## 2024-05-07 RX ORDER — POLYETHYLENE GLYCOL 3350 17 G/17G
17 POWDER, FOR SOLUTION ORAL
Qty: 0 | Refills: 0 | DISCHARGE

## 2024-05-07 RX ORDER — OXYCODONE HYDROCHLORIDE 5 MG/1
1 TABLET ORAL
Qty: 28 | Refills: 0
Start: 2024-05-07 | End: 2024-05-13

## 2024-05-07 RX ORDER — ASPIRIN/CALCIUM CARB/MAGNESIUM 324 MG
1 TABLET ORAL
Qty: 0 | Refills: 0 | DISCHARGE

## 2024-05-07 RX ORDER — CEFTRIAXONE 500 MG/1
2 INJECTION, POWDER, FOR SOLUTION INTRAMUSCULAR; INTRAVENOUS
Qty: 80 | Refills: 0
Start: 2024-05-07 | End: 2024-06-15

## 2024-05-07 RX ADMIN — OXYCODONE HYDROCHLORIDE 5 MILLIGRAM(S): 5 TABLET ORAL at 11:18

## 2024-05-07 RX ADMIN — OXYCODONE HYDROCHLORIDE 5 MILLIGRAM(S): 5 TABLET ORAL at 10:24

## 2024-05-07 RX ADMIN — Medication 325 MILLIGRAM(S): at 10:23

## 2024-05-07 RX ADMIN — Medication 500 MILLIGRAM(S): at 10:22

## 2024-05-07 RX ADMIN — RISPERIDONE 0.25 MILLIGRAM(S): 4 TABLET ORAL at 10:23

## 2024-05-07 RX ADMIN — Medication 975 MILLIGRAM(S): at 13:59

## 2024-05-07 RX ADMIN — Medication 1 TABLET(S): at 10:23

## 2024-05-07 RX ADMIN — Medication 250 MILLIGRAM(S): at 10:26

## 2024-05-07 RX ADMIN — Medication 975 MILLIGRAM(S): at 06:12

## 2024-05-07 RX ADMIN — Medication 1 MILLIGRAM(S): at 10:23

## 2024-05-07 RX ADMIN — AMLODIPINE BESYLATE 2.5 MILLIGRAM(S): 2.5 TABLET ORAL at 10:23

## 2024-05-07 RX ADMIN — PANTOPRAZOLE SODIUM 40 MILLIGRAM(S): 20 TABLET, DELAYED RELEASE ORAL at 06:13

## 2024-05-07 NOTE — PROGRESS NOTE ADULT - ASSESSMENT
Patient is a 88yMale LHD who presents to [place] ED w/ a c/o of right shoulder pain. Patient states that he developed R shoulder pain for the past few days. He has a history of R rTSA with Dr. Macias in September 2023. He denies any trauma. Denies HS/LOC. Denies any numbness or tingling. Denies having any other pain elsewhere. Patient denies any fever or chills. Denies any other previous orthopedic history. No other orthopedic concerns at this time. Had MRI, eval by ortho, concern for R prosthetic joint infection, plan for OR for debridement.      1. R prosthetic joint infection s/p R reverse TSA  - imaging reviewed  - ortho eval noted, s/p head/liner exchange for a R rTSA PJI 5/5/2024  - fu blood cx no growth f/u OR cultures   - on vancomycin 1ilm34o check trough prior to 4th dose and rocephin 2gm daily #2   - will require PICC line 6 weeks of above abx - vanco [id number]/rocephin 2gm daily until 6/16/2024   - case d/w cm  - monitor tmeps  - tolerating abx well so far; no side effects noted  - reason for abx use and side effects reviewed with patient  - supportive care  - fu cbc    - IV to PO abx token not applicable will require IV abx course 
Patient is a 88yMale LHD who presents to [place] ED w/ a c/o of right shoulder pain. Patient states that he developed R shoulder pain for the past few days. He has a history of R rTSA with Dr. Macias in September 2023. He denies any trauma. Denies HS/LOC. Denies any numbness or tingling. Denies having any other pain elsewhere. Patient denies any fever or chills. Denies any other previous orthopedic history. No other orthopedic concerns at this time. Had MRI, eval by ortho, concern for R prosthetic joint infection, plan for OR for debridement.      1. R prosthetic joint infection s/p R reverse TSA  - imaging reviewed  - ortho eval noted, s/p head/liner exchange for a R rTSA PJI 5/5/2024  - fu blood cx no growth f/u OR cultures growing STAU f/u sensitivities    - on vancomycin 1nop04u trough therapeutic and rocephin 2gm daily #3  - will require PICC line 6 weeks of above abx - vanco [id number]/rocephin 2gm daily until 6/16/2024   - monitor tmeps  - tolerating abx well so far; no side effects noted  - reason for abx use and side effects reviewed with patient  - supportive care  - fu cbc    - IV to PO abx token not applicable will require IV abx course

## 2024-05-07 NOTE — DISCHARGE NOTE NURSING/CASE MANAGEMENT/SOCIAL WORK - NSDCPEFALRISK_GEN_ALL_CORE
For information on Fall & Injury Prevention, visit: [email or web address] OR  [email or web address] OR  https://www.cdc.gov/steadi/patient.html

## 2024-05-07 NOTE — DISCHARGE NOTE NURSING/CASE MANAGEMENT/SOCIAL WORK - PATIENT PORTAL LINK FT
You can access the FollowMyHealth Patient Portal offered by [facility] by registering at the following website: [email or web address]. By joining [facility]’s FollowMyHealth portal, you will also be able to view your health information using other applications (apps) compatible with our system.

## 2024-05-07 NOTE — ADVANCED PRACTICE NURSE CONSULT - ASSESSMENT
received pt on stretcher for procedure, chart reviewed prior to insertion and consent on the chart, limited to LUE due to HX, attempted by first team member for the basilic vein, unable to access due to vein collapsing, 2nd attempt by another team member for the brachial vein, vein access with good blood return and wire inserted without difficulty, when inserting catheter only able to advance catheter approximately 15cm, reposition and reattempted at new site for brachial vein and again able to access vein with good blood return and insert wire without difficulty, again only able to insert catheter approximately now 20cm, minimal blood loss, pt tolerated the procedure well, call and placed order for IR, pt and district RN aware,

## 2024-05-07 NOTE — PROGRESS NOTE ADULT - PROVIDER SPECIALTY LIST ADULT
Hospitalist
Orthopedics
Orthopedics
Infectious Disease
Orthopedics
Infectious Disease

## 2024-05-07 NOTE — PROGRESS NOTE ADULT - SUBJECTIVE AND OBJECTIVE BOX
Date of service: 05-07-24 @ 12:10    pt seen and examined  feels better  has some shoulder discomfort   s/p R shoulder debridement/head-liner exchange 5/5     ROS: no fever or chills; denies dizziness, no HA, no SOB or cough, no abdominal pain, no diarrhea or constipation;  no legs pain, no rashes      MEDICATIONS  (STANDING):  acetaminophen     Tablet .. 975 milliGRAM(s) Oral every 8 hours  amLODIPine   Tablet 2.5 milliGRAM(s) Oral daily  ascorbic acid 500 milliGRAM(s) Oral two times a day  aspirin enteric coated 325 milliGRAM(s) Oral daily  cefTRIAXone Injectable. 2000 milliGRAM(s) IV Push every 24 hours  folic acid 1 milliGRAM(s) Oral daily  influenza  Vaccine (HIGH DOSE) 0.7 milliLiter(s) IntraMuscular once  multivitamin 1 Tablet(s) Oral daily  pantoprazole    Tablet 40 milliGRAM(s) Oral before breakfast  risperiDONE   Tablet 0.25 milliGRAM(s) Oral two times a day  vancomycin  IVPB 1000 milliGRAM(s) IV Intermittent every 12 hours      Vital Signs Last 24 Hrs  T(C): 36.5 (07 May 2024 07:55), Max: 36.9 (06 May 2024 21:33)  T(F): 97.7 (07 May 2024 07:55), Max: 98.4 (06 May 2024 21:33)  HR: 72 (07 May 2024 07:55) (72 - 87)  BP: 130/79 (07 May 2024 07:55) (123/73 - 130/79)  BP(mean): --  RR: 19 (07 May 2024 07:55) (16 - 19)  SpO2: 97% (07 May 2024 07:55) (97% - 99%)    Parameters below as of 07 May 2024 07:55  Patient On (Oxygen Delivery Method): room air        PE:  Constitutional: NAD  HEENT: NC/AT, EOMI, PERRLA, conjunctivae clear; ears and nose atraumatic; pharynx benign  Neck: supple; thyroid not palpable  Back: no tenderness  Respiratory: respiratory effort normal; clear to auscultation  Cardiovascular: S1S2 regular, no murmurs  Abdomen: soft, not tender, not distended, positive BS; liver and spleen WNL  Genitourinary: no suprapubic tenderness  Lymphatic: no LN palpable  Musculoskeletal: no muscle tenderness, no joint swelling or tenderness  Extremities: no pedal edema  Neurological/ Psychiatric: AxOx3, Judgement and insight normal;  moving all extremities  Skin: no rashes; no palpable lesions R shoulder tenderness     Labs: all available labs reviewed                                   11.8   8.09  )-----------( 324      ( 07 May 2024 08:26 )             37.3     05-07    142  |  110<H>  |  21  ----------------------------<  92  4.1   |  27  |  1.09    Ca    9.0      07 May 2024 08:26         Vancomycin Level, Trough: 15.8 ug/mL (05-07 @ 08:26)      Urinalysis Basic - ( 05 May 2024 14:48 )    Color: x / Appearance: x / SG: x / pH: x  Gluc: 133 mg/dL / Ketone: x  / Bili: x / Urobili: x   Blood: x / Protein: x / Nitrite: x   Leuk Esterase: x / RBC: x / WBC x   Sq Epi: x / Non Sq Epi: x / Bacteria: x    Culture - Tissue with Gram Stain (05.05.24 @ 11:59)   Gram Stain:   No polymorphonuclear cells seen per low power field   No organisms seen per oil power field    Specimen Source: .Tissue None  Culture - Tissue with Gram Stain (05.05.24 @ 11:57)   Gram Stain:   Rare polymorphonuclear leukocytes per low power field   No organisms seen per oil power field  Specimen Source: .Tissue RIGHT SHOULDER DEEP TISSUE FOR CULTURE #7  Culture - Blood (05.03.24 @ 05:50)   Specimen Source: .Blood Blood-Peripheral  Culture Results:   No growth at 48 Hours      Radiology: all available radiological tests reviewed  < from: MR Shoulder Joint w/wo IV Cont, Right (05.04.24 @ 13:44) >    IMPRESSION:  Status post reverse total shoulder arthroplasty with adjacent   susceptibility artifact. There is a large glenohumeral joint effusion   which appears to connect to a soft tissue defect within the anterior soft   tissues with marked postcontrast enhancement. This concerning for septic   joint infection.    --- End of Report ---    < end of copied text >    Advanced directives addressed: full resuscitation
Patient interviewed and examined at bedside. Patient reports pain well controlled on medications. No acute events overnight. Pt denies fevers, chills, new onset numbness, weakness or tingling in the extremities.    VITALS:  T(C): 36.8 (05 May 2024 08:57), Max: 36.8 (04 May 2024 15:50)  T(F): 98.2 (05 May 2024 08:57), Max: 98.2 (04 May 2024 15:50)  HR: 79 (05 May 2024 08:57) (76 - 84)  BP: 136/86 (05 May 2024 08:57) (126/55 - 148/86)  RR: 18 (05 May 2024 08:57) (18 - 18)  SpO2: 99% (05 May 2024 08:57) (96% - 99%)  O2 Parameters below as of 05 May 2024 08:57  Patient On (Oxygen Delivery Method): room air          LABS:  cret                        12.3   6.53  )-----------( 300      ( 05 May 2024 04:41 )             38.3     05-05    142  |  111<H>  |  19  ----------------------------<  102<H>  4.1   |  27  |  0.90    Ca    9.1      05 May 2024 04:41      PT/INR - ( 05 May 2024 04:41 )   PT: 10.6 sec;   INR: 0.94 ratio         PTT - ( 05 May 2024 04:41 )  PTT:37.4 sec                  Gen: NAD, Resting comfortably    RIGHT Upper Extremity:   Well healed anterior shoulder scar  1 cm in diameter wound present at the proximal aspect of the shoulder scar from the aspiration  NTTP over the bony prominences of the shoulder/elbow/wrist/hand/fingers  Painless passive/active ROM of the shoulder/elbow/wrist/hand/fingers  C5-T1 SILT  Motor grossly intact throughout axillary/musculocutaneous/radial/median/ulnar/AIN/PIN nerves  + radial pulse  Full active/passive shoulder ROM with no pain  No pain with micromotion of shoulder  Compartments soft and compressible      A/P: 88M who presents with R shoulder pain s/p R reverse TSA with Dr. Macias in 9/2023    Plan for OR today (5/5/24) for Irrigation and Debridement of Right Total Shoulder with Polyethylene Liner Exchange.   Documentation of Medical optimization appreciated.   MR R Shoulder w/ Septic Joint   FU IR Cx  Analgesia  WBAT RUE  DVT ppx: hold pending results of MR R shoulder w/w/o and IR aspiration  PT/OT  Will discuss plan with Dr. Macias and will advise changes to plan as needed    
Patient seen and examined at bedside. Patient reports pain well controlled on medications. No acute events in PACU. Pt denies fevers, chills, new onset numbness, weakness or tingling in the extremities.    T(C): 36.8 (05-05-24 @ 15:55), Max: 36.8 (05-05-24 @ 08:57)  T(F): 98.2 (05-05-24 @ 15:55), Max: 98.2 (05-05-24 @ 08:57)  HR: 79 (05-05-24 @ 15:55) (75 - 92)  BP: 129/79 (05-05-24 @ 15:55) (115/69 - 136/86)  RR: 16 (05-05-24 @ 15:55) (14 - 19)  SpO2: 100% (05-05-24 @ 15:55) (96% - 100%)    RUE:  Skin: No erythema, edema or gross lesions noted. Dressings 3/4 saturated, borders intact.  Shamika-incisional TTP, otherwise NTTP  SILT C5-T1  Motor: +AIN/PIN/U/Ax  Compartments soft and compressible  2+ RP    A/p:  Pt is s/p head/liner exchange for a R rTSA PJI 5/5/2024  NWB RUE/PT/OT   NWB ROM exercise encouraged per PT (rTSA protocol)  Incision closed w Nylons  Pain regimen PRN  DVT ppx: Aspirin 325 QD  Post op abx ppx  Abx per ID  F/u OR Cx  Pt will require PICC line pending ID/OR cx  Dispo per PT  Plan discussed w patient, who is in agreement with the above  Plan discussed w attending, who is in agreement with the above      
Patient seen and examined at bedside. Patient reports pain well controlled on medications. No acute events overnight. Pt denies fevers, chills, new onset numbness, weakness or tingling in the extremities.    Vital Signs (24 Hrs):  T(C): 36.3 (05-04-24 @ 07:15), Max: 37.2 (05-03-24 @ 14:41)  HR: 78 (05-04-24 @ 07:15) (51 - 98)  BP: 135/68 (05-04-24 @ 07:15) (131/88 - 171/94)  RR: 17 (05-04-24 @ 07:15) (16 - 18)  SpO2: 99% (05-04-24 @ 07:15) (97% - 100%)  Wt(kg): --    LABS:                          12.4   6.91  )-----------( 279      ( 04 May 2024 05:14 )             38.2     05-04    141  |  109<H>  |  15  ----------------------------<  101<H>  3.7   |  26  |  0.91    Ca    9.0      04 May 2024 05:14  Mg     1.8     05-02    TPro  6.8  /  Alb  3.6  /  TBili  0.4  /  DBili  x   /  AST  14  /  ALT  13  /  AlkPhos  133<H>  05-02    LIVER FUNCTIONS - ( 02 May 2024 21:00 )  Alb: 3.6 g/dL / Pro: 6.8 g/dL / ALK PHOS: 133 U/L / ALT: 13 U/L / AST: 14 U/L / GGT: x           PT/INR - ( 04 May 2024 05:14 )   PT: 10.8 sec;   INR: 0.95 ratio         PTT - ( 04 May 2024 05:14 )  PTT:35.3 sec    Gen: NAD, Resting comfortably    RIGHT Upper Extremity:   Well healed anterior shoulder scar  1 cm in diameter wound present at the proximal aspect of the shoulder scar from the aspiration  NTTP over the bony prominences of the shoulder/elbow/wrist/hand/fingers  Painless passive/active ROM of the shoulder/elbow/wrist/hand/fingers  C5-T1 SILT  Motor grossly intact throughout axillary/musculocutaneous/radial/median/ulnar/AIN/PIN nerves  + radial pulse  Full active/passive shoulder ROM with no pain  No pain with micromotion of shoulder  Compartments soft and compressible      A/P: 88M who presents with R shoulder pain s/p R reverse TSA with Dr. Macias in 9/2023    FU MR R Shoulder MR w/w/o, patient unable to tolerate it yesterday, will try again today  FU IR aspiration culture of R shoulder  R Shldr Asp GS: Neg  Analgesia  WBAT RUE  DVT ppx: hold pending results of MR R shoulder w/w/o and IR aspiration  PT/OT  Will discuss plan with Dr. Macias and will advise changes to plan as needed    
Patient seen and examined at bedside. Patient reports pain well controlled on medications. No acute events overnight. Pt denies fevers, chills, new onset numbness, weakness or tingling in the extremities.  T(C): 36.9 (05-05-24 @ 20:49), Max: 36.9 (05-05-24 @ 20:49)  T(F): 98.4 (05-05-24 @ 20:49), Max: 98.4 (05-05-24 @ 20:49)  HR: 97 (05-05-24 @ 20:49) (75 - 97)  BP: 103/76 (05-05-24 @ 20:49) (103/76 - 136/86)  RR: 18 (05-05-24 @ 20:49) (14 - 19)  SpO2: 97% (05-05-24 @ 20:49) (97% - 100%)    RUE:  Skin: No erythema, edema or gross lesions noted. Dressings 3/4 saturated, borders intact.  Shamika-incisional TTP, otherwise NTTP  SILT C5-T1  Motor: +AIN/PIN/U/Ax  Compartments soft and compressible  2+ RP    A/p:  Pt is s/p head/liner exchange for a R rTSA PJI 5/5/2024  NWB RUE/PT/OT   NWB ROM exercise encouraged per PT (rTSA protocol)  Incision closed w Nylons  Pain regimen PRN  DVT ppx: Aspirin 325 QD  Post op abx ppx  Abx per ID  F/u OR Cx  Pt will require PICC line pending ID/OR cx  Dispo per PT  Plan discussed w patient, who is in agreement with the above  Plan discussed w attending, who is in agreement with the above
Patient seen and examined at bedside. Patient reports pain well controlled on medications. No acute events overnight. Pt denies fevers, chills, new onset numbness, weakness or tingling in the extremities. Now growing SA, on adequate coverage.    Vital Signs Last 24 Hrs  T(C): 36.9 (06 May 2024 21:33), Max: 36.9 (06 May 2024 21:33)  T(F): 98.4 (06 May 2024 21:33), Max: 98.4 (06 May 2024 21:33)  HR: 78 (06 May 2024 21:33) (78 - 108)  BP: 123/73 (06 May 2024 21:33) (123/73 - 130/73)  BP(mean): --  RR: 16 (06 May 2024 21:33) (16 - 18)  SpO2: 98% (06 May 2024 21:33) (98% - 99%)    Parameters below as of 06 May 2024 21:33  Patient On (Oxygen Delivery Method): room air        RUE:  Skin: No erythema, edema or gross lesions noted. CECILIA saturated but dry  Shamika-incisional TTP, otherwise NTTP  SILT C5-T1  Motor: +AIN/PIN/U/Ax  Compartments soft and compressible  2+ RP    A/p:  Pt is s/p head/liner exchange for a R rTSA PJI 5/5/2024  NWB RUE/PT/OT   NWB ROM exercise encouraged per PT (rTSA protocol)  Incision closed w Nylons  Pain regimen PRN  DVT ppx: Aspirin 325 QD  Post op abx ppx, PICC ordered  Abx per ID  F/u OR Cx  Pt will require PICC line pending ID/OR cx  Dispo per PT  Plan discussed w patient, who is in agreement with the above  Plan discussed w attending, who is in agreement with the above
The patient is a 88y Male with significant PMH of HTN, Gout, GERD, nephrolithiasis, Osteoarthritis and others had been admitted yesterday on 2024 under Ortho service under care of Dr. 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  Patient had reversal of right TSA in 2023 by Dr. Macias. Hospitalist consult has been called today by Ortho resident for Pre-operative risk stratification as plan for right shoulder washout with poly exchange in OR tomorrow. Yesterday, patient had right shoulder arthrocentesis by IR. which was dry tap. MRI done today shows large glenohumeral joint effusion concerning for septic joint infection.  Currently, patient is not on any antibiotics. Patient says that he still has leakage and drainage per right shoulder anterior surgical site. At this time, patient denies ant pain to his right shoulder.  He denies any chest pain, sob, CHÁVEZ, palpitation, headache, N/V, abdominal pain, diarrhea or dysuria.  He denies any prior history of MI or stroke.  He denies smoking, alcohol or substance abuse.  EK bpm with occasional PVCs.      24: Patient seen and examined. He denies any new complaints.       Vital Signs Last 24 Hrs  T(C): 36.6 (06 May 2024 07:30), Max: 36.9 (05 May 2024 20:49)  T(F): 97.9 (06 May 2024 07:30), Max: 98.4 (05 May 2024 20:49)  HR: 108 (06 May 2024 07:30) (75 - 108)  BP: 125/81 (06 May 2024 07:30) (103/76 - 129/79)  BP(mean): --  RR: 18 (06 May 2024 07:30) (14 - 18)  SpO2: 98% (06 May 2024 07:30) (97% - 100%)    Parameters below as of 06 May 2024 07:30  Patient On (Oxygen Delivery Method): room air          PHYSICAL EXAM:    GENERAL: NAD, lying in bed comfortably  HEAD:  Atraumatic, Normocephalic  EYES: EOMI, PERRLA, conjunctiva and sclera clear  ENT: Moist mucous membranes  NECK: Supple, No JVD  CHEST/LUNG: Clear to auscultation bilaterally; No rales, rhonchi, wheezing, or rubs. Unlabored respirations  HEART: Regular rate and rhythm; No murmurs, rubs, or gallops  ABDOMEN: Bowel sounds present; Soft, Nontender, Nondistended. No hepatomegally  EXTREMITIES:  2+ Peripheral Pulses, brisk capillary refill. No clubbing, cyanosis, or edema  NERVOUS SYSTEM:  Alert & Oriented X3, speech clear. No deficits   MSK: Right shoulder surgical site drainage.                                12.1   12.58 )-----------( 390      ( 06 May 2024 07:43 )             37.8     06 May 2024 07:43    139    |  108    |  23     ----------------------------<  134    4.2     |  24     |  1.29     Ca    9.3        06 May 2024 07:43        PT/INR - ( 05 May 2024 04:41 )   PT: 10.6 sec;   INR: 0.94 ratio         PTT - ( 05 May 2024 04:41 )  PTT:37.4 sec  CAPILLARY BLOOD GLUCOSE            Urinalysis Basic - ( 06 May 2024 07:43 )    Color: x / Appearance: x / SG: x / pH: x  Gluc: 134 mg/dL / Ketone: x  / Bili: x / Urobili: x   Blood: x / Protein: x / Nitrite: x   Leuk Esterase: x / RBC: x / WBC x   Sq Epi: x / Non Sq Epi: x / Bacteria: x          MEDICATIONS  (STANDING):  acetaminophen     Tablet .. 975 milliGRAM(s) Oral every 8 hours  amLODIPine   Tablet 2.5 milliGRAM(s) Oral daily  ascorbic acid 500 milliGRAM(s) Oral two times a day  aspirin enteric coated 325 milliGRAM(s) Oral daily  cefTRIAXone Injectable. 2000 milliGRAM(s) IV Push every 24 hours  folic acid 1 milliGRAM(s) Oral daily  influenza  Vaccine (HIGH DOSE) 0.7 milliLiter(s) IntraMuscular once  multivitamin 1 Tablet(s) Oral daily  pantoprazole    Tablet 40 milliGRAM(s) Oral before breakfast  risperiDONE   Tablet 0.25 milliGRAM(s) Oral two times a day  vancomycin  IVPB 1000 milliGRAM(s) IV Intermittent every 12 hours    MEDICATIONS  (PRN):  acetaminophen     Tablet .. 650 milliGRAM(s) Oral every 6 hours PRN Temp greater or equal to 38C (100.4F), Mild Pain (1 - 3)  haloperidol    Injectable 1 milliGRAM(s) IntraMuscular every 8 hours PRN Agitation  magnesium hydroxide Suspension 30 milliLiter(s) Oral daily PRN Constipation  melatonin 3 milliGRAM(s) Oral at bedtime PRN Sleep  ondansetron Injectable 4 milliGRAM(s) IV Push every 6 hours PRN Nausea and/or Vomiting  oxyCODONE    IR 5 milliGRAM(s) Oral every 4 hours PRN Moderate Pain (4 - 6)  oxyCODONE    IR 10 milliGRAM(s) Oral every 4 hours PRN Severe Pain (7 - 10)  polyethylene glycol 3350 17 Gram(s) Oral at bedtime PRN Constipation  QUEtiapine 12.5 milliGRAM(s) Oral every 8 hours PRN for anxiety  senna 2 Tablet(s) Oral at bedtime PRN Constipation  traMADol 50 milliGRAM(s) Oral every 4 hours PRN Mild Pain (1 - 3)          · Assessment    The patient is a 88y Male with significant PMH of HTN, Gout, GERD, nephrolithiasis, Osteoarthritis and others had been admitted yesterday on 2024 under Ortho service under care of Dr. 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  Patient had reversal of right TSA in 2023 by Dr. Macias. Hospitalist consult has been called today by Ortho resident for Pre-operative risk stratification as plan for right shoulder washout with poly exchange in OR tomorrow. Yesterday, patient had right shoulder arthrocentesis by IR. which was dry tap. MRI done today shows large glenohumeral joint effusion concerning for septic joint infection.  Currently, patient is not on any antibiotics. Patient says that he still has leakage and drainage per right shoulder anterior surgical site. At this time, patient denies ant pain to his right shoulder.  He denies any chest pain, sob, CHÁVEZ, palpitation, headache, N/V, abdominal pain, diarrhea or dysuria.  He denies any prior history of MI or stroke.  He denies smoking, alcohol or substance abuse.    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  -S/p dry arthrocentesis on 2024.  S/P debridement on 24  -Blood culture so far negative.  -Pain control.  -Continue IV antibiotics as per ID  -Further management per primary team.    # HTN.  -Stable and controlled.  -On Norvasc.    # GERD.  -On Protonix.    # H/o Gout.  -In remission.  Denies any pain.    # DVT prophylaxis: per Ortho.  
  Date of service: 05-06-24 @ 11:13    pt seen and examined  feels better      ROS: no fever or chills; denies dizziness, no HA, no SOB or cough, no abdominal pain, no diarrhea or constipation;  no legs pain, no rashes    MEDICATIONS  (STANDING):  acetaminophen     Tablet .. 975 milliGRAM(s) Oral every 8 hours  amLODIPine   Tablet 2.5 milliGRAM(s) Oral daily  ascorbic acid 500 milliGRAM(s) Oral two times a day  aspirin enteric coated 325 milliGRAM(s) Oral daily  cefTRIAXone Injectable. 2000 milliGRAM(s) IV Push every 24 hours  folic acid 1 milliGRAM(s) Oral daily  influenza  Vaccine (HIGH DOSE) 0.7 milliLiter(s) IntraMuscular once  multivitamin 1 Tablet(s) Oral daily  pantoprazole    Tablet 40 milliGRAM(s) Oral before breakfast  risperiDONE   Tablet 0.25 milliGRAM(s) Oral two times a day  vancomycin  IVPB 1000 milliGRAM(s) IV Intermittent every 12 hours      Vital Signs Last 24 Hrs  T(C): 36.6 (06 May 2024 07:30), Max: 36.9 (05 May 2024 20:49)  T(F): 97.9 (06 May 2024 07:30), Max: 98.4 (05 May 2024 20:49)  HR: 108 (06 May 2024 07:30) (75 - 108)  BP: 125/81 (06 May 2024 07:30) (103/76 - 132/78)  BP(mean): --  RR: 18 (06 May 2024 07:30) (14 - 19)  SpO2: 98% (06 May 2024 07:30) (97% - 100%)    Parameters below as of 06 May 2024 07:30  Patient On (Oxygen Delivery Method): room air          PE:  Constitutional: NAD  HEENT: NC/AT, EOMI, PERRLA, conjunctivae clear; ears and nose atraumatic; pharynx benign  Neck: supple; thyroid not palpable  Back: no tenderness  Respiratory: respiratory effort normal; clear to auscultation  Cardiovascular: S1S2 regular, no murmurs  Abdomen: soft, not tender, not distended, positive BS; liver and spleen WNL  Genitourinary: no suprapubic tenderness  Lymphatic: no LN palpable  Musculoskeletal: no muscle tenderness, no joint swelling or tenderness  Extremities: no pedal edema  Neurological/ Psychiatric: AxOx3, Judgement and insight normal;  moving all extremities  Skin: no rashes; no palpable lesions R shoulder tenderness     Labs: all available labs reviewed                        12.1   12.58 )-----------( 390      ( 06 May 2024 07:43 )             37.8     05-06    139  |  108  |  23  ----------------------------<  134<H>  4.2   |  24  |  1.29    Ca    9.3      06 May 2024 07:43           Urinalysis Basic - ( 05 May 2024 14:48 )    Color: x / Appearance: x / SG: x / pH: x  Gluc: 133 mg/dL / Ketone: x  / Bili: x / Urobili: x   Blood: x / Protein: x / Nitrite: x   Leuk Esterase: x / RBC: x / WBC x   Sq Epi: x / Non Sq Epi: x / Bacteria: x    Culture - Tissue with Gram Stain (05.05.24 @ 11:59)   Gram Stain:   No polymorphonuclear cells seen per low power field   No organisms seen per oil power field    Specimen Source: .Tissue None  Culture - Tissue with Gram Stain (05.05.24 @ 11:57)   Gram Stain:   Rare polymorphonuclear leukocytes per low power field   No organisms seen per oil power field  Specimen Source: .Tissue RIGHT SHOULDER DEEP TISSUE FOR CULTURE #7  Culture - Blood (05.03.24 @ 05:50)   Specimen Source: .Blood Blood-Peripheral  Culture Results:   No growth at 48 Hours      Radiology: all available radiological tests reviewed  < from: MR Shoulder Joint w/wo IV Cont, Right (05.04.24 @ 13:44) >    IMPRESSION:  Status post reverse total shoulder arthroplasty with adjacent   susceptibility artifact. There is a large glenohumeral joint effusion   which appears to connect to a soft tissue defect within the anterior soft   tissues with marked postcontrast enhancement. This concerning for septic   joint infection.    --- End of Report ---    < end of copied text >    Advanced directives addressed: full resuscitation

## 2024-05-07 NOTE — PROCEDURE NOTE - PROCEDURE FINDINGS AND DETAILS
Successful placement of 4 [place] 53cm single lumen LUE PICC via left brachial vein, line may be accessed.

## 2024-05-08 LAB
-  CLINDAMYCIN: SIGNIFICANT CHANGE UP
-  ERYTHROMYCIN: SIGNIFICANT CHANGE UP
-  GENTAMICIN: SIGNIFICANT CHANGE UP
-  OXACILLIN: SIGNIFICANT CHANGE UP
-  PENICILLIN: SIGNIFICANT CHANGE UP
-  RIFAMPIN: SIGNIFICANT CHANGE UP
-  TETRACYCLINE: SIGNIFICANT CHANGE UP
-  TRIMETHOPRIM/SULFAMETHOXAZOLE: SIGNIFICANT CHANGE UP
-  VANCOMYCIN: SIGNIFICANT CHANGE UP
CULTURE RESULTS: SIGNIFICANT CHANGE UP
CULTURE RESULTS: SIGNIFICANT CHANGE UP
GRAM STN FLD: ABNORMAL
METHOD TYPE: SIGNIFICANT CHANGE UP
SPECIMEN SOURCE: SIGNIFICANT CHANGE UP
SPECIMEN SOURCE: SIGNIFICANT CHANGE UP

## 2024-05-10 LAB
CULTURE RESULTS: ABNORMAL
CULTURE RESULTS: SIGNIFICANT CHANGE UP
ORGANISM # SPEC MICROSCOPIC CNT: ABNORMAL
ORGANISM # SPEC MICROSCOPIC CNT: SIGNIFICANT CHANGE UP
SPECIMEN SOURCE: SIGNIFICANT CHANGE UP

## 2024-05-14 DIAGNOSIS — I10 ESSENTIAL (PRIMARY) HYPERTENSION: ICD-10-CM
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
## 2024-05-14 DIAGNOSIS — Z96.611 PRESENCE OF RIGHT ARTIFICIAL SHOULDER JOINT: ICD-10-CM

## 2024-05-14 DIAGNOSIS — Z87.442 PERSONAL HISTORY OF URINARY CALCULI: ICD-10-CM

## 2024-05-14 DIAGNOSIS — T84.59XA INFECTION AND INFLAMMATORY REACTION DUE TO OTHER INTERNAL JOINT PROSTHESIS, INITIAL ENCOUNTER: ICD-10-CM

## 2024-05-14 DIAGNOSIS — Z96.642 PRESENCE OF LEFT ARTIFICIAL HIP JOINT: ICD-10-CM

## 2024-05-14 DIAGNOSIS — M10.9 GOUT, UNSPECIFIED: ICD-10-CM

## 2024-05-14 DIAGNOSIS — Z85.828 PERSONAL HISTORY OF OTHER MALIGNANT NEOPLASM OF SKIN: ICD-10-CM
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
## 2024-05-14 DIAGNOSIS — M25.411 EFFUSION, RIGHT SHOULDER: ICD-10-CM

## 2024-05-18 LAB
CULTURE RESULTS: SIGNIFICANT CHANGE UP
SPECIMEN SOURCE: SIGNIFICANT CHANGE UP

## 2024-05-31 LAB
-  AMPHOTERICIN B: SIGNIFICANT CHANGE UP
-  ANIDULAFUNGIN: SIGNIFICANT CHANGE UP
-  CASPOFUNGIN: SIGNIFICANT CHANGE UP
-  FLUCONAZOLE: SIGNIFICANT CHANGE UP
-  MICAFUNGIN: SIGNIFICANT CHANGE UP
-  POSACONAZOLE: SIGNIFICANT CHANGE UP
-  VORICONAZOLE: SIGNIFICANT CHANGE UP
CULTURE RESULTS: ABNORMAL
METHOD TYPE: SIGNIFICANT CHANGE UP
ORGANISM # SPEC MICROSCOPIC CNT: ABNORMAL
ORGANISM # SPEC MICROSCOPIC CNT: SIGNIFICANT CHANGE UP
SPECIMEN SOURCE: SIGNIFICANT CHANGE UP

## 2024-06-05 LAB

## 2024-06-10 ENCOUNTER — APPOINTMENT (OUTPATIENT)
Dept: ORTHOPEDIC SURGERY | Facility: CLINIC | Age: 89
End: 2024-06-10
Payer: MEDICARE

## 2024-06-10 DIAGNOSIS — Z96.611 PRESENCE OF RIGHT ARTIFICIAL SHOULDER JOINT: ICD-10-CM

## 2024-06-10 PROCEDURE — 73030 X-RAY EXAM OF SHOULDER: CPT | Mod: 26,RT

## 2024-06-10 PROCEDURE — 99024 POSTOP FOLLOW-UP VISIT: CPT

## 2024-06-10 NOTE — CONSULT LETTER
[Dear  ___] : Dear  [unfilled], [Consult Letter:] : I had the pleasure of evaluating your patient, [unfilled]. [Please see my note below.] : Please see my note below. [Sincerely,] : Sincerely, [FreeTextEntry3] : Dr. Sergei Macias

## 2024-06-10 NOTE — REVIEW OF SYSTEMS
[Negative] : Heme/Lymph [FreeTextEntry9] : SPO Reverse total replacement of right shoulder joint DOS 9/20/23.

## 2024-06-10 NOTE — DISCUSSION/SUMMARY
[de-identified] : I had a discussion with the patient regarding the operative and postoperative course. The patient is doing well. He should f/u with an infectious disease specialist at the end of next week. Patient will follow up in 4-6 wks for repeat clinical assessment. Stitches were removed in the office today. All questions were answered and the patient verbalized understanding. The patient is in agreement with this treatment plan.

## 2024-06-10 NOTE — HISTORY OF PRESENT ILLNESS
[de-identified] : Pt arrives today for a follow-up visit for his right shoulder. SPO Reverse total replacement of right shoulder joint. DOS 9/20/23. He was admitted to the hospital and is s/p I&D head and liner exchange for Right Total shoulder Infection on 6/5/24. he continues to take antibiotics and is on a PICC line. He states that he is doing well and going to PT 3x a week. States there is some residual pain but he expects it.

## 2024-06-10 NOTE — PHYSICAL EXAM
[de-identified] : General: Well appearing, no acute distress Neurologic: A&Ox3, No focal deficits Head: NCAT without abrasions, lacerations, or ecchymosis to head, face, or scalp Eyes: No scleral icterus, no gross abnormalities Respiratory: Equal chest wall expansion bilaterally, no accessory muscle use Lymphatic: No lymphadenopathy palpated Skin: Warm and dry Psychiatric: Normal mood and affect   Right Shoulder:  Inspection/Palpation: no tenderness, swelling or deformities  Range of Motion: no crepitus; FF 0-140; ER at side 40; IR to back pocket  Strength: forward elevation in scapular plane [5]/5, internal rotation [5]/5, external rotation [5]/5, adduction [5]/5 and abduction [5]/5  Stability: load and shift test negative, apprehension test negative, relocation test negative, sulcus sign negative, Symone test negative, Jerk test negative  Tests: Justice negative, Neer's test's test negative, drop arm test negative, bear hug test negative, Preble sign negative, cross arm adduction negative, lift off sign negative, Hornblower's sign negative, speeds test negative, Yergason's test negative, bicipital groove tenderness negative, Costa's Active Compression test negative, whipple test negative, bicep's load II test negative [de-identified] : The following radiographs were ordered and read by me during this patient's visit. I reviewed each radiograph in detail with the patient and discussed the findings as highlighted below. 2 views of the right shoulder were obtained today that show no fracture, dislocation. Hardware is in place and intact. No signs of infection.

## 2024-06-10 NOTE — ADDENDUM
[FreeTextEntry1] : Documented by Candi Stephen acting as a scribe for Dr. Macias on 06/10/2024. All medical record entries made by the Scribe were at my, Dr. Macias's, direction and personally dictated by me on 06/10/2024. I have reviewed the chart and agree that the record accurately reflects my personal performance of the history, physical exam, procedure and imaging.

## 2024-06-19 LAB
CULTURE RESULTS: SIGNIFICANT CHANGE UP
SPECIMEN SOURCE: SIGNIFICANT CHANGE UP

## 2024-07-08 ENCOUNTER — APPOINTMENT (OUTPATIENT)
Dept: ORTHOPEDIC SURGERY | Facility: CLINIC | Age: 89
End: 2024-07-08
Payer: MEDICARE

## 2024-07-08 VITALS — WEIGHT: 195 LBS | BODY MASS INDEX: 26.41 KG/M2 | HEIGHT: 72 IN

## 2024-07-08 DIAGNOSIS — Z96.611 PRESENCE OF RIGHT ARTIFICIAL SHOULDER JOINT: ICD-10-CM

## 2024-07-08 PROCEDURE — 99024 POSTOP FOLLOW-UP VISIT: CPT

## 2024-11-14 ENCOUNTER — OFFICE (OUTPATIENT)
Dept: URBAN - METROPOLITAN AREA CLINIC 113 | Facility: CLINIC | Age: 89
Setting detail: OPHTHALMOLOGY
End: 2024-11-14
Payer: MEDICARE

## 2024-11-14 DIAGNOSIS — Z96.1: ICD-10-CM

## 2024-11-14 DIAGNOSIS — H40.31X4: ICD-10-CM

## 2024-11-14 PROCEDURE — 92250 FUNDUS PHOTOGRAPHY W/I&R: CPT | Performed by: OPHTHALMOLOGY

## 2024-11-14 PROCEDURE — 92012 INTRM OPH EXAM EST PATIENT: CPT | Performed by: OPHTHALMOLOGY

## 2024-11-14 ASSESSMENT — PACHYMETRY
OD_CT_CORRECTION: 1
OS_CT_CORRECTION: 1
OD_CT_UM: 520
OS_CT_UM: 521

## 2024-11-14 ASSESSMENT — REFRACTION_AUTOREFRACTION
OS_CYLINDER: -0.75
OD_AXIS: 116
OS_AXIS: 084
OD_CYLINDER: -1.25
OS_SPHERE: +0.50
OD_SPHERE: +0.75

## 2024-11-14 ASSESSMENT — TONOMETRY
OS_IOP_MMHG: 16
OD_IOP_MMHG: 16

## 2024-11-14 ASSESSMENT — KERATOMETRY
OD_K1POWER_DIOPTERS: 41.00
OS_K1POWER_DIOPTERS: 42.00
OS_AXISANGLE_DEGREES: 090
OS_K2POWER_DIOPTERS: 42.00
OD_AXISANGLE_DEGREES: 034
OD_K2POWER_DIOPTERS: 41.25

## 2024-11-14 ASSESSMENT — CONFRONTATIONAL VISUAL FIELD TEST (CVF)
OS_FINDINGS: FULL
OD_FINDINGS: FULL

## 2024-11-14 ASSESSMENT — VISUAL ACUITY
OD_BCVA: 20/100-1
OS_BCVA: 20/30-1

## 2025-05-01 NOTE — PATIENT PROFILE ADULT - FALL HARM RISK - FACTORS NURSING JUDGEMENT
Biometrics completed.    Results reviewed with a Registered Nurse; understanding of results and   educational materials was verbalized.   Yes

## 2025-06-24 ENCOUNTER — OFFICE (OUTPATIENT)
Dept: URBAN - METROPOLITAN AREA CLINIC 113 | Facility: CLINIC | Age: 89
Setting detail: OPHTHALMOLOGY
End: 2025-06-24
Payer: MEDICARE

## 2025-06-24 ENCOUNTER — RX ONLY (RX ONLY)
Age: 89
End: 2025-06-24

## 2025-06-24 DIAGNOSIS — H16.221: ICD-10-CM

## 2025-06-24 DIAGNOSIS — H40.31X4: ICD-10-CM

## 2025-06-24 DIAGNOSIS — H35.3123: ICD-10-CM

## 2025-06-24 DIAGNOSIS — H35.3112: ICD-10-CM

## 2025-06-24 DIAGNOSIS — H26.491: ICD-10-CM

## 2025-06-24 PROCEDURE — 99213 OFFICE O/P EST LOW 20 MIN: CPT | Performed by: STUDENT IN AN ORGANIZED HEALTH CARE EDUCATION/TRAINING PROGRAM

## 2025-06-24 ASSESSMENT — PACHYMETRY
OD_CT_UM: 520
OS_CT_UM: 521
OS_CT_CORRECTION: 1
OD_CT_CORRECTION: 1

## 2025-06-24 ASSESSMENT — KERATOMETRY
OD_K1POWER_DIOPTERS: 40.75
OD_K2POWER_DIOPTERS: 41.25
OD_AXISANGLE_DEGREES: 032
OS_K2POWER_DIOPTERS: 41.75
OS_AXISANGLE_DEGREES: 090
OS_K1POWER_DIOPTERS: 41.75

## 2025-06-24 ASSESSMENT — REFRACTION_CURRENTRX
OS_VPRISM_DIRECTION: SV
OS_OVR_VA: 20/
OD_SPHERE: +2.00
OD_VPRISM_DIRECTION: SV
OD_CYLINDER: 0.00
OS_AXIS: 180
OS_SPHERE: +2.00
OD_OVR_VA: 20/
OD_AXIS: 180
OS_CYLINDER: 0.00

## 2025-06-24 ASSESSMENT — TONOMETRY
OS_IOP_MMHG: 20
OS_IOP_MMHG: 13
OD_IOP_MMHG: 20
OD_IOP_MMHG: 14

## 2025-06-24 ASSESSMENT — CONFRONTATIONAL VISUAL FIELD TEST (CVF)
OS_FINDINGS: FULL
OD_FINDINGS: FULL

## 2025-06-24 ASSESSMENT — REFRACTION_AUTOREFRACTION
OD_AXIS: 104
OD_SPHERE: +0.75
OS_SPHERE: +0.25
OS_CYLINDER: -0.50
OD_CYLINDER: -1.25
OS_AXIS: 077

## 2025-06-24 ASSESSMENT — VISUAL ACUITY
OD_BCVA: 20/150
OS_BCVA: 20/30

## 2025-06-24 ASSESSMENT — SUPERFICIAL PUNCTATE KERATITIS (SPK): OD_SPK: 1+ 2+

## 2025-07-29 ENCOUNTER — OFFICE (OUTPATIENT)
Dept: URBAN - METROPOLITAN AREA CLINIC 88 | Facility: CLINIC | Age: 89
Setting detail: OPHTHALMOLOGY
End: 2025-07-29
Payer: MEDICARE

## 2025-07-29 DIAGNOSIS — H35.3134: ICD-10-CM

## 2025-07-29 PROCEDURE — 92014 COMPRE OPH EXAM EST PT 1/>: CPT | Performed by: OPHTHALMOLOGY

## 2025-07-29 PROCEDURE — 92134 CPTRZ OPH DX IMG PST SGM RTA: CPT | Performed by: OPHTHALMOLOGY

## 2025-07-29 ASSESSMENT — TONOMETRY
OD_IOP_MMHG: 14
OS_IOP_MMHG: 15

## 2025-07-29 ASSESSMENT — PACHYMETRY
OS_CT_UM: 521
OD_CT_UM: 520
OS_CT_CORRECTION: 1
OD_CT_CORRECTION: 1

## 2025-07-29 ASSESSMENT — KERATOMETRY
OS_K2POWER_DIOPTERS: 41.75
OD_K2POWER_DIOPTERS: 41.25
OS_AXISANGLE_DEGREES: 090
OD_AXISANGLE_DEGREES: 032
OD_K1POWER_DIOPTERS: 40.75
OS_K1POWER_DIOPTERS: 41.75

## 2025-07-29 ASSESSMENT — CONFRONTATIONAL VISUAL FIELD TEST (CVF)
OD_FINDINGS: FULL
OS_FINDINGS: FULL

## 2025-07-29 ASSESSMENT — VISUAL ACUITY
OS_BCVA: 20/40+1
OD_BCVA: FC 2 FT

## 2025-07-29 ASSESSMENT — REFRACTION_CURRENTRX
OD_OVR_VA: 20/
OS_SPHERE: +2.00
OS_OVR_VA: 20/
OD_CYLINDER: 0.00
OD_VPRISM_DIRECTION: SV
OS_VPRISM_DIRECTION: SV
OS_CYLINDER: 0.00
OD_SPHERE: +2.00
OD_AXIS: 180
OS_AXIS: 180

## 2025-07-29 ASSESSMENT — REFRACTION_AUTOREFRACTION
OD_SPHERE: +0.75
OS_AXIS: 077
OS_CYLINDER: -0.50
OS_SPHERE: +0.25
OD_CYLINDER: -1.25
OD_AXIS: 104

## 2025-07-29 ASSESSMENT — SUPERFICIAL PUNCTATE KERATITIS (SPK): OD_SPK: 1+ 2+
